# Patient Record
Sex: FEMALE | Race: WHITE | NOT HISPANIC OR LATINO | URBAN - METROPOLITAN AREA
[De-identification: names, ages, dates, MRNs, and addresses within clinical notes are randomized per-mention and may not be internally consistent; named-entity substitution may affect disease eponyms.]

---

## 2022-11-14 ENCOUNTER — INPATIENT (INPATIENT)
Facility: HOSPITAL | Age: 62
LOS: 3 days | Discharge: HOME | DRG: 640 | End: 2022-11-18
Attending: STUDENT IN AN ORGANIZED HEALTH CARE EDUCATION/TRAINING PROGRAM | Admitting: INTERNAL MEDICINE
Payer: COMMERCIAL

## 2022-11-14 VITALS
RESPIRATION RATE: 18 BRPM | DIASTOLIC BLOOD PRESSURE: 102 MMHG | HEIGHT: 64 IN | SYSTOLIC BLOOD PRESSURE: 197 MMHG | OXYGEN SATURATION: 98 % | TEMPERATURE: 98 F | WEIGHT: 220.02 LBS | HEART RATE: 102 BPM

## 2022-11-14 LAB
ALBUMIN SERPL ELPH-MCNC: 3.5 G/DL — SIGNIFICANT CHANGE UP (ref 3.3–5)
ALBUMIN SERPL ELPH-MCNC: 3.8 G/DL — SIGNIFICANT CHANGE UP (ref 3.3–5)
ALP SERPL-CCNC: 63 U/L — SIGNIFICANT CHANGE UP (ref 40–120)
ALP SERPL-CCNC: SIGNIFICANT CHANGE UP (ref 40–120)
ALT FLD-CCNC: 9 U/L — LOW (ref 10–45)
ALT FLD-CCNC: SIGNIFICANT CHANGE UP (ref 10–45)
ANION GAP SERPL CALC-SCNC: 20 MMOL/L — HIGH (ref 5–17)
ANION GAP SERPL CALC-SCNC: 22 MMOL/L — HIGH (ref 5–17)
APPEARANCE UR: CLEAR — SIGNIFICANT CHANGE UP
APTT BLD: 31 SEC — SIGNIFICANT CHANGE UP (ref 27.5–35.5)
AST SERPL-CCNC: 14 U/L — SIGNIFICANT CHANGE UP (ref 10–40)
AST SERPL-CCNC: SIGNIFICANT CHANGE UP (ref 10–40)
BACTERIA # UR AUTO: SIGNIFICANT CHANGE UP /HPF
BASE EXCESS BLDV CALC-SCNC: 2 MMOL/L — SIGNIFICANT CHANGE UP (ref -2–3)
BASOPHILS # BLD AUTO: 0.05 K/UL — SIGNIFICANT CHANGE UP (ref 0–0.2)
BASOPHILS NFR BLD AUTO: 0.3 % — SIGNIFICANT CHANGE UP (ref 0–2)
BILIRUB SERPL-MCNC: 0.9 MG/DL — SIGNIFICANT CHANGE UP (ref 0.2–1.2)
BILIRUB SERPL-MCNC: 1 MG/DL — SIGNIFICANT CHANGE UP (ref 0.2–1.2)
BILIRUB UR-MCNC: NEGATIVE — SIGNIFICANT CHANGE UP
BUN SERPL-MCNC: 6 MG/DL — LOW (ref 7–23)
BUN SERPL-MCNC: 6 MG/DL — LOW (ref 7–23)
CALCIUM SERPL-MCNC: 9.1 MG/DL — SIGNIFICANT CHANGE UP (ref 8.4–10.5)
CALCIUM SERPL-MCNC: 9.4 MG/DL — SIGNIFICANT CHANGE UP (ref 8.4–10.5)
CHLORIDE SERPL-SCNC: 73 MMOL/L — LOW (ref 96–108)
CHLORIDE SERPL-SCNC: 75 MMOL/L — LOW (ref 96–108)
CO2 BLDV-SCNC: 26.7 MMOL/L — HIGH (ref 22–26)
CO2 SERPL-SCNC: 19 MMOL/L — LOW (ref 22–31)
CO2 SERPL-SCNC: 21 MMOL/L — LOW (ref 22–31)
COLOR SPEC: YELLOW — SIGNIFICANT CHANGE UP
CREAT ?TM UR-MCNC: 27 MG/DL — SIGNIFICANT CHANGE UP
CREAT SERPL-MCNC: 0.61 MG/DL — SIGNIFICANT CHANGE UP (ref 0.5–1.3)
CREAT SERPL-MCNC: 0.62 MG/DL — SIGNIFICANT CHANGE UP (ref 0.5–1.3)
DIFF PNL FLD: ABNORMAL
EGFR: 100 ML/MIN/1.73M2 — SIGNIFICANT CHANGE UP
EGFR: 100 ML/MIN/1.73M2 — SIGNIFICANT CHANGE UP
EOSINOPHIL # BLD AUTO: 0 K/UL — SIGNIFICANT CHANGE UP (ref 0–0.5)
EOSINOPHIL NFR BLD AUTO: 0 % — SIGNIFICANT CHANGE UP (ref 0–6)
EPI CELLS # UR: SIGNIFICANT CHANGE UP /HPF (ref 0–5)
GAS PNL BLDV: SIGNIFICANT CHANGE UP
GLUCOSE SERPL-MCNC: 196 MG/DL — HIGH (ref 70–99)
GLUCOSE SERPL-MCNC: 198 MG/DL — HIGH (ref 70–99)
GLUCOSE UR QL: NEGATIVE — SIGNIFICANT CHANGE UP
HCO3 BLDV-SCNC: 26 MMOL/L — SIGNIFICANT CHANGE UP (ref 22–29)
HCT VFR BLD CALC: 45.9 % — HIGH (ref 34.5–45)
HGB BLD-MCNC: 16 G/DL — HIGH (ref 11.5–15.5)
IMM GRANULOCYTES NFR BLD AUTO: 0.3 % — SIGNIFICANT CHANGE UP (ref 0–0.9)
INR BLD: 0.98 — SIGNIFICANT CHANGE UP (ref 0.88–1.16)
KETONES UR-MCNC: >=80 MG/DL
LEUKOCYTE ESTERASE UR-ACNC: NEGATIVE — SIGNIFICANT CHANGE UP
LYMPHOCYTES # BLD AUTO: 1.8 K/UL — SIGNIFICANT CHANGE UP (ref 1–3.3)
LYMPHOCYTES # BLD AUTO: 12.2 % — LOW (ref 13–44)
MAGNESIUM SERPL-MCNC: 1.4 MG/DL — LOW (ref 1.6–2.6)
MCHC RBC-ENTMCNC: 28.1 PG — SIGNIFICANT CHANGE UP (ref 27–34)
MCHC RBC-ENTMCNC: 34.9 GM/DL — SIGNIFICANT CHANGE UP (ref 32–36)
MCV RBC AUTO: 80.7 FL — SIGNIFICANT CHANGE UP (ref 80–100)
MONOCYTES # BLD AUTO: 0.72 K/UL — SIGNIFICANT CHANGE UP (ref 0–0.9)
MONOCYTES NFR BLD AUTO: 4.9 % — SIGNIFICANT CHANGE UP (ref 2–14)
NEUTROPHILS # BLD AUTO: 12.11 K/UL — HIGH (ref 1.8–7.4)
NEUTROPHILS NFR BLD AUTO: 82.3 % — HIGH (ref 43–77)
NITRITE UR-MCNC: NEGATIVE — SIGNIFICANT CHANGE UP
NRBC # BLD: 0 /100 WBCS — SIGNIFICANT CHANGE UP (ref 0–0)
OSMOLALITY UR: 135 MOSM/KG — LOW (ref 300–900)
PCO2 BLDV: 36 MMHG — LOW (ref 39–42)
PH BLDV: 7.46 — HIGH (ref 7.32–7.43)
PH UR: 6 — SIGNIFICANT CHANGE UP (ref 5–8)
PLATELET # BLD AUTO: 200 K/UL — SIGNIFICANT CHANGE UP (ref 150–400)
PO2 BLDV: 47 MMHG — HIGH (ref 25–45)
POTASSIUM SERPL-MCNC: 2.7 MMOL/L — CRITICAL LOW (ref 3.5–5.3)
POTASSIUM SERPL-MCNC: SIGNIFICANT CHANGE UP (ref 3.5–5.3)
POTASSIUM SERPL-SCNC: 2.7 MMOL/L — CRITICAL LOW (ref 3.5–5.3)
POTASSIUM SERPL-SCNC: SIGNIFICANT CHANGE UP (ref 3.5–5.3)
PROT SERPL-MCNC: 7.6 G/DL — SIGNIFICANT CHANGE UP (ref 6–8.3)
PROT SERPL-MCNC: 7.8 G/DL — SIGNIFICANT CHANGE UP (ref 6–8.3)
PROT UR-MCNC: 30 MG/DL
PROTHROM AB SERPL-ACNC: 11.7 SEC — SIGNIFICANT CHANGE UP (ref 10.5–13.4)
RBC # BLD: 5.69 M/UL — HIGH (ref 3.8–5.2)
RBC # FLD: 13 % — SIGNIFICANT CHANGE UP (ref 10.3–14.5)
RBC CASTS # UR COMP ASSIST: < 5 /HPF — SIGNIFICANT CHANGE UP
SAO2 % BLDV: 80.8 % — SIGNIFICANT CHANGE UP (ref 67–88)
SARS-COV-2 RNA SPEC QL NAA+PROBE: POSITIVE
SODIUM SERPL-SCNC: 114 MMOL/L — CRITICAL LOW (ref 135–145)
SODIUM SERPL-SCNC: 116 MMOL/L — CRITICAL LOW (ref 135–145)
SODIUM UR-SCNC: 20 MMOL/L — SIGNIFICANT CHANGE UP
SP GR SPEC: 1.01 — SIGNIFICANT CHANGE UP (ref 1–1.03)
TROPONIN T SERPL-MCNC: 0.01 NG/ML — SIGNIFICANT CHANGE UP (ref 0–0.01)
UROBILINOGEN FLD QL: 0.2 E.U./DL — SIGNIFICANT CHANGE UP
WBC # BLD: 14.73 K/UL — HIGH (ref 3.8–10.5)
WBC # FLD AUTO: 14.73 K/UL — HIGH (ref 3.8–10.5)
WBC UR QL: < 5 /HPF — SIGNIFICANT CHANGE UP

## 2022-11-14 PROCEDURE — 71046 X-RAY EXAM CHEST 2 VIEWS: CPT | Mod: 26

## 2022-11-14 PROCEDURE — 99223 1ST HOSP IP/OBS HIGH 75: CPT

## 2022-11-14 PROCEDURE — 99285 EMERGENCY DEPT VISIT HI MDM: CPT | Mod: 25

## 2022-11-14 PROCEDURE — 93010 ELECTROCARDIOGRAM REPORT: CPT

## 2022-11-14 RX ORDER — POTASSIUM CHLORIDE 20 MEQ
40 PACKET (EA) ORAL ONCE
Refills: 0 | Status: COMPLETED | OUTPATIENT
Start: 2022-11-14 | End: 2022-11-14

## 2022-11-14 RX ORDER — POTASSIUM CHLORIDE 20 MEQ
20 PACKET (EA) ORAL
Refills: 0 | Status: COMPLETED | OUTPATIENT
Start: 2022-11-14 | End: 2022-11-15

## 2022-11-14 RX ORDER — MAGNESIUM SULFATE 500 MG/ML
2 VIAL (ML) INJECTION ONCE
Refills: 0 | Status: COMPLETED | OUTPATIENT
Start: 2022-11-14 | End: 2022-11-14

## 2022-11-14 RX ORDER — DESMOPRESSIN ACETATE 0.1 MG/1
0.5 TABLET ORAL ONCE
Refills: 0 | Status: DISCONTINUED | OUTPATIENT
Start: 2022-11-14 | End: 2022-11-14

## 2022-11-14 RX ORDER — POTASSIUM CHLORIDE 20 MEQ
20 PACKET (EA) ORAL ONCE
Refills: 0 | Status: COMPLETED | OUTPATIENT
Start: 2022-11-14 | End: 2022-11-14

## 2022-11-14 RX ORDER — SODIUM CHLORIDE 9 MG/ML
1000 INJECTION INTRAMUSCULAR; INTRAVENOUS; SUBCUTANEOUS ONCE
Refills: 0 | Status: COMPLETED | OUTPATIENT
Start: 2022-11-14 | End: 2022-11-14

## 2022-11-14 RX ORDER — MAGNESIUM SULFATE 500 MG/ML
1 VIAL (ML) INJECTION ONCE
Refills: 0 | Status: COMPLETED | OUTPATIENT
Start: 2022-11-14 | End: 2022-11-14

## 2022-11-14 RX ADMIN — Medication 25 GRAM(S): at 23:37

## 2022-11-14 RX ADMIN — Medication 40 MILLIEQUIVALENT(S): at 23:38

## 2022-11-14 RX ADMIN — Medication 40 MILLIEQUIVALENT(S): at 23:37

## 2022-11-14 RX ADMIN — Medication 100 GRAM(S): at 22:19

## 2022-11-14 RX ADMIN — Medication 50 MILLIEQUIVALENT(S): at 22:19

## 2022-11-14 RX ADMIN — SODIUM CHLORIDE 1000 MILLILITER(S): 9 INJECTION INTRAMUSCULAR; INTRAVENOUS; SUBCUTANEOUS at 19:16
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
## 2022-11-14 NOTE — ED PROVIDER NOTE - PHYSICAL EXAMINATION
CONST: nontoxic NAD speaking in full sentences listening to radio w/ headphones  HEAD: atraumatic  EYES: conjunctivae clear, PERRL, EOMI  ENT: mmm  NECK: supple/FROM  CARD: rrr no murmurs  CHEST: ctab no r/r/w  ABD: soft, nd, nttp, no rebound/guarding  EXT: FROM, symmetric distal pulses intact  SKIN: warm, dry, no rash, no pedal edema/ttp/rash, cap refill <2sec  NEURO: a+ox3, CN II-XII intact, 5/5 strength x4, gross sensation intact x4, baseline gait

## 2022-11-14 NOTE — CONSULT NOTE ADULT - ATTENDING COMMENTS
62 year old female with PMH of HTN, DM, breast CA, hypothyroidism - presented with 2-3 days of generalized weakness; similar presentation about 2 weeks ago - went to OSH (not admitted) and was sent home with BP and DM medications, but she says she has not been taking them; in ER patient found with severe electrolytes abnormalities, including hyponatremia; patient admits to poor oral intake for few weeks, except for water (drinks 10 16oz water bottles per day).    Physical exam:   VS: reviewed  General no distress  Neuro: AAO X 3, no focal neurologic deficits  Lungs: no use of accessory muscles, no wheezing, no crackles  CV: rrr, normal S1/S2  GI: abdomen obese  Extr: no clubbing/cyanosis    Labs/imaging: reviewed  Na 114 -> 116 (received only 100ml iv fluids NS)  K 2.7, Mag 1.4  Glucose 196  Cl 75, bicarb 21, AG 20  BUN 6, creat 0.62  LFTs normal   trop 0.01  TSH 1.53  INR 0.98  WBC 14.73, , Hgb 16.0  VB.46/26/36  Lactate 2.0  COVID +  CXR: no infiltrates    Assessment and Plan:  Hyponatremia - likely primary polydipsia (urine osm 135)  Hypokalemia, hypomagnesemia   AG metabolic acidosis - ? starvation acidosis  COVID +, but no CXR abnormalities  Hx of HTN, DM - unclear about recent prescription medications  Hx of breast CA  Hx of hypothyroidism - unclear if patient taking any synthroid at home   -admit to ICU  -fluid restriction (800ml fluid/24h)  -desmopressin 0.5 X 1  -Na level q2-3h  -replace K, Mag and Phos; follow up repeat chemistry  -repeat serum and urine osm in 12h  -send alcohol level  -send beta-hydroxybutirate  -send procalcitonin   -continue home dose of synthroid (if taking any)  -obtain home meds list if possible (?use of any psych meds)  DVT prophylaxis: lovenox

## 2022-11-14 NOTE — ED PROVIDER NOTE - PROGRESS NOTE DETAILS
pt does NOT recall pharmacy to which Rx were sent. reportedly has them at home and is willing to start taking them "since the care is better here." ju consulted. will see pt. 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 s/p 100cc ivf. remaining discontinued.    micu consulted. will see pt. micu reccs for admission to med/tele under dr portillo. micu called back requesting change to micu under  micu called back requesting change to micu under dr pisano. bed board aware. 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 ivf ordered prior to findings. only total 100cc ivf received. remaining discontinued.    micu consulted. will see pt.
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
## 2022-11-14 NOTE — ED PROVIDER NOTE - CLINICAL SUMMARY MEDICAL DECISION MAKING FREE TEXT BOX
avss. nontoxic. NAD. no systemic sx. a+ox3. no acute focal neuro deficits. gcs 15. 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 npo. ivf discontinued. see progress note. cxr w/o acute abnl. no indication for ct head at this time. urine lytes sent. micu consulted. will admit to micu per reccs.

## 2022-11-14 NOTE — ED ADULT NURSE NOTE - OBJECTIVE STATEMENT
Pt arrived to ED AAOX4 , spont unlab breathing on RA, NAD. PT is c/o bilat leg weakness that began yesterday. Pt states she is normally ambulatory with a walker, but yesterday felt it was very difficult to get up to her walker. No recent falls. Pt has not taken any medications in many years because she believes the pharmacist switched her medications with "sedatives".

## 2022-11-14 NOTE — ED ADULT TRIAGE NOTE - CHIEF COMPLAINT QUOTE
Pt has hx of DM, HTN, "it's been a while since I took my meds." Pt reports generalized weakness. Denies chest pain, sob, headache, dizziness, n/v.

## 2022-11-15 LAB
A1C WITH ESTIMATED AVERAGE GLUCOSE RESULT: 11.9 % — HIGH (ref 4–5.6)
ALBUMIN SERPL ELPH-MCNC: 3.3 G/DL — SIGNIFICANT CHANGE UP (ref 3.3–5)
ALBUMIN SERPL ELPH-MCNC: 3.5 G/DL — SIGNIFICANT CHANGE UP (ref 3.3–5)
ALBUMIN SERPL ELPH-MCNC: 3.7 G/DL — SIGNIFICANT CHANGE UP (ref 3.3–5)
ALP SERPL-CCNC: 57 U/L — SIGNIFICANT CHANGE UP (ref 40–120)
ALP SERPL-CCNC: 59 U/L — SIGNIFICANT CHANGE UP (ref 40–120)
ALP SERPL-CCNC: 63 U/L — SIGNIFICANT CHANGE UP (ref 40–120)
ALT FLD-CCNC: 9 U/L — LOW (ref 10–45)
ALT FLD-CCNC: 9 U/L — LOW (ref 10–45)
ALT FLD-CCNC: SIGNIFICANT CHANGE UP (ref 10–45)
AMPHET UR-MCNC: NEGATIVE — SIGNIFICANT CHANGE UP
ANION GAP SERPL CALC-SCNC: 12 MMOL/L — SIGNIFICANT CHANGE UP (ref 5–17)
ANION GAP SERPL CALC-SCNC: 14 MMOL/L — SIGNIFICANT CHANGE UP (ref 5–17)
ANION GAP SERPL CALC-SCNC: 15 MMOL/L — SIGNIFICANT CHANGE UP (ref 5–17)
ANION GAP SERPL CALC-SCNC: 15 MMOL/L — SIGNIFICANT CHANGE UP (ref 5–17)
ANION GAP SERPL CALC-SCNC: 18 MMOL/L — HIGH (ref 5–17)
ANION GAP SERPL CALC-SCNC: 19 MMOL/L — HIGH (ref 5–17)
ANION GAP SERPL CALC-SCNC: 24 MMOL/L — HIGH (ref 5–17)
AST SERPL-CCNC: 12 U/L — SIGNIFICANT CHANGE UP (ref 10–40)
AST SERPL-CCNC: 13 U/L — SIGNIFICANT CHANGE UP (ref 10–40)
AST SERPL-CCNC: SIGNIFICANT CHANGE UP (ref 10–40)
BARBITURATES UR SCN-MCNC: NEGATIVE — SIGNIFICANT CHANGE UP
BASOPHILS # BLD AUTO: 0.05 K/UL — SIGNIFICANT CHANGE UP (ref 0–0.2)
BASOPHILS NFR BLD AUTO: 0.4 % — SIGNIFICANT CHANGE UP (ref 0–2)
BENZODIAZ UR-MCNC: NEGATIVE — SIGNIFICANT CHANGE UP
BILIRUB SERPL-MCNC: 0.9 MG/DL — SIGNIFICANT CHANGE UP (ref 0.2–1.2)
BILIRUB SERPL-MCNC: 1 MG/DL — SIGNIFICANT CHANGE UP (ref 0.2–1.2)
BILIRUB SERPL-MCNC: 1.1 MG/DL — SIGNIFICANT CHANGE UP (ref 0.2–1.2)
BUN SERPL-MCNC: 11 MG/DL — SIGNIFICANT CHANGE UP (ref 7–23)
BUN SERPL-MCNC: 11 MG/DL — SIGNIFICANT CHANGE UP (ref 7–23)
BUN SERPL-MCNC: 6 MG/DL — LOW (ref 7–23)
BUN SERPL-MCNC: 7 MG/DL — SIGNIFICANT CHANGE UP (ref 7–23)
BUN SERPL-MCNC: 7 MG/DL — SIGNIFICANT CHANGE UP (ref 7–23)
CALCIUM SERPL-MCNC: 7.8 MG/DL — LOW (ref 8.4–10.5)
CALCIUM SERPL-MCNC: 8.2 MG/DL — LOW (ref 8.4–10.5)
CALCIUM SERPL-MCNC: 8.4 MG/DL — SIGNIFICANT CHANGE UP (ref 8.4–10.5)
CALCIUM SERPL-MCNC: 8.5 MG/DL — SIGNIFICANT CHANGE UP (ref 8.4–10.5)
CALCIUM SERPL-MCNC: 9 MG/DL — SIGNIFICANT CHANGE UP (ref 8.4–10.5)
CHLORIDE SERPL-SCNC: 74 MMOL/L — LOW (ref 96–108)
CHLORIDE SERPL-SCNC: 77 MMOL/L — LOW (ref 96–108)
CHLORIDE SERPL-SCNC: 79 MMOL/L — LOW (ref 96–108)
CHLORIDE SERPL-SCNC: 81 MMOL/L — LOW (ref 96–108)
CHLORIDE SERPL-SCNC: 82 MMOL/L — LOW (ref 96–108)
CHLORIDE SERPL-SCNC: 83 MMOL/L — LOW (ref 96–108)
CHLORIDE SERPL-SCNC: 83 MMOL/L — LOW (ref 96–108)
CO2 SERPL-SCNC: 16 MMOL/L — LOW (ref 22–31)
CO2 SERPL-SCNC: 20 MMOL/L — LOW (ref 22–31)
CO2 SERPL-SCNC: 21 MMOL/L — LOW (ref 22–31)
CO2 SERPL-SCNC: 21 MMOL/L — LOW (ref 22–31)
CO2 SERPL-SCNC: 22 MMOL/L — SIGNIFICANT CHANGE UP (ref 22–31)
CO2 SERPL-SCNC: 25 MMOL/L — SIGNIFICANT CHANGE UP (ref 22–31)
CO2 SERPL-SCNC: 25 MMOL/L — SIGNIFICANT CHANGE UP (ref 22–31)
COCAINE METAB.OTHER UR-MCNC: NEGATIVE — SIGNIFICANT CHANGE UP
CREAT SERPL-MCNC: 0.48 MG/DL — LOW (ref 0.5–1.3)
CREAT SERPL-MCNC: 0.56 MG/DL — SIGNIFICANT CHANGE UP (ref 0.5–1.3)
CREAT SERPL-MCNC: 0.64 MG/DL — SIGNIFICANT CHANGE UP (ref 0.5–1.3)
CREAT SERPL-MCNC: 0.67 MG/DL — SIGNIFICANT CHANGE UP (ref 0.5–1.3)
CREAT SERPL-MCNC: 0.69 MG/DL — SIGNIFICANT CHANGE UP (ref 0.5–1.3)
CREAT SERPL-MCNC: 0.71 MG/DL — SIGNIFICANT CHANGE UP (ref 0.5–1.3)
CREAT SERPL-MCNC: 0.74 MG/DL — SIGNIFICANT CHANGE UP (ref 0.5–1.3)
EGFR: 102 ML/MIN/1.73M2 — SIGNIFICANT CHANGE UP
EGFR: 106 ML/MIN/1.73M2 — SIGNIFICANT CHANGE UP
EGFR: 91 ML/MIN/1.73M2 — SIGNIFICANT CHANGE UP
EGFR: 95 ML/MIN/1.73M2 — SIGNIFICANT CHANGE UP
EGFR: 97 ML/MIN/1.73M2 — SIGNIFICANT CHANGE UP
EGFR: 98 ML/MIN/1.73M2 — SIGNIFICANT CHANGE UP
EGFR: 99 ML/MIN/1.73M2 — SIGNIFICANT CHANGE UP
EOSINOPHIL # BLD AUTO: 0.01 K/UL — SIGNIFICANT CHANGE UP (ref 0–0.5)
EOSINOPHIL NFR BLD AUTO: 0.1 % — SIGNIFICANT CHANGE UP (ref 0–6)
ESTIMATED AVERAGE GLUCOSE: 295 MG/DL — HIGH (ref 68–114)
ETHANOL SERPL-MCNC: <10 MG/DL — SIGNIFICANT CHANGE UP (ref 0–10)
GAS PNL BLDA: SIGNIFICANT CHANGE UP
GLUCOSE BLDC GLUCOMTR-MCNC: 158 MG/DL — HIGH (ref 70–99)
GLUCOSE BLDC GLUCOMTR-MCNC: 183 MG/DL — HIGH (ref 70–99)
GLUCOSE BLDC GLUCOMTR-MCNC: 211 MG/DL — HIGH (ref 70–99)
GLUCOSE BLDC GLUCOMTR-MCNC: 352 MG/DL — HIGH (ref 70–99)
GLUCOSE SERPL-MCNC: 158 MG/DL — HIGH (ref 70–99)
GLUCOSE SERPL-MCNC: 178 MG/DL — HIGH (ref 70–99)
GLUCOSE SERPL-MCNC: 185 MG/DL — HIGH (ref 70–99)
GLUCOSE SERPL-MCNC: 196 MG/DL — HIGH (ref 70–99)
GLUCOSE SERPL-MCNC: 197 MG/DL — HIGH (ref 70–99)
GLUCOSE SERPL-MCNC: 310 MG/DL — HIGH (ref 70–99)
GLUCOSE SERPL-MCNC: 427 MG/DL — HIGH (ref 70–99)
HCT VFR BLD CALC: 37.2 % — SIGNIFICANT CHANGE UP (ref 34.5–45)
HGB BLD-MCNC: 13.2 G/DL — SIGNIFICANT CHANGE UP (ref 11.5–15.5)
IMM GRANULOCYTES NFR BLD AUTO: 0.5 % — SIGNIFICANT CHANGE UP (ref 0–0.9)
LYMPHOCYTES # BLD AUTO: 1.38 K/UL — SIGNIFICANT CHANGE UP (ref 1–3.3)
LYMPHOCYTES # BLD AUTO: 9.7 % — LOW (ref 13–44)
MAGNESIUM SERPL-MCNC: 1.7 MG/DL — SIGNIFICANT CHANGE UP (ref 1.6–2.6)
MAGNESIUM SERPL-MCNC: 2.1 MG/DL — SIGNIFICANT CHANGE UP (ref 1.6–2.6)
MAGNESIUM SERPL-MCNC: 2.5 MG/DL — SIGNIFICANT CHANGE UP (ref 1.6–2.6)
MAGNESIUM SERPL-MCNC: 2.6 MG/DL — SIGNIFICANT CHANGE UP (ref 1.6–2.6)
MCHC RBC-ENTMCNC: 28 PG — SIGNIFICANT CHANGE UP (ref 27–34)
MCHC RBC-ENTMCNC: 35.5 GM/DL — SIGNIFICANT CHANGE UP (ref 32–36)
MCV RBC AUTO: 78.8 FL — LOW (ref 80–100)
METHADONE UR-MCNC: NEGATIVE — SIGNIFICANT CHANGE UP
MONOCYTES # BLD AUTO: 0.79 K/UL — SIGNIFICANT CHANGE UP (ref 0–0.9)
MONOCYTES NFR BLD AUTO: 5.5 % — SIGNIFICANT CHANGE UP (ref 2–14)
NEUTROPHILS # BLD AUTO: 11.97 K/UL — HIGH (ref 1.8–7.4)
NEUTROPHILS NFR BLD AUTO: 83.8 % — HIGH (ref 43–77)
NRBC # BLD: 0 /100 WBCS — SIGNIFICANT CHANGE UP (ref 0–0)
OPIATES UR-MCNC: NEGATIVE — SIGNIFICANT CHANGE UP
OSMOLALITY SERPL: 251 MOSM/KG — LOW (ref 280–301)
OSMOLALITY SERPL: 254 MOSM/KG — LOW (ref 280–301)
OSMOLALITY UR: 123 MOSM/KG — LOW (ref 300–900)
OSMOLALITY UR: 386 MOSM/KG — SIGNIFICANT CHANGE UP (ref 300–900)
PCP SPEC-MCNC: SIGNIFICANT CHANGE UP
PCP UR-MCNC: NEGATIVE — SIGNIFICANT CHANGE UP
PHOSPHATE SERPL-MCNC: 1.5 MG/DL — LOW (ref 2.5–4.5)
PHOSPHATE SERPL-MCNC: 1.8 MG/DL — LOW (ref 2.5–4.5)
PHOSPHATE SERPL-MCNC: 2.7 MG/DL — SIGNIFICANT CHANGE UP (ref 2.5–4.5)
PLATELET # BLD AUTO: 356 K/UL — SIGNIFICANT CHANGE UP (ref 150–400)
POTASSIUM SERPL-MCNC: 3.5 MMOL/L — SIGNIFICANT CHANGE UP (ref 3.5–5.3)
POTASSIUM SERPL-MCNC: 3.6 MMOL/L — SIGNIFICANT CHANGE UP (ref 3.5–5.3)
POTASSIUM SERPL-MCNC: 3.8 MMOL/L — SIGNIFICANT CHANGE UP (ref 3.5–5.3)
POTASSIUM SERPL-MCNC: 3.8 MMOL/L — SIGNIFICANT CHANGE UP (ref 3.5–5.3)
POTASSIUM SERPL-MCNC: 3.9 MMOL/L — SIGNIFICANT CHANGE UP (ref 3.5–5.3)
POTASSIUM SERPL-MCNC: SIGNIFICANT CHANGE UP (ref 3.5–5.3)
POTASSIUM SERPL-MCNC: SIGNIFICANT CHANGE UP (ref 3.5–5.3)
POTASSIUM SERPL-SCNC: 3.5 MMOL/L — SIGNIFICANT CHANGE UP (ref 3.5–5.3)
POTASSIUM SERPL-SCNC: 3.6 MMOL/L — SIGNIFICANT CHANGE UP (ref 3.5–5.3)
POTASSIUM SERPL-SCNC: 3.8 MMOL/L — SIGNIFICANT CHANGE UP (ref 3.5–5.3)
POTASSIUM SERPL-SCNC: 3.8 MMOL/L — SIGNIFICANT CHANGE UP (ref 3.5–5.3)
POTASSIUM SERPL-SCNC: 3.9 MMOL/L — SIGNIFICANT CHANGE UP (ref 3.5–5.3)
POTASSIUM SERPL-SCNC: SIGNIFICANT CHANGE UP (ref 3.5–5.3)
POTASSIUM SERPL-SCNC: SIGNIFICANT CHANGE UP (ref 3.5–5.3)
PROT SERPL-MCNC: 7.1 G/DL — SIGNIFICANT CHANGE UP (ref 6–8.3)
PROT SERPL-MCNC: 7.1 G/DL — SIGNIFICANT CHANGE UP (ref 6–8.3)
PROT SERPL-MCNC: 7.6 G/DL — SIGNIFICANT CHANGE UP (ref 6–8.3)
RBC # BLD: 4.72 M/UL — SIGNIFICANT CHANGE UP (ref 3.8–5.2)
RBC # FLD: 12.8 % — SIGNIFICANT CHANGE UP (ref 10.3–14.5)
SODIUM SERPL-SCNC: 110 MMOL/L — CRITICAL LOW (ref 135–145)
SODIUM SERPL-SCNC: 116 MMOL/L — CRITICAL LOW (ref 135–145)
SODIUM SERPL-SCNC: 117 MMOL/L — CRITICAL LOW (ref 135–145)
SODIUM SERPL-SCNC: 119 MMOL/L — CRITICAL LOW (ref 135–145)
SODIUM SERPL-SCNC: 120 MMOL/L — CRITICAL LOW (ref 135–145)
SODIUM SERPL-SCNC: 122 MMOL/L — LOW (ref 135–145)
SODIUM SERPL-SCNC: 122 MMOL/L — LOW (ref 135–145)
SODIUM UR-SCNC: 54 MMOL/L — SIGNIFICANT CHANGE UP
THC UR QL: NEGATIVE — SIGNIFICANT CHANGE UP
UUN UR-MCNC: 122 MG/DL — SIGNIFICANT CHANGE UP
WBC # BLD: 14.27 K/UL — HIGH (ref 3.8–10.5)
WBC # FLD AUTO: 14.27 K/UL — HIGH (ref 3.8–10.5)

## 2022-11-15 PROCEDURE — 36000 PLACE NEEDLE IN VEIN: CPT

## 2022-11-15 PROCEDURE — 99233 SBSQ HOSP IP/OBS HIGH 50: CPT | Mod: GC

## 2022-11-15 PROCEDURE — 76937 US GUIDE VASCULAR ACCESS: CPT | Mod: 26

## 2022-11-15 RX ORDER — SODIUM,POTASSIUM PHOSPHATES 278-250MG
1 POWDER IN PACKET (EA) ORAL ONCE
Refills: 0 | Status: COMPLETED | OUTPATIENT
Start: 2022-11-15 | End: 2022-11-15

## 2022-11-15 RX ORDER — INSULIN LISPRO 100/ML
VIAL (ML) SUBCUTANEOUS
Refills: 0 | Status: DISCONTINUED | OUTPATIENT
Start: 2022-11-15 | End: 2022-11-18

## 2022-11-15 RX ORDER — GLUCAGON INJECTION, SOLUTION 0.5 MG/.1ML
1 INJECTION, SOLUTION SUBCUTANEOUS ONCE
Refills: 0 | Status: DISCONTINUED | OUTPATIENT
Start: 2022-11-15 | End: 2022-11-18

## 2022-11-15 RX ORDER — INFLUENZA VIRUS VACCINE 15; 15; 15; 15 UG/.5ML; UG/.5ML; UG/.5ML; UG/.5ML
0.5 SUSPENSION INTRAMUSCULAR ONCE
Refills: 0 | Status: DISCONTINUED | OUTPATIENT
Start: 2022-11-15 | End: 2022-11-18

## 2022-11-15 RX ORDER — SODIUM CHLORIDE 9 MG/ML
750 INJECTION, SOLUTION INTRAVENOUS ONCE
Refills: 0 | Status: COMPLETED | OUTPATIENT
Start: 2022-11-15 | End: 2022-11-15

## 2022-11-15 RX ORDER — DESMOPRESSIN ACETATE 0.1 MG/1
0.5 TABLET ORAL ONCE
Refills: 0 | Status: DISCONTINUED | OUTPATIENT
Start: 2022-11-15 | End: 2022-11-15

## 2022-11-15 RX ORDER — DESMOPRESSIN ACETATE 0.1 MG/1
0.5 TABLET ORAL ONCE
Refills: 0 | Status: COMPLETED | OUTPATIENT
Start: 2022-11-15 | End: 2022-11-15

## 2022-11-15 RX ORDER — THIAMINE MONONITRATE (VIT B1) 100 MG
500 TABLET ORAL EVERY 24 HOURS
Refills: 0 | Status: DISCONTINUED | OUTPATIENT
Start: 2022-11-15 | End: 2022-11-15

## 2022-11-15 RX ORDER — SODIUM CHLORIDE 9 MG/ML
750 INJECTION, SOLUTION INTRAVENOUS
Refills: 0 | Status: DISCONTINUED | OUTPATIENT
Start: 2022-11-15 | End: 2022-11-15

## 2022-11-15 RX ORDER — LISINOPRIL 2.5 MG/1
10 TABLET ORAL DAILY
Refills: 0 | Status: DISCONTINUED | OUTPATIENT
Start: 2022-11-15 | End: 2022-11-15

## 2022-11-15 RX ORDER — LISINOPRIL 2.5 MG/1
10 TABLET ORAL DAILY
Refills: 0 | Status: DISCONTINUED | OUTPATIENT
Start: 2022-11-15 | End: 2022-11-16

## 2022-11-15 RX ORDER — POTASSIUM CHLORIDE 20 MEQ
20 PACKET (EA) ORAL
Refills: 0 | Status: COMPLETED | OUTPATIENT
Start: 2022-11-15 | End: 2022-11-15

## 2022-11-15 RX ORDER — SODIUM CHLORIDE 5 G/100ML
100 INJECTION, SOLUTION INTRAVENOUS
Refills: 0 | Status: DISCONTINUED | OUTPATIENT
Start: 2022-11-15 | End: 2022-11-15

## 2022-11-15 RX ORDER — DEXTROSE 50 % IN WATER 50 %
15 SYRINGE (ML) INTRAVENOUS ONCE
Refills: 0 | Status: DISCONTINUED | OUTPATIENT
Start: 2022-11-15 | End: 2022-11-18

## 2022-11-15 RX ORDER — THIAMINE MONONITRATE (VIT B1) 100 MG
500 TABLET ORAL EVERY 24 HOURS
Refills: 0 | Status: DISCONTINUED | OUTPATIENT
Start: 2022-11-16 | End: 2022-11-17

## 2022-11-15 RX ORDER — SODIUM CHLORIDE 9 MG/ML
250 INJECTION, SOLUTION INTRAVENOUS
Refills: 0 | Status: DISCONTINUED | OUTPATIENT
Start: 2022-11-15 | End: 2022-11-15

## 2022-11-15 RX ORDER — POTASSIUM CHLORIDE 20 MEQ
20 PACKET (EA) ORAL ONCE
Refills: 0 | Status: COMPLETED | OUTPATIENT
Start: 2022-11-15 | End: 2022-11-15

## 2022-11-15 RX ORDER — ENOXAPARIN SODIUM 100 MG/ML
40 INJECTION SUBCUTANEOUS EVERY 12 HOURS
Refills: 0 | Status: DISCONTINUED | OUTPATIENT
Start: 2022-11-15 | End: 2022-11-18

## 2022-11-15 RX ORDER — CHLORHEXIDINE GLUCONATE 213 G/1000ML
1 SOLUTION TOPICAL
Refills: 0 | Status: DISCONTINUED | OUTPATIENT
Start: 2022-11-15 | End: 2022-11-18

## 2022-11-15 RX ORDER — POTASSIUM CHLORIDE 20 MEQ
20 PACKET (EA) ORAL
Refills: 0 | Status: DISCONTINUED | OUTPATIENT
Start: 2022-11-15 | End: 2022-11-15

## 2022-11-15 RX ORDER — THIAMINE MONONITRATE (VIT B1) 100 MG
100 TABLET ORAL ONCE
Refills: 0 | Status: COMPLETED | OUTPATIENT
Start: 2022-11-15 | End: 2022-11-15

## 2022-11-15 RX ORDER — SODIUM CHLORIDE 9 MG/ML
1000 INJECTION, SOLUTION INTRAVENOUS
Refills: 0 | Status: DISCONTINUED | OUTPATIENT
Start: 2022-11-15 | End: 2022-11-18

## 2022-11-15 RX ORDER — DEXTROSE 50 % IN WATER 50 %
25 SYRINGE (ML) INTRAVENOUS ONCE
Refills: 0 | Status: DISCONTINUED | OUTPATIENT
Start: 2022-11-15 | End: 2022-11-18

## 2022-11-15 RX ORDER — HYDRALAZINE HCL 50 MG
10 TABLET ORAL ONCE
Refills: 0 | Status: COMPLETED | OUTPATIENT
Start: 2022-11-15 | End: 2022-11-15

## 2022-11-15 RX ORDER — DESMOPRESSIN ACETATE 0.1 MG/1
2 TABLET ORAL ONCE
Refills: 0 | Status: COMPLETED | OUTPATIENT
Start: 2022-11-15 | End: 2022-11-15

## 2022-11-15 RX ORDER — ENOXAPARIN SODIUM 100 MG/ML
40 INJECTION SUBCUTANEOUS EVERY 24 HOURS
Refills: 0 | Status: DISCONTINUED | OUTPATIENT
Start: 2022-11-15 | End: 2022-11-15

## 2022-11-15 RX ORDER — MAGNESIUM SULFATE 500 MG/ML
2 VIAL (ML) INJECTION ONCE
Refills: 0 | Status: COMPLETED | OUTPATIENT
Start: 2022-11-15 | End: 2022-11-15

## 2022-11-15 RX ORDER — FOLIC ACID 0.8 MG
1 TABLET ORAL DAILY
Refills: 0 | Status: DISCONTINUED | OUTPATIENT
Start: 2022-11-15 | End: 2022-11-17

## 2022-11-15 RX ORDER — SODIUM CHLORIDE 9 MG/ML
250 INJECTION, SOLUTION INTRAVENOUS
Refills: 0 | Status: COMPLETED | OUTPATIENT
Start: 2022-11-15 | End: 2022-11-15

## 2022-11-15 RX ORDER — POTASSIUM CHLORIDE 20 MEQ
40 PACKET (EA) ORAL ONCE
Refills: 0 | Status: COMPLETED | OUTPATIENT
Start: 2022-11-15 | End: 2022-11-15

## 2022-11-15 RX ORDER — DEXTROSE 50 % IN WATER 50 %
12.5 SYRINGE (ML) INTRAVENOUS ONCE
Refills: 0 | Status: DISCONTINUED | OUTPATIENT
Start: 2022-11-15 | End: 2022-11-18

## 2022-11-15 RX ORDER — SODIUM CHLORIDE 9 MG/ML
500 INJECTION, SOLUTION INTRAVENOUS
Refills: 0 | Status: DISCONTINUED | OUTPATIENT
Start: 2022-11-15 | End: 2022-11-15

## 2022-11-15 RX ADMIN — Medication 1 PACKET(S): at 20:30

## 2022-11-15 RX ADMIN — Medication 40 MILLIEQUIVALENT(S): at 18:45

## 2022-11-15 RX ADMIN — Medication 4: at 21:36

## 2022-11-15 RX ADMIN — Medication 1 PACKET(S): at 18:13

## 2022-11-15 RX ADMIN — Medication 50 MILLIEQUIVALENT(S): at 00:49

## 2022-11-15 RX ADMIN — Medication 2: at 17:12

## 2022-11-15 RX ADMIN — Medication 100 MILLIGRAM(S): at 07:53

## 2022-11-15 RX ADMIN — Medication 1 PACKET(S): at 11:18

## 2022-11-15 RX ADMIN — Medication 20 MILLIEQUIVALENT(S): at 04:15

## 2022-11-15 RX ADMIN — Medication 10: at 11:43

## 2022-11-15 RX ADMIN — LISINOPRIL 10 MILLIGRAM(S): 2.5 TABLET ORAL at 10:10

## 2022-11-15 RX ADMIN — SODIUM CHLORIDE 50 MILLILITER(S): 5 INJECTION, SOLUTION INTRAVENOUS at 02:03

## 2022-11-15 RX ADMIN — SODIUM CHLORIDE 750 MILLILITER(S): 9 INJECTION, SOLUTION INTRAVENOUS at 09:20

## 2022-11-15 RX ADMIN — Medication 10 MILLIGRAM(S): at 01:22

## 2022-11-15 RX ADMIN — Medication 2: at 07:05

## 2022-11-15 RX ADMIN — Medication 50 MILLIEQUIVALENT(S): at 04:15

## 2022-11-15 RX ADMIN — Medication 20 MILLIEQUIVALENT(S): at 06:20

## 2022-11-15 RX ADMIN — Medication 1 MILLIGRAM(S): at 11:18

## 2022-11-15 RX ADMIN — Medication 50 MILLIEQUIVALENT(S): at 02:58

## 2022-11-15 RX ADMIN — SODIUM CHLORIDE 999 MILLILITER(S): 9 INJECTION, SOLUTION INTRAVENOUS at 08:25

## 2022-11-15 RX ADMIN — Medication 20 MILLIEQUIVALENT(S): at 08:25

## 2022-11-15 RX ADMIN — DESMOPRESSIN ACETATE 0.5 MILLIGRAM(S): 0.1 TABLET ORAL at 01:56

## 2022-11-15 RX ADMIN — ENOXAPARIN SODIUM 40 MILLIGRAM(S): 100 INJECTION SUBCUTANEOUS at 18:12

## 2022-11-15 RX ADMIN — Medication 25 GRAM(S): at 06:19

## 2022-11-15 RX ADMIN — DESMOPRESSIN ACETATE 2 MICROGRAM(S): 0.1 TABLET ORAL at 09:20

## 2022-11-15 NOTE — H&P ADULT - NSHPSOCIALHISTORY_GEN_ALL_CORE
ETOH:   Smoking: 10- 15 cigarettes for 30-40 years.  Recreational Drug use: None. ETOH: Denies.   Smoking: 10- 15 cigarettes for 30-40 years.  Recreational Drug use: None.

## 2022-11-15 NOTE — PATIENT PROFILE ADULT - FALL HARM RISK - HARM RISK INTERVENTIONS

## 2022-11-15 NOTE — H&P ADULT - ASSESSMENT
Neuro:   Awake, alert, and oriented to person, place, and time.     Pulmonary:   Saturating appropriately on room air.     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 Recieved 1 L of NS with 150 mmol Na in it.   - Hypertonic saline administered for NA of 110, assymptomatic, repeat NA of 117 but 153mmol saline administered in the interim, pending AM sodium to determine if hypotonic fluids need to be administered.     # Elevated anion gap metabolic acidosis with respiratory alkalosis, with D/D of 1.5 indicating pure AG metabolic acidosis; thought to be starvation ketosis in the setting of decreased PO intake.   - Monitor phosphate for refeeding syndrome.   - D5 administration today to correct overtreatment of hypernatremia.     Heme/ onc:       ID:     # Covid 19 infection: Leukocytosis with left shift noted. Mild intermittent cough.   - Continue to treat symptomatically.     F: 1L NS  E: K>4, mg >2.   N: Fluid restriction, pending which feeds to restart, consistent carb diet.   DVT ppx:   Code Status: Full code.   Disposition: MICU   Neuro:   Awake, alert, and oriented to person, place, and time.     Pulmonary:   Saturating appropriately on room air.     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 Recieved 1 L of NS with 150 mmol Na in it.   - Hypertonic saline administered for NA of 110, assymptomatic, repeat NA of 117 but 153mmol saline administered in the interim, pending AM sodium to determine if hypotonic fluids need to be administered.     # Elevated anion gap metabolic acidosis with respiratory alkalosis, with D/D of 1.5 indicating pure AG metabolic acidosis; thought to be starvation ketosis in the setting of decreased PO intake.   - Monitor phosphate for refeeding syndrome.   - D5 administration today to correct overtreatment of hypernatremia.     Heme/ onc:   # Concern for Small Cell Lung Cancer in the setting of oncologic history and hyponatremia:   - Discuss need to do further workup.     Endocrine:   Starvation Ketoacidosis: Patient with ketones in urine and decreased PO intake for two weeks. Elevated anion gap level.   - Plan to start thiamine and folate repletion.  - Possible D5 administration.   - Monitor for refeeding syndrome.     ID:     # Covid 19 infection: Leukocytosis with left shift noted. Mild intermittent cough.   - Continue to treat symptomatically.     F: 1L NS  E: K>4, mg >2.   N: Fluid restriction, pending which feeds to restart, consistent carb diet.   DVT ppx:   Code Status: Full code.   Disposition: MICU   Neuro:   Awake, alert, and oriented to person, place, and time.     Pulmonary:   Saturating appropriately on room air.     Cardiovascular:   # PVCs: likely in the setting of metabolic derangement attempting to replete with appropriate       Renal:     Hypo-osmolar Euvolemic hyponatremia: likely in the setting of psychogenic polydypsia and reduced PO intake as per history, possibly in relation to unspecified psychiatric medication but patient denies medication takeout. Recieved 1 L of NS with 150 mmol Na in it.   - Hypertonic saline administered for NA of 110, asymptomatic repeat NA of 117 but 153mmol saline administered in the interim, pending AM sodium to determine if hypotonic fluids need to be administered.   - Desmopressin 1 mg PO.     # Elevated anion gap metabolic acidosis with respiratory alkalosis, with D/D of 1.5 indicating pure AG metabolic acidosis; thought to be starvation ketosis in the setting of decreased PO intake.   - Monitor phosphate for refeeding syndrome.   - D5 administration today to correct overtreatment of hypernatremia.     Heme/ onc:   # Concern for Small Cell Lung Cancer in the setting of oncologic history and hyponatremia:   - Discuss need to do further workup.     Endocrine:   Starvation Ketoacidosis: Patient with ketones in urine and decreased PO intake for two weeks. Elevated anion gap level.   - Plan to start thiamine and folate repletion.  - Possible D5 administration.   - Monitor for refeeding syndrome.     ID:     # Covid 19 infection: Leukocytosis with left shift noted. Mild intermittent cough.   - Continue to treat symptomatically.     F: 1L NS  E: K>4, mg >2.   N: Fluid restriction, pending which feeds to restart, consistent carb diet.   DVT ppx:   Code Status: Full code.   Disposition: MICU

## 2022-11-15 NOTE — H&P ADULT - NSICDXPASTMEDICALHX_GEN_ALL_CORE_FT
PAST MEDICAL HISTORY:  Essential hypertension HTN (hypertension)    Hypothyroidism Adult hypothyroidism    Malignant neoplasm of female breast Breast CA, left    Type 2 diabetes mellitus DM type 2 (diabetes mellitus, type 2)
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
## 2022-11-15 NOTE — PROGRESS NOTE ADULT - ASSESSMENT
Pt is a 63 yo F w/ PMH HTN, DM, hypothyroidism, and breast cancer who presents for 2 weeks of lower extremity weakness, found to have Na 114, admitted to MICU for treatment of severe hyponatremia.    NEURO:  #Paranoid delusions:     PULMONARY:   #Concern for small cell lung cancer:  Pt reports 15-20 pack year smoking history and presents with hyponatremia with concern for SIADH. CXR clear, however pt meets criteria for lung cancer screening  - Consider low-dose CT as outpatient     CARDIOVASCULAR:  #PVCs   PVCs noted on telemetry. Pt asymtomatic, denies CP or SOB. Likely i/s/o metabolic derangement.  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 Na 114 on admission. s/p DDAVP 0.5 mg PO x1 dose, DDAVP 2 mcg IV x1 dose, 1L NS, 3% saline 50 cc/hr x2 hrs, 1L D5NS.   - c/w fluid restriction 800 cc/day   - Target Na 117-118 today   - Hypertonic saline if Na not correcting appropriately   - Monitor BMP q6h  - f/u urine lytes     #High anion gap metabolic acidosis  w/ respiratory compensation. Likely 2/2 starvation ketosis i/s/o decreased PO intake x2 weeks.   - Monitor phosphate for refeeding syndrome  - c/w thiamine & folate repletion   - Monitor BMP q6h   - Encourage PO intake     ENDO:   #T2DM  - mISS     ID:   #COVID-19:   On admission, leukocytosis with left shift noted. Pt has mild intermittent cough but otherwise asymptomatic  - Monitor respiratory status  - Continue to treat symptomatically       F: Fluid restrict to 800cc/day   E: K>4, mg >2.   N: Consistent carbohydrate diet   DVT ppx: Lovenox 40 mg BID   GI ppx: None  Code Status: Full code.   Disposition: MICU

## 2022-11-15 NOTE — H&P ADULT - NSICDXPASTSURGICALHX_GEN_ALL_CORE_FT
PAST SURGICAL HISTORY:  Anal fistula Anal fistula    Other postprocedural status History of lumpectomy of left breast

## 2022-11-15 NOTE — PROCEDURE NOTE - PROCEDURE DATE TIME, MLM
Subjective:     Patient is a 79 y.o.  male presents with anemia. He had colonoscopy done on 8 5 which showed a lesion in the cecum consistent with a neoplasm. We were asked to evaluate for possible surgical resection. He reports no abdominal pain. Currently he denies nausea vomiting diarrhea constipation hematochezia hematemesis or melena. He has been on full liquids. [place] fevers or chills. 8/8/2022 - Doing well this AM. No bleeding reported. Tolerating liquid diet. No prep received. 8/9/22: No complaints, no bleeding. Liquid diet and bowel prep today. 8/10/22 BOWEL RESECTION HEMICOLECTOMY LAPAROSCOPIC ROBOTIC, Right Bulmaro    8/11/22 POD1: Comfortable overnight. Pain controlled. AF/VSS. Abd: surgical dsg CDI, abd mildly distended, BS+ with tenderness as expected. -Flatus, -BM   overnight.   MKV46.1      Patient Active Problem List    Diagnosis Date Noted    Cecum mass 08/07/2022    Colon cancer ([facility] [address] 08/07/2022    Acute blood loss anemia 08/04/2022    GIB (gastrointestinal bleeding) 08/04/2022    Urine retention 04/03/2022    Acute cholecystitis 03/28/2022    Pancreatitis, gallstone 03/28/2022    Anemia 03/28/2022    Bacteremia 03/28/2022    Major depressive disorder, recurrent, moderate ([address] 14/67/3666    Diastolic CHF, chronic (HCC) 03/02/2022    Major depressive disorder, recurrent, mild ([address] 03/02/2022    Major depressive disorder, recurrent, unspecified ([address] 03/02/2022    Pacemaker 04/14/2021    Atrial fibrillation ([address] 11/29/2017    GERD (gastroesophageal reflux disease) 10/27/2017    Hypotension 10/26/2017    Sepsis ([address] 10/26/2017    Syncope 10/24/2017    Paroxysmal atrial fibrillation ([address] 06/30/2017    SSS (sick sinus syndrome) ([address] 06/30/2017    Bilateral carotid artery disease ([address] 06/30/2017    Myasthenia gravis ([address]     Mitral valve regurgitation 06/07/2016    Hypokalemia 06/07/2016    Dyslipidemia 06/07/2016    HTN (hypertension) 05/08/2015    Dyspnea 05/08/2015 15-Nov-2022 15:40 S/P coronary artery stent placement 05/08/2015    Coronary atherosclerosis of native coronary vessel 05/08/2015     Past Medical History:   Diagnosis Date    Abnormal EKG 4/22/15    Arrhythmia     Aspiration pneumonia ([address] 10/26/2017    CAD (coronary artery disease) 5/8/2015    Carotid artery stenosis without cerebral infarction 6/7/2016    US 6/15: <50% bilat ICAs    Coronary atherosclerosis of native coronary vessel 5/8/2015    GERMAIN on brilinta 5/7/15: prox LAD PCI, normal EF     Diastolic CHF, chronic ([address] 3/2/2022    Dyslipidemia 6/7/2016    Dyspnea 5/8/2015    Echo 6/15: EF 60%, mod MR, mod LVH, mild AI     ED (erectile dysfunction)     GERD (gastroesophageal reflux disease)     GERD (gastroesophageal reflux disease)     no medication    HTN (hypertension) 5/8/2015    Hypertension     Hypokalemia     Hypokalemia 6/7/2016    Mitral valve regurgitation 6/7/2016    Morbid obesity ([address]     Myasthenia gravis ([address]     Myasthenia gravis ([address] 6/17/15    Nocturia     Osteoporosis     PUD (peptic ulcer disease) 25 yrs ago    S/P coronary artery stent placement 5/8/2015    3.0x38 mm Xience CAROL to pLAD 5/7/15     Sleep apnea     Syncope and collapse     Unspecified sleep apnea     no cpap      Past Surgical History:   Procedure Laterality Date    APPENDECTOMY      CARDIAC CATHETERIZATION  05/21/2019    watchman device    CARDIAC CATHETERIZATION  05/27/2015    stent    COLONOSCOPY  2007    COLONOSCOPY N/A 8/6/2022    COLONOSCOPY POLYPECTOMY SNARE/COLD BIOPSY performed by Ozell Dance, MD at [facility] ENDOSCOPY    ERCP  3/30/2022         HEMORRHOID SURGERY      PACEMAKER  2018    Nadja Kraus/Giana for sleep apnea and reconstruction for extending jaw    UPPER GASTROINTESTINAL ENDOSCOPY N/A 8/5/2022    EGD ESOPHAGOGASTRODUODENOSCOPY Rm 525 performed by Candice Darby MD at [address] Right 07/10/2019     Repair of right radial artery pseudoaneurysm      Medications Prior to Admission: potassium chloride (KLOR-CON M) 20 MEQ extended release tablet, Take 1 tablet by mouth daily  aspirin 81 MG EC tablet, Take 81 mg by mouth daily  atorvastatin (LIPITOR) 80 MG tablet, Take 80 mg by mouth  cyanocobalamin 1000 MCG tablet, Take 1,000 mcg by mouth daily (Patient not taking: No sig reported)  escitalopram (LEXAPRO) 10 MG tablet, Take 10 mg by mouth daily (Patient not taking: No sig reported)  furosemide (LASIX) 40 MG tablet, Take 40 mg by mouth daily  nitroGLYCERIN (NITROSTAT) 0.4 MG SL tablet, Place 1 sl under the tongue q 5 min prn cp, max 3 sl in a 15-min time period. Call 911 if no relief after the 3rd sl.  pantoprazole (PROTONIX) 40 MG tablet, Take 40 mg by mouth every morning (before breakfast) (Patient not taking: No sig reported)  rOPINIRole (REQUIP) 0.5 MG tablet, 1 nightly, increase to 1 twice a day if needed  tamsulosin (FLOMAX) 0.4 MG capsule, Take 0.4 mg by mouth daily  No Known Allergies   Social History     Tobacco Use    Smoking status: Never    Smokeless tobacco: Never   Substance Use Topics    Alcohol use: No      Family History   Problem Relation Age of Onset    No Known Problems Brother     Cancer Brother         brain tumor    No Known Problems Sister     Cancer Mother         kidney    Cancer Father         stomach      Review of Systems  Pertinent items are noted in HPI. Objective:     Patient Vitals for the past 8 hrs:   BP Temp Temp src Pulse Resp SpO2   08/11/22 0743 130/75 -- -- 75 20 96 %   08/11/22 0320 (!) 147/75 97.9 °F (36.6 °C) Oral 70 16 100 %     I/O last 3 completed shifts: In: 0862 [P.O.:120; I.V.:4077; IV Piggyback:100]  Out: 1525 [Urine:1425; Blood:100]  No intake/output data recorded.         General: well appearing, no acute distress, alert and oriented  Eyes: PERRLA, sclerae white  ENT: External inspection of ears and nose normal, oropharynx normal  Respiratory: normal chest wall expansion, lungs CTA bilaterally  Cardiovascular: RRR, no m, femoral pulses 2+ bilaterally; extremities without edema    Abdomen: mild distension, surgical dsg CDI. TTP as expected. BS+    Heme/Lymph: without cervical or inguinal adenopathy  Musculoskeletal: gait normal, digits without clubbing or cyanosis  Integumentary: warm, dry, and pink, with no rash, purpura, or petechia  Neurological: Cranial Nerves II-XII grossly intact, normal sensation and muscle strength bilaterally      Data ReviewCBC:   Lab Results   Component Value Date/Time    WBC 10.7 08/10/2022 11:19 AM    RBC 4.14 08/10/2022 11:19 AM     BMP:   Lab Results   Component Value Date/Time    GLUCOSE 96 08/10/2022 11:19 AM    CO2 29 08/10/2022 11:19 AM    BUN 4 08/10/2022 11:19 AM    CREATININE 0.80 08/10/2022 11:19 AM    CALCIUM 9.4 08/10/2022 11:19 AM     Radiology review:   CT CHEST, ABDOMEN AND PELVIS WITH INTRAVENOUS CONTRAST DATED 8/6/2022. History: Cecal mass concerning for colon cancer. Comparison: CT the cardiac over read 4/5/2019, and CT abdomen and pelvis with   contrast 3/27/2022        Technique:   Multiple contiguous helical CT images reconstructed at 5 mm were   obtained from the base of the neck to the ischial tuberosities following oral   and 100 cc Isovue-370 without acute complication. All CT scans performed at   this facility use one or all of the following: Automated exposure control,   adjustment of the mA and/or kVp according to patient's size, iterative   reconstruction. Findings:   CT Chest:   The base of the neck is unremarkable in appearance. No axillary, mediastinal,   or hilar lymphadenopathy is seen. The thoracic aorta is normal in caliber. The   heart appears moderately enlarged. Evaluation with lung windows demonstrates no suspicious pulmonary lesion. Small   nodules are seen in the bilateral lungs measuring up to 5 mm in size. However,   these are unchanged in size and number when compared to a prior CT Overread   dated 4/5/2019.   The lack of pelvic adenopathy is seen. The urinary bladder is unremarkable. No aggressive appearing osseous lesion is   seen. Impression   1.  3.5 cm x 2.4 cm cecal mass. No clear evidence for metastatic disease is   evident by CT imaging. Pulmonary nodules, and hepatic lesions are seen which   are favored to be benign as described above. Assessment:     Principal Problem:    Acute blood loss anemia  Active Problems:    GIB (gastrointestinal bleeding)    Cecum mass    Colon cancer (HCC)    Paroxysmal atrial fibrillation (HCC)    HTN (hypertension)    Myasthenia gravis (HCC)    SSS (sick sinus syndrome) (HCC)    GERD (gastroesophageal reflux disease)    Diastolic CHF, chronic (HCC)    Dyslipidemia  Resolved Problems:    * No resolved hospital problems.  *    8/10/22 S/P BOWEL RESECTION HEMICOLECTOMY LAPAROSCOPIC ROBOTIC, Right Bulmaro  Plan:     > DC cowan today  >OOB for all meals  >CLD until return of bowel function  >Follow electrolytes and replace prn  >follow up pathology  >pain control      NAOMY Curtis - CNP    8/11/2022 7:47 AM

## 2022-11-15 NOTE — H&P ADULT - NSHPPHYSICALEXAM_GEN_ALL_CORE
VITAL SIGNS:  T(C): 36.8 (11-15-22 @ 04:30), Max: 36.8 (11-15-22 @ 04:30)  T(F): 98.3 (11-15-22 @ 04:30), Max: 98.3 (11-15-22 @ 04:30)  HR: 83 (11-15-22 @ 05:00) (72 - 102)  BP: 124/58 (11-15-22 @ 05:00) (95/51 - 220/119)  BP(mean): 84 (11-15-22 @ 05:00) (68 - 150)  RR: 19 (11-15-22 @ 05:00) (16 - 24)  SpO2: 96% (11-15-22 @ 05:00) (93% - 99%)  Wt(kg): --    PHYSICAL EXAM:  Constitutional: Obese female lying in bed in no  HEENT: Atraumatic/normocephalic. PERRL, anicteric sclera,  non-scrape-able white plaque on tongue, uvula midline, no oropharyngeal exudates; mucous membranes dry.   Neck: supple; no JVD.  Respiratory: Clear to auscultation bilaterally;  Cardiac: Regular rate and rhythm, S1/S2;   Gastrointestinal: abdomen soft, non-tender and non-distended; Normoactive bowel sounds.   Extremities: darkening of the skin surrounding right and left groin underlying pannus. cool extremities, no clubbing or cyanosis; no peripheral edema.   Musculoskeletal: Normal ROM in upper and lower extremities; no joint swelling, tenderness or erythema, 2+ radial, Dorsalis pedis and posterior tibial pulses bilaterally.  Dermatologic: skin warm, dry and intact; no rashes, wounds, or scars  Neurologic: AAOx3; no focal deficits.  Psychiatric: paranoid affect, responds to questions appropriately. VITAL SIGNS:  T(C): 36.8 (11-15-22 @ 04:30), Max: 36.8 (11-15-22 @ 04:30)  T(F): 98.3 (11-15-22 @ 04:30), Max: 98.3 (11-15-22 @ 04:30)  HR: 83 (11-15-22 @ 05:00) (72 - 102)  BP: 124/58 (11-15-22 @ 05:00) (95/51 - 220/119)  BP(mean): 84 (11-15-22 @ 05:00) (68 - 150)  RR: 19 (11-15-22 @ 05:00) (16 - 24)  SpO2: 96% (11-15-22 @ 05:00) (93% - 99%)  Wt(kg): --    PHYSICAL EXAM:  Constitutional: Obese female lying in bed in no  HEENT: Atraumatic/normocephalic. PERRL, anicteric sclera,  non-scrape-able white plaque on tongue, uvula midline, no oropharyngeal exudates; mucous membranes dry.   Neck: supple; no JVD.  Respiratory: Clear to auscultation bilaterally;  Cardiac: Regular rate and rhythm, S1/S2;   Gastrointestinal: abdomen soft, non-tender and non-distended; Normoactive bowel sounds.   Extremities: darkening of the skin surrounding right and left groin underlying pannus. cool extremities, no clubbing or cyanosis; no peripheral edema. 1+ radial, Dorsalis pedis and posterior tibial pulses bilaterally.  Dermatologic: skin warm, dry and intact; no rashes, wounds, or scars  Neurologic: AAOx3; no focal deficits.  Psychiatric: paranoid affect, responds to questions appropriately.

## 2022-11-15 NOTE — PROCEDURE NOTE - NSICDXPROCEDURE_GEN_ALL_CORE_FT
PROCEDURES:  Insertion of intravenous catheter with ultrasound guidance 15-Nov-2022 15:55:51  Eulalia Pretty

## 2022-11-15 NOTE — H&P ADULT - NSHPREVIEWOFSYSTEMS_GEN_ALL_CORE
REVIEW OF SYSTEMS:    CONSTITUTIONAL: (+) weakness. No fever, chills.  EYES/ENT: No visual changes;  No ear pain, runny nose, or sore throat.   NECK: No pain or stiffness.  RESPIRATORY: (+) cough, No wheezing, no hemoptysis; No shortness of breath.  CARDIOVASCULAR: No chest pain, dyspnea on exertion, or palpitations.  GASTROINTESTINAL: (+) diarrhea. No abdominal or epigastric pain. No nausea, vomiting, or hematemesis; No melena or hematochezia.  GENITOURINARY: (+) frequency. No dysuria or hematuria.  NEUROLOGICAL: No numbness or weakness.  SKIN: No itching, rashes.

## 2022-11-15 NOTE — PROGRESS NOTE ADULT - SUBJECTIVE AND OBJECTIVE BOX
Patient is a 62y old  Female who presents with a chief complaint of Hyponatremia (15 Nov 2022 13:59)      INTERVAL HPI/OVERNIGHT EVENTS:   No overnight events   Pt seen and examined at beside this am  Afebrile, hemodynamically stable   Unable to obtain ROS due to sedation    ICU Vital Signs Last 24 Hrs  T(C): 36.5 (15 Nov 2022 14:17), Max: 36.8 (15 Nov 2022 04:30)  T(F): 97.7 (15 Nov 2022 14:17), Max: 98.3 (15 Nov 2022 04:30)  HR: 68 (15 Nov 2022 18:00) (66 - 91)  BP: 150/66 (15 Nov 2022 18:00) (95/51 - 220/119)  BP(mean): 95 (15 Nov 2022 18:00) (68 - 150)  ABP: --  ABP(mean): --  RR: 23 (15 Nov 2022 18:00) (16 - 26)  SpO2: 96% (15 Nov 2022 18:00) (93% - 98%)    O2 Parameters below as of 15 Nov 2022 18:00  Patient On (Oxygen Delivery Method): room air          I&O's Summary    2022 07:01  -  15 Nov 2022 07:00  --------------------------------------------------------  IN: 500 mL / OUT: 200 mL / NET: 300 mL    15 Nov 2022 07:01  -  15 Nov 2022 19:14  --------------------------------------------------------  IN: 1200 mL / OUT: 650 mL / NET: 550 mL          LABS:                        13.2   14.27 )-----------( 356      ( 15 Nov 2022 05:03 )             37.2     11-15    116<LL>  |  79<L>  |  7   ----------------------------<  427<H>  3.8   |  22  |  0.64    Ca    7.8<L>      15 Nov 2022 10:53  Phos  1.8     11-15  Mg     2.6     -15    TPro  7.1  /  Alb  3.7  /  TBili  1.0  /  DBili  x   /  AST  12  /  ALT  9<L>  /  AlkPhos  57  11-15    PT/INR - ( 2022 19:09 )   PT: 11.7 sec;   INR: 0.98          PTT - ( 2022 19:09 )  PTT:31.0 sec  Urinalysis Basic - ( 2022 21:34 )    Color: Yellow / Appearance: Clear / S.010 / pH: x  Gluc: x / Ketone: >=80 mg/dL  / Bili: Negative / Urobili: 0.2 E.U./dL   Blood: x / Protein: 30 mg/dL / Nitrite: NEGATIVE   Leuk Esterase: NEGATIVE / RBC: < 5 /HPF / WBC < 5 /HPF   Sq Epi: x / Non Sq Epi: 0-5 /HPF / Bacteria: None /HPF      CAPILLARY BLOOD GLUCOSE      POCT Blood Glucose.: 158 mg/dL (15 Nov 2022 17:02)  POCT Blood Glucose.: 352 mg/dL (15 Nov 2022 11:32)  POCT Blood Glucose.: 183 mg/dL (15 Nov 2022 06:53)    ABG - ( 15 Nov 2022 02:52 )  pH, Arterial: 7.46  pH, Blood: x     /  pCO2: 29    /  pO2: 85    / HCO3: 21    / Base Excess: -2.1  /  SaO2: 98.2                RADIOLOGY & ADDITIONAL TESTS:    Consultant(s) Notes Reviewed:  [x ] YES  [ ] NO    MEDICATIONS  (STANDING):  chlorhexidine 4% Liquid 1 Application(s) Topical <User Schedule>  dextrose 5%. 1000 milliLiter(s) (100 mL/Hr) IV Continuous <Continuous>  dextrose 5%. 1000 milliLiter(s) (50 mL/Hr) IV Continuous <Continuous>  dextrose 50% Injectable 25 Gram(s) IV Push once  dextrose 50% Injectable 12.5 Gram(s) IV Push once  dextrose 50% Injectable 25 Gram(s) IV Push once  enoxaparin Injectable 40 milliGRAM(s) SubCutaneous every 12 hours  folic acid 1 milliGRAM(s) Oral daily  glucagon  Injectable 1 milliGRAM(s) IntraMuscular once  influenza   Vaccine 0.5 milliLiter(s) IntraMuscular once  insulin lispro (ADMELOG) corrective regimen sliding scale   SubCutaneous Before meals and at bedtime  lisinopril 10 milliGRAM(s) Oral daily    MEDICATIONS  (PRN):  dextrose Oral Gel 15 Gram(s) Oral once PRN Blood Glucose LESS THAN 70 milliGRAM(s)/deciliter      PHYSICAL EXAM:  HEAD:  Atraumatic, Normocephalic  EYES: EOMI, PERRLA, conjunctiva and sclera clear  NECK: Supple, No JVD, Normal thyroid, no enlarged nodes  NERVOUS SYSTEM:  Alert & Awake.   CHEST/LUNG: B/L good air entry; No rales, rhonchi, or wheezing  HEART: S1S2 normal, Regular rate and rhythm; No murmurs  ABDOMEN: Soft, Nontender, Nondistended; Bowel sounds present  EXTREMITIES:  2+ Peripheral Pulses, No clubbing, cyanosis, or edema  LYMPH: No lymphadenopathy noted  SKIN: No rashes or lesions    Care Discussed with Consultants/Other Providers [ x] YES  [ ] NO Patient is a 62y old  Female who presents with a chief complaint of Hyponatremia (15 Nov 2022 13:59)      INTERVAL HPI/OVERNIGHT EVENTS:   Patient seen and examined at bedside. This morning, pt lying in bed, in NAD, with no complaints aside from general weakness. Endorses paranoid delusions, such as not taking her prescribed medications because her pharmacy is adding sedatives to them, and that her dentist implanted a tracking device into her tooth. Denies fever, chills, headache, dizziness, lightheadedness, CP, SOB, palpitations, abdominal pain, n/v, diarrhea, constipation, dysuria, hematuria, urinary urgency/frequency, flank pain, back pain, numbness, or tingling.      ICU Vital Signs Last 24 Hrs  T(C): 36.5 (15 Nov 2022 14:17), Max: 36.8 (15 Nov 2022 04:30)  T(F): 97.7 (15 Nov 2022 14:17), Max: 98.3 (15 Nov 2022 04:30)  HR: 68 (15 Nov 2022 18:00) (66 - 91)  BP: 150/66 (15 Nov 2022 18:00) (95/51 - 220/119)  BP(mean): 95 (15 Nov 2022 18:00) (68 - 150)  ABP: --  ABP(mean): --  RR: 23 (15 Nov 2022 18:00) (16 - 26)  SpO2: 96% (15 Nov 2022 18:00) (93% - 98%)    O2 Parameters below as of 15 Nov 2022 18:00  Patient On (Oxygen Delivery Method): room air          I&O's Summary    2022 07:  -  15 Nov 2022 07:00  --------------------------------------------------------  IN: 500 mL / OUT: 200 mL / NET: 300 mL    15 Nov 2022 07:01  -  15 Nov 2022 19:14  --------------------------------------------------------  IN: 1200 mL / OUT: 650 mL / NET: 550 mL          LABS:                        13.2   14.27 )-----------( 356      ( 15 Nov 2022 05:03 )             37.2     -15    116<LL>  |  79<L>  |  7   ----------------------------<  427<H>  3.8   |  22  |  0.64    Ca    7.8<L>      15 Nov 2022 10:53  Phos  1.8     11-15  Mg     2.6     11-15    TPro  7.1  /  Alb  3.7  /  TBili  1.0  /  DBili  x   /  AST  12  /  ALT  9<L>  /  AlkPhos  57  11-15    PT/INR - ( 2022 19:09 )   PT: 11.7 sec;   INR: 0.98          PTT - ( 2022 19:09 )  PTT:31.0 sec  Urinalysis Basic - ( 2022 21:34 )    Color: Yellow / Appearance: Clear / S.010 / pH: x  Gluc: x / Ketone: >=80 mg/dL  / Bili: Negative / Urobili: 0.2 E.U./dL   Blood: x / Protein: 30 mg/dL / Nitrite: NEGATIVE   Leuk Esterase: NEGATIVE / RBC: < 5 /HPF / WBC < 5 /HPF   Sq Epi: x / Non Sq Epi: 0-5 /HPF / Bacteria: None /HPF      CAPILLARY BLOOD GLUCOSE      POCT Blood Glucose.: 158 mg/dL (15 Nov 2022 17:02)  POCT Blood Glucose.: 352 mg/dL (15 Nov 2022 11:32)  POCT Blood Glucose.: 183 mg/dL (15 Nov 2022 06:53)    ABG - ( 15 Nov 2022 02:52 )  pH, Arterial: 7.46  pH, Blood: x     /  pCO2: 29    /  pO2: 85    / HCO3: 21    / Base Excess: -2.1  /  SaO2: 98.2                RADIOLOGY & ADDITIONAL TESTS:    Consultant(s) Notes Reviewed:  [x ] YES  [ ] NO    MEDICATIONS  (STANDING):  chlorhexidine 4% Liquid 1 Application(s) Topical <User Schedule>  dextrose 5%. 1000 milliLiter(s) (100 mL/Hr) IV Continuous <Continuous>  dextrose 5%. 1000 milliLiter(s) (50 mL/Hr) IV Continuous <Continuous>  dextrose 50% Injectable 25 Gram(s) IV Push once  dextrose 50% Injectable 12.5 Gram(s) IV Push once  dextrose 50% Injectable 25 Gram(s) IV Push once  enoxaparin Injectable 40 milliGRAM(s) SubCutaneous every 12 hours  folic acid 1 milliGRAM(s) Oral daily  glucagon  Injectable 1 milliGRAM(s) IntraMuscular once  influenza   Vaccine 0.5 milliLiter(s) IntraMuscular once  insulin lispro (ADMELOG) corrective regimen sliding scale   SubCutaneous Before meals and at bedtime  lisinopril 10 milliGRAM(s) Oral daily    MEDICATIONS  (PRN):  dextrose Oral Gel 15 Gram(s) Oral once PRN Blood Glucose LESS THAN 70 milliGRAM(s)/deciliter      PHYSICAL EXAM:  GENERAL: Obese female, lying in bed, in NAD   HEENT: PERRLA, EOMI, sclera anicteric, OP clear, dry mucus membranes   RESP: Clear to auscultation bilaterally  CV: RRR, +S1S2, no murmurs, rubs, or gallops  ABD: Soft NTND, +BS, no rebound or guarding, no masses palpated   EXT: Diminished peripheral pulses bilaterally, extremities cool to touch, no edema, clubbing or cyanosis   NEURO: A&Ox3, no focal neurologic deficits  PSYCH: Endorses paranoid and delusional thoughts, in no acute distress     Care Discussed with Consultants/Other Providers [ x] YES  [ ] NO Patient is a 62y old  Female who presents with a chief complaint of Hyponatremia (15 Nov 2022 13:59)      INTERVAL HPI/OVERNIGHT EVENTS:   Patient seen and examined at bedside. This morning, pt lying in bed, in NAD, with no complaints aside from lower extremity weakness. Endorses paranoid delusions, such as not taking her prescribed medications because her pharmacy is adding sedatives to them, and that her dentist implanted a tracking device into her tooth. Denies fever, chills, headache, dizziness, lightheadedness, CP, SOB, palpitations, abdominal pain, n/v, diarrhea, constipation, dysuria, hematuria, urinary urgency/frequency, flank pain, back pain, numbness, or tingling.      ICU Vital Signs Last 24 Hrs  T(C): 36.5 (15 Nov 2022 14:17), Max: 36.8 (15 Nov 2022 04:30)  T(F): 97.7 (15 Nov 2022 14:17), Max: 98.3 (15 Nov 2022 04:30)  HR: 68 (15 Nov 2022 18:00) (66 - 91)  BP: 150/66 (15 Nov 2022 18:00) (95/51 - 220/119)  BP(mean): 95 (15 Nov 2022 18:00) (68 - 150)  ABP: --  ABP(mean): --  RR: 23 (15 Nov 2022 18:00) (16 - 26)  SpO2: 96% (15 Nov 2022 18:00) (93% - 98%)    O2 Parameters below as of 15 Nov 2022 18:00  Patient On (Oxygen Delivery Method): room air          I&O's Summary    2022 07:  -  15 Nov 2022 07:00  --------------------------------------------------------  IN: 500 mL / OUT: 200 mL / NET: 300 mL    15 Nov 2022 07:01  -  15 Nov 2022 19:14  --------------------------------------------------------  IN: 1200 mL / OUT: 650 mL / NET: 550 mL          LABS:                        13.2   14.27 )-----------( 356      ( 15 Nov 2022 05:03 )             37.2     11-15    116<LL>  |  79<L>  |  7   ----------------------------<  427<H>  3.8   |  22  |  0.64    Ca    7.8<L>      15 Nov 2022 10:53  Phos  1.8     11-15  Mg     2.6     11-15    TPro  7.1  /  Alb  3.7  /  TBili  1.0  /  DBili  x   /  AST  12  /  ALT  9<L>  /  AlkPhos  57  11-15    PT/INR - ( 2022 19:09 )   PT: 11.7 sec;   INR: 0.98          PTT - ( 2022 19:09 )  PTT:31.0 sec  Urinalysis Basic - ( 2022 21:34 )    Color: Yellow / Appearance: Clear / S.010 / pH: x  Gluc: x / Ketone: >=80 mg/dL  / Bili: Negative / Urobili: 0.2 E.U./dL   Blood: x / Protein: 30 mg/dL / Nitrite: NEGATIVE   Leuk Esterase: NEGATIVE / RBC: < 5 /HPF / WBC < 5 /HPF   Sq Epi: x / Non Sq Epi: 0-5 /HPF / Bacteria: None /HPF      CAPILLARY BLOOD GLUCOSE      POCT Blood Glucose.: 158 mg/dL (15 Nov 2022 17:02)  POCT Blood Glucose.: 352 mg/dL (15 Nov 2022 11:32)  POCT Blood Glucose.: 183 mg/dL (15 Nov 2022 06:53)    ABG - ( 15 Nov 2022 02:52 )  pH, Arterial: 7.46  pH, Blood: x     /  pCO2: 29    /  pO2: 85    / HCO3: 21    / Base Excess: -2.1  /  SaO2: 98.2                RADIOLOGY & ADDITIONAL TESTS:    Consultant(s) Notes Reviewed:  [x ] YES  [ ] NO    MEDICATIONS  (STANDING):  chlorhexidine 4% Liquid 1 Application(s) Topical <User Schedule>  dextrose 5%. 1000 milliLiter(s) (100 mL/Hr) IV Continuous <Continuous>  dextrose 5%. 1000 milliLiter(s) (50 mL/Hr) IV Continuous <Continuous>  dextrose 50% Injectable 25 Gram(s) IV Push once  dextrose 50% Injectable 12.5 Gram(s) IV Push once  dextrose 50% Injectable 25 Gram(s) IV Push once  enoxaparin Injectable 40 milliGRAM(s) SubCutaneous every 12 hours  folic acid 1 milliGRAM(s) Oral daily  glucagon  Injectable 1 milliGRAM(s) IntraMuscular once  influenza   Vaccine 0.5 milliLiter(s) IntraMuscular once  insulin lispro (ADMELOG) corrective regimen sliding scale   SubCutaneous Before meals and at bedtime  lisinopril 10 milliGRAM(s) Oral daily    MEDICATIONS  (PRN):  dextrose Oral Gel 15 Gram(s) Oral once PRN Blood Glucose LESS THAN 70 milliGRAM(s)/deciliter      PHYSICAL EXAM:  GENERAL: Obese female, lying in bed, in NAD   HEENT: PERRLA, EOMI, sclera anicteric, OP clear, dry mucus membranes   RESP: Clear to auscultation bilaterally  CV: RRR, +S1S2, no murmurs, rubs, or gallops  ABD: Soft NTND, +BS, no rebound or guarding, no masses palpated   EXT: Diminished peripheral pulses bilaterally, extremities cool to touch, no edema, clubbing or cyanosis   NEURO: A&Ox3, no focal neurologic deficits  PSYCH: Endorses paranoid and delusional thoughts, in no acute distress     Care Discussed with Consultants/Other Providers [ x] YES  [ ] NO

## 2022-11-16 LAB
ALBUMIN SERPL ELPH-MCNC: 3.3 G/DL — SIGNIFICANT CHANGE UP (ref 3.3–5)
ALP SERPL-CCNC: 55 U/L — SIGNIFICANT CHANGE UP (ref 40–120)
ALT FLD-CCNC: 9 U/L — LOW (ref 10–45)
ANION GAP SERPL CALC-SCNC: 11 MMOL/L — SIGNIFICANT CHANGE UP (ref 5–17)
ANION GAP SERPL CALC-SCNC: 8 MMOL/L — SIGNIFICANT CHANGE UP (ref 5–17)
ANION GAP SERPL CALC-SCNC: 8 MMOL/L — SIGNIFICANT CHANGE UP (ref 5–17)
ANION GAP SERPL CALC-SCNC: 9 MMOL/L — SIGNIFICANT CHANGE UP (ref 5–17)
AST SERPL-CCNC: 11 U/L — SIGNIFICANT CHANGE UP (ref 10–40)
BASOPHILS # BLD AUTO: 0.1 K/UL — SIGNIFICANT CHANGE UP (ref 0–0.2)
BASOPHILS NFR BLD AUTO: 0.9 % — SIGNIFICANT CHANGE UP (ref 0–2)
BILIRUB SERPL-MCNC: 0.9 MG/DL — SIGNIFICANT CHANGE UP (ref 0.2–1.2)
BUN SERPL-MCNC: 11 MG/DL — SIGNIFICANT CHANGE UP (ref 7–23)
BUN SERPL-MCNC: 13 MG/DL — SIGNIFICANT CHANGE UP (ref 7–23)
BUN SERPL-MCNC: 16 MG/DL — SIGNIFICANT CHANGE UP (ref 7–23)
BUN SERPL-MCNC: 8 MG/DL — SIGNIFICANT CHANGE UP (ref 7–23)
BUN SERPL-MCNC: 9 MG/DL — SIGNIFICANT CHANGE UP (ref 7–23)
BUN SERPL-MCNC: 9 MG/DL — SIGNIFICANT CHANGE UP (ref 7–23)
CALCIUM SERPL-MCNC: 8.1 MG/DL — LOW (ref 8.4–10.5)
CALCIUM SERPL-MCNC: 8.2 MG/DL — LOW (ref 8.4–10.5)
CALCIUM SERPL-MCNC: 8.4 MG/DL — SIGNIFICANT CHANGE UP (ref 8.4–10.5)
CALCIUM SERPL-MCNC: 8.6 MG/DL — SIGNIFICANT CHANGE UP (ref 8.4–10.5)
CHLORIDE SERPL-SCNC: 85 MMOL/L — LOW (ref 96–108)
CHLORIDE SERPL-SCNC: 89 MMOL/L — LOW (ref 96–108)
CHLORIDE SERPL-SCNC: 91 MMOL/L — LOW (ref 96–108)
CHLORIDE SERPL-SCNC: 91 MMOL/L — LOW (ref 96–108)
CO2 SERPL-SCNC: 25 MMOL/L — SIGNIFICANT CHANGE UP (ref 22–31)
CO2 SERPL-SCNC: 26 MMOL/L — SIGNIFICANT CHANGE UP (ref 22–31)
CO2 SERPL-SCNC: 26 MMOL/L — SIGNIFICANT CHANGE UP (ref 22–31)
CO2 SERPL-SCNC: 27 MMOL/L — SIGNIFICANT CHANGE UP (ref 22–31)
CREAT SERPL-MCNC: 0.6 MG/DL — SIGNIFICANT CHANGE UP (ref 0.5–1.3)
CREAT SERPL-MCNC: 0.68 MG/DL — SIGNIFICANT CHANGE UP (ref 0.5–1.3)
CREAT SERPL-MCNC: 0.8 MG/DL — SIGNIFICANT CHANGE UP (ref 0.5–1.3)
CREAT SERPL-MCNC: 0.9 MG/DL — SIGNIFICANT CHANGE UP (ref 0.5–1.3)
CREAT SERPL-MCNC: 0.95 MG/DL — SIGNIFICANT CHANGE UP (ref 0.5–1.3)
CREAT SERPL-MCNC: 1.02 MG/DL — SIGNIFICANT CHANGE UP (ref 0.5–1.3)
EGFR: 101 ML/MIN/1.73M2 — SIGNIFICANT CHANGE UP
EGFR: 62 ML/MIN/1.73M2 — SIGNIFICANT CHANGE UP
EGFR: 67 ML/MIN/1.73M2 — SIGNIFICANT CHANGE UP
EGFR: 72 ML/MIN/1.73M2 — SIGNIFICANT CHANGE UP
EGFR: 83 ML/MIN/1.73M2 — SIGNIFICANT CHANGE UP
EGFR: 98 ML/MIN/1.73M2 — SIGNIFICANT CHANGE UP
EOSINOPHIL # BLD AUTO: 0.12 K/UL — SIGNIFICANT CHANGE UP (ref 0–0.5)
EOSINOPHIL NFR BLD AUTO: 1.1 % — SIGNIFICANT CHANGE UP (ref 0–6)
GLUCOSE BLDC GLUCOMTR-MCNC: 157 MG/DL — HIGH (ref 70–99)
GLUCOSE BLDC GLUCOMTR-MCNC: 185 MG/DL — HIGH (ref 70–99)
GLUCOSE BLDC GLUCOMTR-MCNC: 190 MG/DL — HIGH (ref 70–99)
GLUCOSE BLDC GLUCOMTR-MCNC: 269 MG/DL — HIGH (ref 70–99)
GLUCOSE SERPL-MCNC: 162 MG/DL — HIGH (ref 70–99)
GLUCOSE SERPL-MCNC: 162 MG/DL — HIGH (ref 70–99)
GLUCOSE SERPL-MCNC: 190 MG/DL — HIGH (ref 70–99)
GLUCOSE SERPL-MCNC: 227 MG/DL — HIGH (ref 70–99)
GLUCOSE SERPL-MCNC: 258 MG/DL — HIGH (ref 70–99)
GLUCOSE SERPL-MCNC: 260 MG/DL — HIGH (ref 70–99)
HCT VFR BLD CALC: 38.4 % — SIGNIFICANT CHANGE UP (ref 34.5–45)
HCV AB S/CO SERPL IA: 0.03 S/CO — SIGNIFICANT CHANGE UP
HCV AB SERPL-IMP: SIGNIFICANT CHANGE UP
HGB BLD-MCNC: 13.2 G/DL — SIGNIFICANT CHANGE UP (ref 11.5–15.5)
IMM GRANULOCYTES NFR BLD AUTO: 0.4 % — SIGNIFICANT CHANGE UP (ref 0–0.9)
LYMPHOCYTES # BLD AUTO: 2.56 K/UL — SIGNIFICANT CHANGE UP (ref 1–3.3)
LYMPHOCYTES # BLD AUTO: 23.8 % — SIGNIFICANT CHANGE UP (ref 13–44)
MAGNESIUM SERPL-MCNC: 1.8 MG/DL — SIGNIFICANT CHANGE UP (ref 1.6–2.6)
MAGNESIUM SERPL-MCNC: 2 MG/DL — SIGNIFICANT CHANGE UP (ref 1.6–2.6)
MCHC RBC-ENTMCNC: 27.8 PG — SIGNIFICANT CHANGE UP (ref 27–34)
MCHC RBC-ENTMCNC: 34.4 GM/DL — SIGNIFICANT CHANGE UP (ref 32–36)
MCV RBC AUTO: 80.8 FL — SIGNIFICANT CHANGE UP (ref 80–100)
MONOCYTES # BLD AUTO: 0.83 K/UL — SIGNIFICANT CHANGE UP (ref 0–0.9)
MONOCYTES NFR BLD AUTO: 7.7 % — SIGNIFICANT CHANGE UP (ref 2–14)
NEUTROPHILS # BLD AUTO: 7.1 K/UL — SIGNIFICANT CHANGE UP (ref 1.8–7.4)
NEUTROPHILS NFR BLD AUTO: 66.1 % — SIGNIFICANT CHANGE UP (ref 43–77)
NRBC # BLD: 0 /100 WBCS — SIGNIFICANT CHANGE UP (ref 0–0)
OSMOLALITY SERPL: 253 MOSM/KG — LOW (ref 280–301)
OSMOLALITY UR: 392 MOSM/KG — SIGNIFICANT CHANGE UP (ref 300–900)
OSMOLALITY UR: 450 MOSM/KG — SIGNIFICANT CHANGE UP (ref 300–900)
OSMOLALITY UR: 489 MOSM/KG — SIGNIFICANT CHANGE UP (ref 300–900)
PHOSPHATE SERPL-MCNC: 2.1 MG/DL — LOW (ref 2.5–4.5)
PHOSPHATE SERPL-MCNC: 2.2 MG/DL — LOW (ref 2.5–4.5)
PLATELET # BLD AUTO: 311 K/UL — SIGNIFICANT CHANGE UP (ref 150–400)
POTASSIUM SERPL-MCNC: 3.6 MMOL/L — SIGNIFICANT CHANGE UP (ref 3.5–5.3)
POTASSIUM SERPL-MCNC: 3.8 MMOL/L — SIGNIFICANT CHANGE UP (ref 3.5–5.3)
POTASSIUM SERPL-MCNC: 3.8 MMOL/L — SIGNIFICANT CHANGE UP (ref 3.5–5.3)
POTASSIUM SERPL-MCNC: 3.9 MMOL/L — SIGNIFICANT CHANGE UP (ref 3.5–5.3)
POTASSIUM SERPL-MCNC: 4 MMOL/L — SIGNIFICANT CHANGE UP (ref 3.5–5.3)
POTASSIUM SERPL-MCNC: 4 MMOL/L — SIGNIFICANT CHANGE UP (ref 3.5–5.3)
POTASSIUM SERPL-SCNC: 3.6 MMOL/L — SIGNIFICANT CHANGE UP (ref 3.5–5.3)
POTASSIUM SERPL-SCNC: 3.8 MMOL/L — SIGNIFICANT CHANGE UP (ref 3.5–5.3)
POTASSIUM SERPL-SCNC: 3.8 MMOL/L — SIGNIFICANT CHANGE UP (ref 3.5–5.3)
POTASSIUM SERPL-SCNC: 3.9 MMOL/L — SIGNIFICANT CHANGE UP (ref 3.5–5.3)
POTASSIUM SERPL-SCNC: 4 MMOL/L — SIGNIFICANT CHANGE UP (ref 3.5–5.3)
POTASSIUM SERPL-SCNC: 4 MMOL/L — SIGNIFICANT CHANGE UP (ref 3.5–5.3)
PROT SERPL-MCNC: 6.5 G/DL — SIGNIFICANT CHANGE UP (ref 6–8.3)
RBC # BLD: 4.75 M/UL — SIGNIFICANT CHANGE UP (ref 3.8–5.2)
RBC # FLD: 13.4 % — SIGNIFICANT CHANGE UP (ref 10.3–14.5)
SODIUM SERPL-SCNC: 120 MMOL/L — CRITICAL LOW (ref 135–145)
SODIUM SERPL-SCNC: 121 MMOL/L — LOW (ref 135–145)
SODIUM SERPL-SCNC: 122 MMOL/L — LOW (ref 135–145)
SODIUM SERPL-SCNC: 124 MMOL/L — LOW (ref 135–145)
SODIUM SERPL-SCNC: 125 MMOL/L — LOW (ref 135–145)
SODIUM SERPL-SCNC: 126 MMOL/L — LOW (ref 135–145)
SODIUM UR-SCNC: 21 MMOL/L — SIGNIFICANT CHANGE UP
SODIUM UR-SCNC: 27 MMOL/L — SIGNIFICANT CHANGE UP
SODIUM UR-SCNC: 34 MMOL/L — SIGNIFICANT CHANGE UP
WBC # BLD: 10.75 K/UL — HIGH (ref 3.8–10.5)
WBC # FLD AUTO: 10.75 K/UL — HIGH (ref 3.8–10.5)

## 2022-11-16 PROCEDURE — 99233 SBSQ HOSP IP/OBS HIGH 50: CPT | Mod: GC

## 2022-11-16 PROCEDURE — 99223 1ST HOSP IP/OBS HIGH 75: CPT

## 2022-11-16 RX ORDER — SODIUM CHLORIDE 5 G/100ML
500 INJECTION, SOLUTION INTRAVENOUS
Refills: 0 | Status: DISCONTINUED | OUTPATIENT
Start: 2022-11-16 | End: 2022-11-16

## 2022-11-16 RX ORDER — LISINOPRIL 2.5 MG/1
20 TABLET ORAL DAILY
Refills: 0 | Status: DISCONTINUED | OUTPATIENT
Start: 2022-11-17 | End: 2022-11-17

## 2022-11-16 RX ORDER — SODIUM,POTASSIUM PHOSPHATES 278-250MG
1 POWDER IN PACKET (EA) ORAL ONCE
Refills: 0 | Status: COMPLETED | OUTPATIENT
Start: 2022-11-16 | End: 2022-11-16

## 2022-11-16 RX ORDER — LISINOPRIL 2.5 MG/1
10 TABLET ORAL DAILY
Refills: 0 | Status: DISCONTINUED | OUTPATIENT
Start: 2022-11-16 | End: 2022-11-16

## 2022-11-16 RX ORDER — SODIUM CHLORIDE 5 G/100ML
100 INJECTION, SOLUTION INTRAVENOUS
Refills: 0 | Status: DISCONTINUED | OUTPATIENT
Start: 2022-11-16 | End: 2022-11-16

## 2022-11-16 RX ORDER — LISINOPRIL 2.5 MG/1
10 TABLET ORAL ONCE
Refills: 0 | Status: COMPLETED | OUTPATIENT
Start: 2022-11-16 | End: 2022-11-16

## 2022-11-16 RX ORDER — NIFEDIPINE 30 MG
30 TABLET, EXTENDED RELEASE 24 HR ORAL DAILY
Refills: 0 | Status: DISCONTINUED | OUTPATIENT
Start: 2022-11-16 | End: 2022-11-16

## 2022-11-16 RX ADMIN — Medication 2: at 06:09

## 2022-11-16 RX ADMIN — Medication 1 MILLIGRAM(S): at 11:00

## 2022-11-16 RX ADMIN — Medication 2: at 22:09

## 2022-11-16 RX ADMIN — LISINOPRIL 10 MILLIGRAM(S): 2.5 TABLET ORAL at 06:10

## 2022-11-16 RX ADMIN — Medication 6: at 11:51

## 2022-11-16 RX ADMIN — SODIUM CHLORIDE 100 MILLILITER(S): 5 INJECTION, SOLUTION INTRAVENOUS at 13:47

## 2022-11-16 RX ADMIN — LISINOPRIL 10 MILLIGRAM(S): 2.5 TABLET ORAL at 13:46

## 2022-11-16 RX ADMIN — ENOXAPARIN SODIUM 40 MILLIGRAM(S): 100 INJECTION SUBCUTANEOUS at 17:17

## 2022-11-16 RX ADMIN — ENOXAPARIN SODIUM 40 MILLIGRAM(S): 100 INJECTION SUBCUTANEOUS at 06:10

## 2022-11-16 RX ADMIN — Medication 1 PACKET(S): at 06:10

## 2022-11-16 RX ADMIN — Medication 2: at 15:49

## 2022-11-16 RX ADMIN — Medication 105 MILLIGRAM(S): at 06:31

## 2022-11-16 NOTE — PHYSICAL THERAPY INITIAL EVALUATION ADULT - PERTINENT HX OF CURRENT PROBLEM, REHAB EVAL
Pt. is female presenting with 2 weeks of LE weakness, fond to be severely huponatremic with NA+ =114, admitted to MICU for further management of metabolic acidosis, hyponstremia. Concurrently tested + for COVID.

## 2022-11-16 NOTE — PROVIDER CONTACT NOTE (OTHER) - SITUATION
Frequent urine osmolality etc ordered, ideally to correlate with serum BMPs.  Pt does not have indwelling catheter and is not retaining urine per bladder scan.

## 2022-11-16 NOTE — PHYSICAL THERAPY INITIAL EVALUATION ADULT - PREDICTED DURATION OF THERAPY (DAYS/WKS), PT EVAL
Pt. would benefit from cont. PT f/u to improve endurance and functional mobility; prevent further deconditioning

## 2022-11-16 NOTE — PROGRESS NOTE ADULT - ATTENDING COMMENTS
hyponatremia, hyperglycemia with DM2, starvation ketosis, paranoid ideation  physical as above  renal consult  dose hypertonic saline aiming for around 128 today  continue to follow sugars with ISS  psychiatry consultation  decision making of high complexity

## 2022-11-16 NOTE — BH CONSULTATION LIAISON ASSESSMENT NOTE - RISK ASSESSMENT
Pertinent positive findings on presentation include paranoid ideation, endorsed delusional content, and disorganized thought process suggestive of a diagnosis of psychosis unspecified vs schizophrenia.  Further information about onset, duration, and course of symptoms would be necessary to rule in/out schizophrenia as a diagnosis.  Current negative findings include feelings of hopelessness, worthlessness, suicidal ideation, and homicidal ideation.  Perpetuating factors include poor coping strategies, impulsivity, and non-adherence to treatment due to impaired insight.  Protective factors include self-esteem, self-advocacy, absence of SI/HI, access to stable housing, goal-oriented behavior, and forward-thinking about the future.  No standing psychiatric medications indicated at this time, as the patient refuses all psychiatric interventions.  She does not meet criteria for involuntary inpatient psychiatric admission at this time.  She may benefit from follow-up with outpatient psychiatry, if willing in the future.

## 2022-11-16 NOTE — BH CONSULTATION LIAISON ASSESSMENT NOTE - NSBHCHARTREVIEWVS_PSY_A_CORE FT
Vital Signs Last 24 Hrs  T(C): 36.6 (16 Nov 2022 18:32), Max: 36.6 (16 Nov 2022 14:08)  T(F): 97.9 (16 Nov 2022 18:32), Max: 97.9 (16 Nov 2022 14:08)  HR: 90 (16 Nov 2022 19:00) (57 - 95)  BP: 212/87 (16 Nov 2022 19:00) (130/60 - 222/95)  BP(mean): 136 (16 Nov 2022 19:00) (87 - 137)  RR: 24 (16 Nov 2022 19:00) (13 - 33)  SpO2: 99% (16 Nov 2022 19:00) (92% - 99%)    Parameters below as of 16 Nov 2022 19:00  Patient On (Oxygen Delivery Method): room air

## 2022-11-16 NOTE — CONSULT NOTE ADULT - SUBJECTIVE AND OBJECTIVE BOX
HPI:  62F with pmhx of HTN,. DM , hypothyroidism and breast cancer who presented to the hospital in the setting of lower extremity weakness. Upon arrival note to be hyponatremic to 114, was given some hypertonic saline however had a rapid rise in her creatinine and was given DDAVP. She has corrected well thus far, she was at 122 by 7PM on 11/15, and her goal serum sodium by 7pm on  would be not to exceed 130. Nephrology consulted in the setting of downtrending sodium. Patient endorses she has had hyponatremia in the past, but does not know the overt cause. Endorses drinking about 8-10 bottles of [place] water per day and has not been eating that well.     PAST MEDICAL & SURGICAL HISTORY:  Malignant neoplasm of female breast  Breast CA, left    Hypothyroidism  Adult hypothyroidism      Type 2 diabetes mellitus  DM type 2 (diabetes mellitus, type 2)    Essential hypertension  HTN (hypertension)    Anal fistula  Anal fistula    Other postprocedural status  History of lumpectomy of left breast    Allergies:  No Known Allergies  strawberry (Pruritus)      Home Medications:   chlorhexidine 4% Liquid 1 Application(s) Topical <User Schedule>  dextrose 5%. 1000 milliLiter(s) IV Continuous <Continuous>  dextrose 5%. 1000 milliLiter(s) IV Continuous <Continuous>  dextrose 50% Injectable 25 Gram(s) IV Push once  dextrose 50% Injectable 12.5 Gram(s) IV Push once  dextrose 50% Injectable 25 Gram(s) IV Push once  dextrose Oral Gel 15 Gram(s) Oral once PRN  enoxaparin Injectable 40 milliGRAM(s) SubCutaneous every 12 hours  folic acid 1 milliGRAM(s) Oral daily  glucagon  Injectable 1 milliGRAM(s) IntraMuscular once  influenza   Vaccine 0.5 milliLiter(s) IntraMuscular once  insulin lispro (ADMELOG) corrective regimen sliding scale   SubCutaneous Before meals and at bedtime  sodium chloride 3%. 500 milliLiter(s) IV Continuous <Continuous>  sodium chloride 3%. 500 milliLiter(s) IV Continuous <Continuous>  thiamine IVPB 500 milliGRAM(s) IV Intermittent every 24 hours      Hospital Medications:   MEDICATIONS  (STANDING):  chlorhexidine 4% Liquid 1 Application(s) Topical <User Schedule>  dextrose 5%. 1000 milliLiter(s) (50 mL/Hr) IV Continuous <Continuous>  dextrose 5%. 1000 milliLiter(s) (100 mL/Hr) IV Continuous <Continuous>  dextrose 50% Injectable 25 Gram(s) IV Push once  dextrose 50% Injectable 12.5 Gram(s) IV Push once  dextrose 50% Injectable 25 Gram(s) IV Push once  enoxaparin Injectable 40 milliGRAM(s) SubCutaneous every 12 hours  folic acid 1 milliGRAM(s) Oral daily  glucagon  Injectable 1 milliGRAM(s) IntraMuscular once  influenza   Vaccine 0.5 milliLiter(s) IntraMuscular once  insulin lispro (ADMELOG) corrective regimen sliding scale   SubCutaneous Before meals and at bedtime  sodium chloride 3%. 500 milliLiter(s) (100 mL/Hr) IV Continuous <Continuous>  sodium chloride 3%. 500 milliLiter(s) (50 mL/Hr) IV Continuous <Continuous>  thiamine IVPB 500 milliGRAM(s) IV Intermittent every 24 hours    SOCIAL HISTORY:  Denies ETOh, Smoking,     Family History:  FAMILY HISTORY:      VITALS:  T(F): 96.9 (22 @ 09:19), Max: 97.7 (11-15-22 @ 14:17)  HR: 73 (22 @ 13:00)  BP: 158/67 (22 @ 13:00)  RR: 18 (22 @ 13:00)  SpO2: 97% (22 @ 13:00)  Wt(kg): --    11-15 @ 07:  -   @ 07:00  --------------------------------------------------------  IN: 1750 mL / OUT: 900 mL / NET: 850 mL     @ 07:  -   @ 13:48  --------------------------------------------------------  IN: 100 mL / OUT: 155 mL / NET: -55 mL      CAPILLARY BLOOD GLUCOSE    POCT Blood Glucose.: 269 mg/dL (2022 11:39)  POCT Blood Glucose.: 157 mg/dL (2022 05:52)  POCT Blood Glucose.: 211 mg/dL (15 Nov 2022 21:31)  POCT Blood Glucose.: 158 mg/dL (15 Nov 2022 17:02)    Review of Systems:  ROS negative except as per HPI    PHYSICAL EXAM:  GENERAL: Alert, awake, oriented x3   HEENT: OCTAVIO, EOMI, neck supple, no JVP  CHEST/LUNG: Bilateral clear breath sounds  HEART: Regular rate and rhythm, no murmur, no gallops, no rub   ABDOMEN: Soft, nontender, non distended  EXTREMITIES: no pedal edema  Neurology: AAOx3, no focal neurological deficit  SKIN: No rash or skin lesion     LABS:      120<LL>  |  85<L>  |  9   ----------------------------<  258<H>  3.8   |  26  |  0.60    Ca    8.1<L>      2022 10:36  Phos  2.1       Mg     1.8         TPro  6.5  /  Alb  3.3  /  TBili  0.9  /  DBili      /  AST  11  /  ALT  9<L>  /  AlkPhos  55      Creatinine Trend: 0.60 <--, 0.68 <--, 0.67 <--, 0.74 <--, 0.64 <--, 0.69 <--, 0.71 <--, 0.56 <--, 0.48 <--, 0.62 <--, 0.61 <--                        13.2   10.75 )-----------( 311      ( 2022 04:29 )             38.4     Urine Studies:  Urinalysis Basic - ( 2022 21:34 )    Color: Yellow / Appearance: Clear / S.010 / pH:   Gluc:  / Ketone: >=80 mg/dL  / Bili: Negative / Urobili: 0.2 E.U./dL   Blood:  / Protein: 30 mg/dL / Nitrite: NEGATIVE   Leuk Esterase: NEGATIVE / RBC: < 5 /HPF / WBC < 5 /HPF   Sq Epi:  / Non Sq Epi: 0-5 /HPF / Bacteria: None /HPF      Sodium, Random Urine: 34 mmol/L ( @ 09:29)  Osmolality, Random Urine: 392 mosm/kg ( @ 09:29)  Sodium, Random Urine: 21 mmol/L (11-15 @ 21:52)  Osmolality, Random Urine: 489 mosm/kg (11-15 @ 21:52)  Osmolality, Random Urine: 386 mosm/kg (11-15 @ 10:53)  Sodium, Random Urine: 54 mmol/L (11-15 @ 10:53)  Osmolality, Random Urine: 123 mosm/kg (11-15 @ 01:49)  Sodium, Random Urine: 20 mmol/L ( @ 21:34)  Osmolality, Random Urine: 135 mosm/kg ( @ 21:34)  Creatinine, Random Urine: 27 mg/dL ( @ 21:34)

## 2022-11-16 NOTE — PHYSICAL THERAPY INITIAL EVALUATION ADULT - ADDITIONAL COMMENTS
Pt. is primarily a home ambulator, uses rollator for ambulation, + elevator access, she orders food to be delivered.

## 2022-11-16 NOTE — DIETITIAN INITIAL EVALUATION ADULT - PERTINENT MEDS FT
MEDICATIONS  (STANDING):  chlorhexidine 4% Liquid 1 Application(s) Topical <User Schedule>  dextrose 5%. 1000 milliLiter(s) (50 mL/Hr) IV Continuous <Continuous>  dextrose 5%. 1000 milliLiter(s) (100 mL/Hr) IV Continuous <Continuous>  dextrose 50% Injectable 25 Gram(s) IV Push once  dextrose 50% Injectable 12.5 Gram(s) IV Push once  dextrose 50% Injectable 25 Gram(s) IV Push once  enoxaparin Injectable 40 milliGRAM(s) SubCutaneous every 12 hours  folic acid 1 milliGRAM(s) Oral daily  glucagon  Injectable 1 milliGRAM(s) IntraMuscular once  influenza   Vaccine 0.5 milliLiter(s) IntraMuscular once  insulin lispro (ADMELOG) corrective regimen sliding scale   SubCutaneous Before meals and at bedtime  sodium chloride 3%. 500 milliLiter(s) (100 mL/Hr) IV Continuous <Continuous>  sodium chloride 3%. 500 milliLiter(s) (50 mL/Hr) IV Continuous <Continuous>  thiamine IVPB 500 milliGRAM(s) IV Intermittent every 24 hours    MEDICATIONS  (PRN):  dextrose Oral Gel 15 Gram(s) Oral once PRN Blood Glucose LESS THAN 70 milliGRAM(s)/deciliter

## 2022-11-16 NOTE — DIETITIAN INITIAL EVALUATION ADULT - ADD RECOMMEND
1. Continue consistent carbohydrate diet (no snacks) + 800ml fluid restriction per team  2. Monitor electrolytes closely and replete prn; adjust fluid restriction as Na corrected  3. Encourage PO intake, consider ONS as indicated  4. Continue micronutrient supps  5. Follow up nutrition ed

## 2022-11-16 NOTE — DIETITIAN INITIAL EVALUATION ADULT - OTHER CALCULATIONS
IBW 54.7kg (182% IBW); estimated calorie needs based on IBW, protein needs based on ABW; needs adjusted for medical history of CA, repletion, covid+; fluid per primary team

## 2022-11-16 NOTE — PROGRESS NOTE ADULT - ASSESSMENT
Pt is a 61 yo F w/ PMH HTN, DM, hypothyroidism, and breast cancer who presents for 2 weeks of lower extremity weakness, found to have Na 114, admitted to MICU for treatment of severe hyponatremia.    NEURO:  #Paranoid delusions:     PULMONARY:   #Concern for small cell lung cancer:  Pt reports 15-20 pack year smoking history and presents with hyponatremia with concern for SIADH. CXR clear, however pt meets criteria for lung cancer screening  - Consider low-dose CT as outpatient     CARDIOVASCULAR:  #PVCs   PVCs noted on telemetry. Pt asymtomatic, denies CP or SOB. Likely i/s/o metabolic derangement.  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 Na 114 on admission. s/p DDAVP 0.5 mg PO x1 dose, DDAVP 2 mcg IV x1 dose, 1L NS, 3% saline 50 cc/hr x2 hrs, 1L D5NS.   - c/w fluid restriction 800 cc/day   - Target Na 117-118 today   - Hypertonic saline if Na not correcting appropriately   - Monitor BMP q6h  - f/u urine lytes     #High anion gap metabolic acidosis  w/ respiratory compensation. Likely 2/2 starvation ketosis i/s/o decreased PO intake x2 weeks.   - Monitor phosphate for refeeding syndrome  - c/w thiamine & folate repletion   - Monitor BMP q6h   - Encourage PO intake     ENDO:   #T2DM  - mISS     ID:   #COVID-19:   On admission, leukocytosis with left shift noted. Pt has mild intermittent cough but otherwise asymptomatic  - Monitor respiratory status  - Continue to treat symptomatically       F: Fluid restrict to 800cc/day   E: K>4, mg >2.   N: Consistent carbohydrate diet   DVT ppx: Lovenox 40 mg BID   GI ppx: None  Code Status: Full code.   Disposition: MICU   Pt is a 61 yo F w/ PMH HTN, DM, hypothyroidism, and breast cancer who presents for 2 weeks of lower extremity weakness, found to have Na 114, admitted to MICU for treatment of severe hyponatremia.    NEURO:  #Paranoid delusions  Patient has delusions about medications and previous medical care. Patient was prescribed medications at OSH 2 weeks ago but did not take them since she was concerned that they were toxic.  - Obtain collateral from family  - Psych consult to optimize care    PULMONARY:   #Concern for small cell lung cancer:  Pt reports 15-20 pack year smoking history and presents with hyponatremia with concern for SIADH. CXR clear, however pt meets criteria for lung cancer screening  - Consider low-dose CT as outpatient     CARDIOVASCULAR:  #PVCs   PVCs noted on telemetry. Pt asymtomatic, denies CP or SOB. Likely i/s/o metabolic derangement.  - Continue cardiac monitoring with electrolyte repletion    #HTN:  Home med: Lisinopril 10 mg PO daily, nifedipine and metoprolol. Patient reported that she was not taking antihypertensives at home due to paranoia. sBP 180s this AM  - resumed home meds  - monitor BP     GI:   #Nutrition: Consistent carbohydrate diet     RENAL:  #Hypoosmolar euvolemic hyponatremia:   Etiologies include psychogenic polydipsia vs thiazide diuretic abuse vs SIADH. Na 114 on admission. s/p DDAVP 0.5 mg PO x1 dose, DDAVP 2 mcg IV x1 dose, 1L NS, 3% saline 50 cc/hr x2 hrs, 1L D5NS.   - c/w fluid restriction 800 cc/day  - HTS bolus 100cc, then 50cc/hr   - Target Na 127 today  - consult nephrology  - Monitor BMP q4h    #High anion gap metabolic acidosis  w/ respiratory compensation. Likely 2/2 starvation ketosis i/s/o decreased PO intake x2 weeks.   - Monitor phosphate for refeeding syndrome  - c/w thiamine repletion   - Monitor BMP q4h   - Encourage PO intake     ENDO:   #T2DM  Patient with history of T2DM. Found to have A1C 11.9%. AM glucose 157. Required 10u on 11/16. BG elevated to 269 and 260 pre-meal.  - mISS       ID:   #COVID-19:   On admission, leukocytosis with left shift noted. Pt has mild intermittent cough but otherwise asymptomatic  - Monitor respiratory status  - Continue to treat symptomatically       F: Fluid restrict to 800cc/day   E: K>4, mg >2.   N: Consistent carbohydrate diet   DVT ppx: Lovenox 40 mg BID   GI ppx: None  Code Status: Full code.   Disposition: MICU

## 2022-11-16 NOTE — BH CONSULTATION LIAISON ASSESSMENT NOTE - NSBHCONSULTRECOMMENDOTHER_PSY_A_CORE FT
No standing psychiatric medications indicated at this time, as the patient refuses all psychiatric interventions.  She does not meet criteria for involuntary inpatient psychiatric admission at this time.  She may benefit from follow-up with outpatient psychiatry, if willing in the future.  Please re-consult psychiatry in the future as needed.  Thank you very much for this consult.

## 2022-11-16 NOTE — BH CONSULTATION LIAISON ASSESSMENT NOTE - HPI (INCLUDE ILLNESS QUALITY, SEVERITY, DURATION, TIMING, CONTEXT, MODIFYING FACTORS, ASSOCIATED SIGNS AND SYMPTOMS)
Pt is a 62-year-old woman, domiciled alone in a private apartment, with unknown past psychiatric history, and with a past medical history of hypertension, diabetes, hypothyroidism, and breast cancer, who initially presented with 2 weeks of lower extremity weakness.  She was found to have Na 114, and was admitted to MICU for treatment of severe hyponatremia.  Psychiatry was consulted after the patient endorsed potential delusional content to primary team about not taking some medication because her pharmacy was adding sedatives to them.    Psychiatry saw the patient in her room, and she was in bed, making good eye-contact, calm and superficially cooperative, describing her mood as "pretty upset because I told them I don't want to talk to psychiatry."  The patient denies any history of psychiatric admissions, or consistent follow-up with outpatient psychiatry.  She states that she is not interested in speaking with psychiatry, taking any psychiatric medications, or having [facility] psychiatry continue to follow her.  She however agreed to complete the interview, and described the events that led to her admission and ICU stay: "My sodium was very messed up, they are helping me here with medications and fluids, and I am starting to feel better now."  Pt states she is not sure if an increase in drinking water contributed to her presentation, but she often drinks 8-10 bottles of water per day.  She states that she does not trust psychiatry because her previous psychiatrist, Dr. Brady once visited her at the dentist, and told her "Things will happen that will seem like a coincidence but they are not ... and there is nothing you can do about it."  Since then, the patient reports that "everything has fallen apart, the dentist may have put a chip in my tooth that caused problems, and this all has caused the pharmacy to put sedating things in my medications."  She asks - "Please do research and see what Dr. Brady did."  She denies suicidal thoughts/actions/behaviors, homicidal ideation, or auditory/visual hallucinations.  She also denies symptoms consistent with depression, anibal, or thought insertion/broadcasting.

## 2022-11-16 NOTE — CONSULT NOTE ADULT - ATTENDING COMMENTS
cause of hyponatremia not obvious but mgt as above-- presenting sx essentiailly  resolved cause of hyponatremia not obvious and may have been dietary. persistent hyponatremia now post DDAVP --    mgt as above with 3% for appropriate rise in Na   presenting sx essentially  resolved

## 2022-11-16 NOTE — PROVIDER CONTACT NOTE (OTHER) - ASSESSMENT
RN has encouraged pt to try to void on bedpan when urine samples are due- pt was able to force out 30cc of urine in AM, but has been unsuccessful the other two times she has tried without having the natural urge to void.

## 2022-11-16 NOTE — PHYSICAL THERAPY INITIAL EVALUATION ADULT - ASSISTIVE DEVICE, REHAB EVAL
Telephone Encounter by Cally Fitzgerald MD at 07/12/18 04:14 PM     Author:  Cally Fitzgerald MD Service:  (none) Author Type:  Physician     Filed:  07/12/18 04:14 PM Encounter Date:  7/12/2018 Status:  Signed     :  Cally Fitzgerald MD (Physician)            Please notify pt that stress test was normal.[JC1.1T]      Revision History        User Key Date/Time User Provider Type Action    > JC1.1 07/12/18 04:14 PM Cally Fitzgerald MD Physician Sign    T - Template             bed rails

## 2022-11-16 NOTE — BH CONSULTATION LIAISON ASSESSMENT NOTE - SUMMARY
Pt is a 62-year-old woman, domiciled alone in a private apartment, with unknown past psychiatric history, and with a past medical history of hypertension, diabetes, hypothyroidism, and breast cancer, who initially presented with 2 weeks of lower extremity weakness.  She was found to have Na 114, and was admitted to MICU for treatment of severe hyponatremia.  Psychiatry was consulted after the patient endorsed potential delusional content to primary team about not taking some medication because her pharmacy was adding sedatives to them.

## 2022-11-16 NOTE — PROVIDER CONTACT NOTE (CRITICAL VALUE NOTIFICATION) - ASSESSMENT
Asymptomatic, no acute mental status changes etc.  Not an unexpected lab value given admission reason

## 2022-11-16 NOTE — PHYSICAL THERAPY INITIAL EVALUATION ADULT - MODALITIES TREATMENT COMMENTS
Ambulation was limited to bedside side stepping i/s/o pt. p/w with VYU=[id number]. PT will cont to follow  for further gait assessment.

## 2022-11-16 NOTE — BH CONSULTATION LIAISON ASSESSMENT NOTE - NSBHATTESTCOMMENTATTENDFT_PSY_A_CORE
Pt exhibited psychotic symptoms and chronic schizophrenia seems likely. Pt has PI, delusion that [facility] psychiatrist ruined her life by appearing at a dental exam and mysteriously altering the course of her life by malicious interventions over the years that seemed like coincidences, including the installation of something she thinks may be a microchip in her mouth. Pt malodorous and self care seems suboptimal. Pt does not seem dangerous though and is upset psychiatry consult called. Pt refuses all psych interventions and does not want psychiatry to continue to follow her.

## 2022-11-16 NOTE — DIETITIAN INITIAL EVALUATION ADULT - PERTINENT LABORATORY DATA
11-16    126<L>  |  91<L>  |  13  ----------------------------<  162<H>  3.8   |  27  |  0.90    Ca    8.4      16 Nov 2022 15:59  Phos  2.1     11-16  Mg     1.8     11-16    TPro  6.5  /  Alb  3.3  /  TBili  0.9  /  DBili  x   /  AST  11  /  ALT  9<L>  /  AlkPhos  55  11-16  POCT Blood Glucose.: 185 mg/dL (11-16-22 @ 15:33)  A1C with Estimated Average Glucose Result: 11.9 % (11-15-22 @ 05:03)

## 2022-11-16 NOTE — PROVIDER CONTACT NOTE (OTHER) - RECOMMENDATIONS
Depending on necessity or how vital urine samples are, a fermin would be the most consistent way to collect samples.

## 2022-11-16 NOTE — BH CONSULTATION LIAISON ASSESSMENT NOTE - NSBHCONSULTMEDSEVERE_PSY_A_CORE FT
If the patient exhibits severe agitation/aggression which is not redirectable, may consider Haldol 5 mg oral or IM for acute agitation if QTc is <500

## 2022-11-16 NOTE — PROGRESS NOTE ADULT - SUBJECTIVE AND OBJECTIVE BOX
**Incomplete Note**   CC: Patient is a 62y old  Female who presents with a chief complaint of Hyponatremia (2022 13:47)      INTERVAL EVENTS: NAYELI    SUBJECTIVE / INTERVAL HPI: Patient seen and examined at bedside. Still appears to be confused and paranoid about medications that she received overnight. Endorses dry mouth. Denies nausea, abdominal pain, fever, chills, changes in bowel and urinary patterns.    ROS: negative unless otherwise stated above.    VITAL SIGNS:  Vital Signs Last 24 Hrs  T(C): 36.6 (2022 14:08), Max: 36.6 (2022 14:08)  T(F): 97.9 (2022 14:08), Max: 97.9 (2022 14:08)  HR: 82 (2022 17:00) (57 - 82)  BP: 178/68 (2022 17:00) (130/60 - 194/82)  BP(mean): 99 (2022 17:00) (87 - 119)  RR: 18 (2022 17:00) (16 - 27)  SpO2: 95% (2022 17:00) (92% - 97%)    Parameters below as of 2022 17:00  Patient On (Oxygen Delivery Method): room air          11-15-22 @ 07:  -  22 @ 07:00  --------------------------------------------------------  IN: 1750 mL / OUT: 900 mL / NET: 850 mL    22 @ 07:01  -  22 @ 18:32  --------------------------------------------------------  IN: 400 mL / OUT: 155 mL / NET: 245 mL        PHYSICAL EXAM:  General: NAD  HEENT: MMM  Neck: supple  Cardiovascular: +S1/S2; RRR  Respiratory: CTA B/L; no W/R/R  Gastrointestinal: soft, NT/ND  Extremities: WWP; no edema, clubbing or cyanosis  Vascular: 2+ radial, DP/PT pulses B/L  Neurological: AAOx3; no focal deficits  Psych: AOx3, paranoid delusions present about medications, no auditory or visual hallucinations, able to redirect    MEDICATIONS:  MEDICATIONS  (STANDING):  chlorhexidine 4% Liquid 1 Application(s) Topical <User Schedule>  dextrose 5%. 1000 milliLiter(s) (50 mL/Hr) IV Continuous <Continuous>  dextrose 5%. 1000 milliLiter(s) (100 mL/Hr) IV Continuous <Continuous>  dextrose 50% Injectable 25 Gram(s) IV Push once  dextrose 50% Injectable 12.5 Gram(s) IV Push once  dextrose 50% Injectable 25 Gram(s) IV Push once  enoxaparin Injectable 40 milliGRAM(s) SubCutaneous every 12 hours  folic acid 1 milliGRAM(s) Oral daily  glucagon  Injectable 1 milliGRAM(s) IntraMuscular once  influenza   Vaccine 0.5 milliLiter(s) IntraMuscular once  insulin lispro (ADMELOG) corrective regimen sliding scale   SubCutaneous Before meals and at bedtime  sodium chloride 3%. 500 milliLiter(s) (100 mL/Hr) IV Continuous <Continuous>  sodium chloride 3%. 500 milliLiter(s) (50 mL/Hr) IV Continuous <Continuous>  thiamine IVPB 500 milliGRAM(s) IV Intermittent every 24 hours    MEDICATIONS  (PRN):  dextrose Oral Gel 15 Gram(s) Oral once PRN Blood Glucose LESS THAN 70 milliGRAM(s)/deciliter      ALLERGIES:  Allergies    No Known Allergies    Intolerances    strawberry (Pruritus)      LABS:                        13.2   10.75 )-----------( 311      ( 2022 04:29 )             38.4     -16    126<L>  |  91<L>  |  13  ----------------------------<  162<H>  3.8   |  27  |  0.90    Ca    8.4      2022 15:59  Phos  2.1     -16  Mg     1.8     -    TPro  6.5  /  Alb  3.3  /  TBili  0.9  /  DBili  x   /  AST  11  /  ALT  9<L>  /  AlkPhos  55  11-16    PT/INR - ( 2022 19:09 )   PT: 11.7 sec;   INR: 0.98          PTT - ( 2022 19:09 )  PTT:31.0 sec  Urinalysis Basic - ( 2022 21:34 )    Color: Yellow / Appearance: Clear / S.010 / pH: x  Gluc: x / Ketone: >=80 mg/dL  / Bili: Negative / Urobili: 0.2 E.U./dL   Blood: x / Protein: 30 mg/dL / Nitrite: NEGATIVE   Leuk Esterase: NEGATIVE / RBC: < 5 /HPF / WBC < 5 /HPF   Sq Epi: x / Non Sq Epi: 0-5 /HPF / Bacteria: None /HPF      CAPILLARY BLOOD GLUCOSE      POCT Blood Glucose.: 185 mg/dL (2022 15:33)      RADIOLOGY & ADDITIONAL TESTS: Reviewed.

## 2022-11-16 NOTE — CONSULT NOTE ADULT - ASSESSMENT
62F with pmhx of HTN,. DM , hypothyroidism and breast cancer who presented to the hospital in the setting of lower extremity weakness. Noted to be hyponatremic in setting of possible SIADH vs primary polydipsia.     Assessment/Plan:   #Chronic euvolemic ?asymptomatic hyponatremia  Pt endorsing initial weakness, possibly from the hyponatremia. Endorsing better as sodium has improved. No overt cause identified for SIADH, does not endorse being on any meds, that could cause or pain, nausea or vomiting. There could be a component of primary polydipsia given her urine osm was low when initially presenting. Possibly tea and toast.    -Check BMP at 6PM and 10PM with urine sodium and osm  -Limited of sodium correction should not exceed 128-130 by 7PM on 11/16  -Serum osm verified, no hypothyroidism.   -recommend fluid restriction < 1L/24h   -Please give 100cc hypertonic bolus of 3% saline, followed by 50cc/hr of 3% for 6 hours.     Thank you for the opportunity to participate in the care of your patient. The nephrology service remains available to assist with any questions or concerns. Please feel free to reach us by paging the on-call nephrology fellow for urgent issues or as below.     Frandy Zapata D.O.  PGY 5 - Nephrology Fellow  [phone]

## 2022-11-17 DIAGNOSIS — F17.200 NICOTINE DEPENDENCE, UNSPECIFIED, UNCOMPLICATED: ICD-10-CM

## 2022-11-17 DIAGNOSIS — I10 ESSENTIAL (PRIMARY) HYPERTENSION: ICD-10-CM

## 2022-11-17 DIAGNOSIS — E11.9 TYPE 2 DIABETES MELLITUS WITHOUT COMPLICATIONS: ICD-10-CM

## 2022-11-17 DIAGNOSIS — E87.29 OTHER ACIDOSIS: ICD-10-CM

## 2022-11-17 DIAGNOSIS — Z29.9 ENCOUNTER FOR PROPHYLACTIC MEASURES, UNSPECIFIED: ICD-10-CM

## 2022-11-17 DIAGNOSIS — F22 DELUSIONAL DISORDERS: ICD-10-CM

## 2022-11-17 DIAGNOSIS — Z86.39 PERSONAL HISTORY OF OTHER ENDOCRINE, NUTRITIONAL AND METABOLIC DISEASE: ICD-10-CM

## 2022-11-17 DIAGNOSIS — I49.3 VENTRICULAR PREMATURE DEPOLARIZATION: ICD-10-CM

## 2022-11-17 DIAGNOSIS — U07.1 COVID-19: ICD-10-CM

## 2022-11-17 DIAGNOSIS — E87.1 HYPO-OSMOLALITY AND HYPONATREMIA: ICD-10-CM

## 2022-11-17 LAB
ALBUMIN SERPL ELPH-MCNC: 3.2 G/DL — LOW (ref 3.3–5)
ALP SERPL-CCNC: 56 U/L — SIGNIFICANT CHANGE UP (ref 40–120)
ALT FLD-CCNC: 9 U/L — LOW (ref 10–45)
ANION GAP SERPL CALC-SCNC: 10 MMOL/L — SIGNIFICANT CHANGE UP (ref 5–17)
ANION GAP SERPL CALC-SCNC: 8 MMOL/L — SIGNIFICANT CHANGE UP (ref 5–17)
AST SERPL-CCNC: 11 U/L — SIGNIFICANT CHANGE UP (ref 10–40)
BASOPHILS # BLD AUTO: 0.11 K/UL — SIGNIFICANT CHANGE UP (ref 0–0.2)
BASOPHILS NFR BLD AUTO: 1.2 % — SIGNIFICANT CHANGE UP (ref 0–2)
BILIRUB SERPL-MCNC: 0.5 MG/DL — SIGNIFICANT CHANGE UP (ref 0.2–1.2)
BUN SERPL-MCNC: 13 MG/DL — SIGNIFICANT CHANGE UP (ref 7–23)
BUN SERPL-MCNC: 9 MG/DL — SIGNIFICANT CHANGE UP (ref 7–23)
CALCIUM SERPL-MCNC: 8.4 MG/DL — SIGNIFICANT CHANGE UP (ref 8.4–10.5)
CALCIUM SERPL-MCNC: 8.7 MG/DL — SIGNIFICANT CHANGE UP (ref 8.4–10.5)
CHLORIDE SERPL-SCNC: 92 MMOL/L — LOW (ref 96–108)
CHLORIDE SERPL-SCNC: 93 MMOL/L — LOW (ref 96–108)
CO2 SERPL-SCNC: 27 MMOL/L — SIGNIFICANT CHANGE UP (ref 22–31)
CO2 SERPL-SCNC: 27 MMOL/L — SIGNIFICANT CHANGE UP (ref 22–31)
CREAT SERPL-MCNC: 0.66 MG/DL — SIGNIFICANT CHANGE UP (ref 0.5–1.3)
CREAT SERPL-MCNC: 0.82 MG/DL — SIGNIFICANT CHANGE UP (ref 0.5–1.3)
EGFR: 80 ML/MIN/1.73M2 — SIGNIFICANT CHANGE UP
EGFR: 99 ML/MIN/1.73M2 — SIGNIFICANT CHANGE UP
EOSINOPHIL # BLD AUTO: 0.17 K/UL — SIGNIFICANT CHANGE UP (ref 0–0.5)
EOSINOPHIL NFR BLD AUTO: 1.8 % — SIGNIFICANT CHANGE UP (ref 0–6)
GLUCOSE BLDC GLUCOMTR-MCNC: 188 MG/DL — HIGH (ref 70–99)
GLUCOSE BLDC GLUCOMTR-MCNC: 217 MG/DL — HIGH (ref 70–99)
GLUCOSE BLDC GLUCOMTR-MCNC: 218 MG/DL — HIGH (ref 70–99)
GLUCOSE BLDC GLUCOMTR-MCNC: 220 MG/DL — HIGH (ref 70–99)
GLUCOSE BLDC GLUCOMTR-MCNC: 223 MG/DL — HIGH (ref 70–99)
GLUCOSE SERPL-MCNC: 178 MG/DL — HIGH (ref 70–99)
GLUCOSE SERPL-MCNC: 234 MG/DL — HIGH (ref 70–99)
HCT VFR BLD CALC: 39 % — SIGNIFICANT CHANGE UP (ref 34.5–45)
HGB BLD-MCNC: 13.6 G/DL — SIGNIFICANT CHANGE UP (ref 11.5–15.5)
IMM GRANULOCYTES NFR BLD AUTO: 0.2 % — SIGNIFICANT CHANGE UP (ref 0–0.9)
LYMPHOCYTES # BLD AUTO: 3.23 K/UL — SIGNIFICANT CHANGE UP (ref 1–3.3)
LYMPHOCYTES # BLD AUTO: 33.8 % — SIGNIFICANT CHANGE UP (ref 13–44)
MAGNESIUM SERPL-MCNC: 1.9 MG/DL — SIGNIFICANT CHANGE UP (ref 1.6–2.6)
MCHC RBC-ENTMCNC: 28.1 PG — SIGNIFICANT CHANGE UP (ref 27–34)
MCHC RBC-ENTMCNC: 34.9 GM/DL — SIGNIFICANT CHANGE UP (ref 32–36)
MCV RBC AUTO: 80.6 FL — SIGNIFICANT CHANGE UP (ref 80–100)
MONOCYTES # BLD AUTO: 0.71 K/UL — SIGNIFICANT CHANGE UP (ref 0–0.9)
MONOCYTES NFR BLD AUTO: 7.4 % — SIGNIFICANT CHANGE UP (ref 2–14)
NEUTROPHILS # BLD AUTO: 5.32 K/UL — SIGNIFICANT CHANGE UP (ref 1.8–7.4)
NEUTROPHILS NFR BLD AUTO: 55.6 % — SIGNIFICANT CHANGE UP (ref 43–77)
NRBC # BLD: 0 /100 WBCS — SIGNIFICANT CHANGE UP (ref 0–0)
OSMOLALITY UR: 296 MOSM/KG — LOW (ref 300–900)
OSMOLALITY UR: 371 MOSM/KG — SIGNIFICANT CHANGE UP (ref 300–900)
PHOSPHATE SERPL-MCNC: 2.3 MG/DL — LOW (ref 2.5–4.5)
PLATELET # BLD AUTO: 323 K/UL — SIGNIFICANT CHANGE UP (ref 150–400)
POTASSIUM SERPL-MCNC: 3.9 MMOL/L — SIGNIFICANT CHANGE UP (ref 3.5–5.3)
POTASSIUM SERPL-MCNC: 3.9 MMOL/L — SIGNIFICANT CHANGE UP (ref 3.5–5.3)
POTASSIUM SERPL-SCNC: 3.9 MMOL/L — SIGNIFICANT CHANGE UP (ref 3.5–5.3)
POTASSIUM SERPL-SCNC: 3.9 MMOL/L — SIGNIFICANT CHANGE UP (ref 3.5–5.3)
PROT SERPL-MCNC: 6.6 G/DL — SIGNIFICANT CHANGE UP (ref 6–8.3)
RBC # BLD: 4.84 M/UL — SIGNIFICANT CHANGE UP (ref 3.8–5.2)
RBC # FLD: 13.4 % — SIGNIFICANT CHANGE UP (ref 10.3–14.5)
SODIUM SERPL-SCNC: 128 MMOL/L — LOW (ref 135–145)
SODIUM SERPL-SCNC: 129 MMOL/L — LOW (ref 135–145)
SODIUM UR-SCNC: 32 MMOL/L — SIGNIFICANT CHANGE UP
SODIUM UR-SCNC: 40 MMOL/L — SIGNIFICANT CHANGE UP
WBC # BLD: 9.56 K/UL — SIGNIFICANT CHANGE UP (ref 3.8–10.5)
WBC # FLD AUTO: 9.56 K/UL — SIGNIFICANT CHANGE UP (ref 3.8–10.5)

## 2022-11-17 PROCEDURE — 99232 SBSQ HOSP IP/OBS MODERATE 35: CPT

## 2022-11-17 PROCEDURE — 99233 SBSQ HOSP IP/OBS HIGH 50: CPT | Mod: GC

## 2022-11-17 PROCEDURE — 99222 1ST HOSP IP/OBS MODERATE 55: CPT | Mod: GC

## 2022-11-17 RX ORDER — METOPROLOL TARTRATE 50 MG
50 TABLET ORAL DAILY
Refills: 0 | Status: DISCONTINUED | OUTPATIENT
Start: 2022-11-17 | End: 2022-11-18

## 2022-11-17 RX ORDER — LISINOPRIL 2.5 MG/1
20 TABLET ORAL ONCE
Refills: 0 | Status: COMPLETED | OUTPATIENT
Start: 2022-11-17 | End: 2022-11-17

## 2022-11-17 RX ORDER — LIDOCAINE 4 G/100G
1 CREAM TOPICAL ONCE
Refills: 0 | Status: COMPLETED | OUTPATIENT
Start: 2022-11-17 | End: 2022-11-17

## 2022-11-17 RX ORDER — SODIUM,POTASSIUM PHOSPHATES 278-250MG
1 POWDER IN PACKET (EA) ORAL ONCE
Refills: 0 | Status: COMPLETED | OUTPATIENT
Start: 2022-11-17 | End: 2022-11-17

## 2022-11-17 RX ORDER — INSULIN LISPRO 100/ML
4 VIAL (ML) SUBCUTANEOUS
Refills: 0 | Status: DISCONTINUED | OUTPATIENT
Start: 2022-11-17 | End: 2022-11-18

## 2022-11-17 RX ORDER — INSULIN GLARGINE 100 [IU]/ML
6 INJECTION, SOLUTION SUBCUTANEOUS AT BEDTIME
Refills: 0 | Status: DISCONTINUED | OUTPATIENT
Start: 2022-11-17 | End: 2022-11-17

## 2022-11-17 RX ORDER — INSULIN LISPRO 100/ML
2 VIAL (ML) SUBCUTANEOUS
Refills: 0 | Status: DISCONTINUED | OUTPATIENT
Start: 2022-11-17 | End: 2022-11-17

## 2022-11-17 RX ORDER — POTASSIUM CHLORIDE 20 MEQ
10 PACKET (EA) ORAL ONCE
Refills: 0 | Status: COMPLETED | OUTPATIENT
Start: 2022-11-17 | End: 2022-11-17

## 2022-11-17 RX ORDER — MAGNESIUM SULFATE 500 MG/ML
1 VIAL (ML) INJECTION ONCE
Refills: 0 | Status: COMPLETED | OUTPATIENT
Start: 2022-11-17 | End: 2022-11-17

## 2022-11-17 RX ORDER — INSULIN GLARGINE 100 [IU]/ML
15 INJECTION, SOLUTION SUBCUTANEOUS AT BEDTIME
Refills: 0 | Status: DISCONTINUED | OUTPATIENT
Start: 2022-11-17 | End: 2022-11-18

## 2022-11-17 RX ORDER — HYDRALAZINE HCL 50 MG
50 TABLET ORAL THREE TIMES A DAY
Refills: 0 | Status: DISCONTINUED | OUTPATIENT
Start: 2022-11-17 | End: 2022-11-18

## 2022-11-17 RX ADMIN — Medication 4: at 17:23

## 2022-11-17 RX ADMIN — Medication 100 GRAM(S): at 06:47

## 2022-11-17 RX ADMIN — Medication 10 MILLIEQUIVALENT(S): at 09:57

## 2022-11-17 RX ADMIN — Medication 2: at 07:37

## 2022-11-17 RX ADMIN — Medication 50 MILLIGRAM(S): at 12:58

## 2022-11-17 RX ADMIN — Medication 50 MILLIGRAM(S): at 22:48

## 2022-11-17 RX ADMIN — Medication 105 MILLIGRAM(S): at 06:52

## 2022-11-17 RX ADMIN — LISINOPRIL 20 MILLIGRAM(S): 2.5 TABLET ORAL at 06:52

## 2022-11-17 RX ADMIN — ENOXAPARIN SODIUM 40 MILLIGRAM(S): 100 INJECTION SUBCUTANEOUS at 17:24

## 2022-11-17 RX ADMIN — Medication 4: at 12:21

## 2022-11-17 RX ADMIN — Medication 4: at 22:47

## 2022-11-17 RX ADMIN — ENOXAPARIN SODIUM 40 MILLIGRAM(S): 100 INJECTION SUBCUTANEOUS at 06:51

## 2022-11-17 RX ADMIN — Medication 1 PACKET(S): at 06:52

## 2022-11-17 RX ADMIN — LISINOPRIL 20 MILLIGRAM(S): 2.5 TABLET ORAL at 13:27

## 2022-11-17 RX ADMIN — INSULIN GLARGINE 15 UNIT(S): 100 INJECTION, SOLUTION SUBCUTANEOUS at 22:48

## 2022-11-17 NOTE — PROGRESS NOTE ADULT - PROBLEM SELECTOR PLAN 4
Patient with history of T2DM. Found to have A1C 11.9%. AM glucose 157. Required 10u on 11/16. BG elevated to 269 and 260 pre-meal.  - mISS

## 2022-11-17 NOTE — PROGRESS NOTE ADULT - ASSESSMENT
62F with pmhx of HTN,. DM , hypothyroidism and breast cancer who presented to the hospital in the setting of lower extremity weakness. Noted to be hyponatremic in setting of possible SIADH vs primary polydipsia.     Assessment/Plan:   #Chronic euvolemic ?asymptomatic hyponatremia  Pt endorsing initial weakness, possibly from the hyponatremia. Endorsing better as sodium has improved. No overt cause identified for SIADH, does not endorse being on any meds, that could cause or pain, nausea or vomiting. There could be a component of primary polydipsia given her urine osm was low when initially presenting. Possibly tea and toast.    -Check BMP at 6PM and 10PM with urine sodium and osm  -Limited of sodium correction should not exceed 128-130 by 7PM on 11/16  -Serum osm verified, no hypothyroidism.   -recommend fluid restriction < 1L/24h   -Please give 100cc hypertonic bolus of 3% saline, followed by 50cc/hr of 3% for 6 hours.  62F with pmhx of HTN,. DM , hypothyroidism and breast cancer who presented to the hospital in the setting of lower extremity weakness. Noted to be hyponatremic in setting of possible SIADH vs primary polydipsia.     Assessment/Plan:   #Chronic euvolemic ?asymptomatic hyponatremia  Pt endorsing initial weakness, possibly from the hyponatremia. Endorsing better as sodium has improved. No overt cause identified for SIADH, does not endorse being on any meds, that could cause or pain, nausea or vomiting. There could be a component of primary polydipsia given her urine osm was low when initially presenting. Possibly tea and toast.    -Obtain urine lytes  -Recommend fluid restriction < 1L/24h    62F with pmhx of HTN,. DM , hypothyroidism and breast cancer who presented to the hospital in the setting of lower extremity weakness. Noted to be hyponatremic in setting of possible SIADH vs primary polydipsia.     Assessment/Plan:   #Chronic euvolemic ?asymptomatic hyponatremia  Pt endorsing initial weakness, possibly from the hyponatremia. Endorsing better as sodium has improved. No overt cause identified for SIADH, does not endorse being on any meds, that could cause or pain, nausea or vomiting. There could be a component of primary polydipsia given her urine osm was low when initially presenting. Possibly tea and toast.    -Obtain urine lytes  -Recommend fluid restriction < 1L/24h   -F/u 2PM labs

## 2022-11-17 NOTE — PROGRESS NOTE ADULT - SUBJECTIVE AND OBJECTIVE BOX
REVIEW OF SYSTEMS    MEDICATIONS  (STANDING):  chlorhexidine 4% Liquid 1 Application(s) Topical <User Schedule>  cloNIDine 0.1 milliGRAM(s) Oral every 12 hours  dextrose 5%. 1000 milliLiter(s) (100 mL/Hr) IV Continuous <Continuous>  dextrose 5%. 1000 milliLiter(s) (50 mL/Hr) IV Continuous <Continuous>  dextrose 50% Injectable 25 Gram(s) IV Push once  dextrose 50% Injectable 12.5 Gram(s) IV Push once  dextrose 50% Injectable 25 Gram(s) IV Push once  enoxaparin Injectable 40 milliGRAM(s) SubCutaneous every 12 hours  glucagon  Injectable 1 milliGRAM(s) IntraMuscular once  influenza   Vaccine 0.5 milliLiter(s) IntraMuscular once  insulin lispro (ADMELOG) corrective regimen sliding scale   SubCutaneous Before meals and at bedtime  lisinopril 20 milliGRAM(s) Oral daily    MEDICATIONS  (PRN):  dextrose Oral Gel 15 Gram(s) Oral once PRN Blood Glucose LESS THAN 70 milliGRAM(s)/deciliter      Vital Signs Last 24 Hrs  T(C): 36.1 (17 Nov 2022 09:56), Max: 36.6 (16 Nov 2022 14:08)  T(F): 97 (17 Nov 2022 09:56), Max: 97.9 (16 Nov 2022 14:08)  HR: 69 (17 Nov 2022 09:00) (55 - 95)  BP: 141/59 (17 Nov 2022 09:00) (130/60 - 222/95)  BP(mean): 93 (17 Nov 2022 09:00) (86 - 139)  RR: 17 (17 Nov 2022 09:00) (13 - 33)  SpO2: 95% (17 Nov 2022 09:00) (94% - 99%)    Parameters below as of 17 Nov 2022 09:00  Patient On (Oxygen Delivery Method): room air    LABS  11-17    128<L>  |  93<L>  |  13  ----------------------------<  178<H>  3.9   |  27  |  0.82    Ca    8.4      17 Nov 2022 03:38  Phos  2.3     11-17  Mg     1.9     11-17    TPro  6.6  /  Alb  3.2<L>  /  TBili  0.5  /  DBili  x   /  AST  11  /  ALT  9<L>  /  AlkPhos  56  11-17    I&O's Detail    16 Nov 2022 07:01  -  17 Nov 2022 07:00  --------------------------------------------------------  IN:    Oral Fluid: 600 mL    sodium chloride 3%: 100 mL    sodium chloride 3%: 350 mL  Total IN: 1050 mL    OUT:    Voided (mL): 1250 mL  Total OUT: 1250 mL    Total NET: -200 mL      Historical Values  Osmolality, Random Urine: 371 mosm/kg (11.17.22 @ 03:38)   Osmolality, Random Urine: 450 mosm/kg (11.16.22 @ 20:48)   Osmolality, Random Urine: 392 mosm/kg (11.16.22 @ 09:29)   Osmolality, Random Urine: 489 mosm/kg (11.15.22 @ 21:52)   Osmolality, Random Urine: 386 mosm/kg (11.15.22 @ 10:53)   Osmolality, Random Urine: 123 mosm/kg (11.15.22 @ 01:49)   Osmolality, Random Urine: 135 mosm/kg (11.14.22 @ 21:34)     Historical Values  Osmolality, Serum: 253 mosm/kg (11.16.22 @ 04:29)   Osmolality, Serum: 251 mosm/kg (11.15.22 @ 01:49)   Osmolality, Serum: 254 mosm/kg (11.15.22 @ 00:10)  Patient was seen and examined at bedside this morning. Patient states she is doing well and was advised to limit drinking water. Patient does not endorse additional complaints at this time.    REVIEW OF SYSTEMS  Negative otherwise noted in HPI    MEDICATIONS  (STANDING):  chlorhexidine 4% Liquid 1 Application(s) Topical <User Schedule>  cloNIDine 0.1 milliGRAM(s) Oral every 12 hours  dextrose 5%. 1000 milliLiter(s) (100 mL/Hr) IV Continuous <Continuous>  dextrose 5%. 1000 milliLiter(s) (50 mL/Hr) IV Continuous <Continuous>  dextrose 50% Injectable 25 Gram(s) IV Push once  dextrose 50% Injectable 12.5 Gram(s) IV Push once  dextrose 50% Injectable 25 Gram(s) IV Push once  enoxaparin Injectable 40 milliGRAM(s) SubCutaneous every 12 hours  glucagon  Injectable 1 milliGRAM(s) IntraMuscular once  influenza   Vaccine 0.5 milliLiter(s) IntraMuscular once  insulin lispro (ADMELOG) corrective regimen sliding scale   SubCutaneous Before meals and at bedtime  lisinopril 20 milliGRAM(s) Oral daily    MEDICATIONS  (PRN):  dextrose Oral Gel 15 Gram(s) Oral once PRN Blood Glucose LESS THAN 70 milliGRAM(s)/deciliter      Vital Signs Last 24 Hrs  T(C): 36.1 (17 Nov 2022 09:56), Max: 36.6 (16 Nov 2022 14:08)  T(F): 97 (17 Nov 2022 09:56), Max: 97.9 (16 Nov 2022 14:08)  HR: 69 (17 Nov 2022 09:00) (55 - 95)  BP: 141/59 (17 Nov 2022 09:00) (130/60 - 222/95)  BP(mean): 93 (17 Nov 2022 09:00) (86 - 139)  RR: 17 (17 Nov 2022 09:00) (13 - 33)  SpO2: 95% (17 Nov 2022 09:00) (94% - 99%)    Parameters below as of 17 Nov 2022 09:00  Patient On (Oxygen Delivery Method): room air    LABS  11-17    128<L>  |  93<L>  |  13  ----------------------------<  178<H>  3.9   |  27  |  0.82    Ca    8.4      17 Nov 2022 03:38  Phos  2.3     11-17  Mg     1.9     11-17    TPro  6.6  /  Alb  3.2<L>  /  TBili  0.5  /  DBili  x   /  AST  11  /  ALT  9<L>  /  AlkPhos  56  11-17    I&O's Detail    16 Nov 2022 07:01  -  17 Nov 2022 07:00  --------------------------------------------------------  IN:    Oral Fluid: 600 mL    sodium chloride 3%: 100 mL    sodium chloride 3%: 350 mL  Total IN: 1050 mL    OUT:    Voided (mL): 1250 mL  Total OUT: 1250 mL    Total NET: -200 mL      Historical Values  Osmolality, Random Urine: 371 mosm/kg (11.17.22 @ 03:38)   Osmolality, Random Urine: 450 mosm/kg (11.16.22 @ 20:48)   Osmolality, Random Urine: 392 mosm/kg (11.16.22 @ 09:29)   Osmolality, Random Urine: 489 mosm/kg (11.15.22 @ 21:52)   Osmolality, Random Urine: 386 mosm/kg (11.15.22 @ 10:53)   Osmolality, Random Urine: 123 mosm/kg (11.15.22 @ 01:49)   Osmolality, Random Urine: 135 mosm/kg (11.14.22 @ 21:34)     Historical Values  Osmolality, Serum: 253 mosm/kg (11.16.22 @ 04:29)   Osmolality, Serum: 251 mosm/kg (11.15.22 @ 01:49)   Osmolality, Serum: 254 mosm/kg (11.15.22 @ 00:10)     PHYSICAL EXAM:  GENERAL: Alert, awake, oriented x3  HEENT: OCTAVIO, EOMI, neck supple, no JVP  CHEST/LUNG: Bilateral clear breath sounds  HEART: Regular rate and rhythm, no murmur, no gallops, no rub   ABDOMEN: Soft, nontender, non distended  EXTREMITIES: no pedal edema  Neurology: AAOx3, no focal neurological deficit  SKIN: No rash or skin lesion

## 2022-11-17 NOTE — PROGRESS NOTE ADULT - ASSESSMENT
63 yo F w/ PMH HTN, DM, hypothyroidism, and breast cancer who presents for 2 weeks of lower extremity weakness, found to have Na 114, admitted to MICU for treatment of severe hyponatremia.    NEURO:  #Paranoid delusions  Patient has delusions about medications and previous medical care. Patient was prescribed medications at OSH 2 weeks ago but did not take them since she was concerned that they were toxic.  - Obtain collateral from family  - Psych consult to optimize care    PULMONARY:   #Concern for small cell lung cancer:  Pt reports 15-20 pack year smoking history and presents with hyponatremia with concern for SIADH. CXR clear, however pt meets criteria for lung cancer screening  - Consider low-dose CT as outpatient     CARDIOVASCULAR:  #PVCs   PVCs noted on telemetry. Pt asymptomatic denies CP or SOB. Likely i/s/o metabolic derangement.  - Continue cardiac monitoring with electrolyte repletion    #HTN:  Home med: Lisinopril 10 mg PO daily, nifedipine and metoprolol. Patient reported that she was not taking antihypertensives at home due to paranoia. sBP 180s this AM. 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 Na 114 on admission. s/p DDAVP 0.5 mg PO x1 dose, DDAVP 2 mcg IV x1 dose, 1L NS, 3% saline 50 cc/hr x2 hrs, 1L D5NS.   - c/w fluid restriction 800 cc/day  - HTS bolus 100cc, then 50cc/hr   - Target Na 127 today  - consult nephrology  - Monitor BMP q4h    #High anion gap metabolic acidosis  w/ respiratory compensation. Likely 2/2 starvation ketosis i/s/o decreased PO intake x2 weeks.   - Monitor phosphate for refeeding syndrome  - c/w thiamine repletion   - Monitor BMP q4h   - Encourage PO intake     ENDO:   #T2DM  Patient with history of T2DM. Found to have A1C 11.9%. AM glucose 157. Required 10u on 11/16. BG elevated to 269 and 260 pre-meal.  - mISS       ID:   #COVID-19:   On admission, leukocytosis with left shift noted. Pt has mild intermittent cough but otherwise asymptomatic  - Monitor respiratory status  - Continue to treat symptomatically       F: Fluid restrict to 800cc/day   E: K>4, mg >2.   N: Consistent carbohydrate diet   DVT ppx: Lovenox 40 mg BID   GI ppx: None  Code Status: Full code.   [facility]

## 2022-11-17 NOTE — PROGRESS NOTE ADULT - SUBJECTIVE AND OBJECTIVE BOX
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 Patient was recently admitted to OSH 2 weeks ago with similar presentation and was started on antihypertensives and antidiabetic medications which patient did not take due to paranoia; she also endorsed drinking 8-10 bottles of water a day and has paranoid delusions regarding poison being put in her medications. Patient refused to allow providers to contact OSH. In the MICU, patient arrived with BP to 208/153, was started on hydralazine and lisinopril which was increased to 40mg, patient reports getting upset and anxious and BP will increase to systolic 190s.She was also started on hypertonic saline and desmopressin, corrected to 122 with a rapid Cr rise for which renal was consulted; she was then fluid restricted and given 100cc hypertonic bolus of 3% saline, followed by 50cc/hr of 3% for 6 hours, gradually correcting to Na of128.    Patient says breast cancer was non-estrogen receptor positive, and she completed treatment for it in 2001. She endorses smoking ~10 cigarettes a day for ~30 years.     **INCOMPLETE NOTE    OVERNIGHT EVENTS:    SUBJECTIVE:  Patient seen and examined at bedside.    Vital Signs Last 12 Hrs  T(F): 97.2 (11-17-22 @ 13:23), Max: 97.6 (11-17-22 @ 06:05)  HR: 65 (11-17-22 @ 14:00) (55 - 73)  BP: 196/81 (11-17-22 @ 13:00) (130/60 - 204/86)  BP(mean): 115 (11-17-22 @ 13:00) (84 - 139)  RR: 22 (11-17-22 @ 14:00) (14 - 28)  SpO2: 95% (11-17-22 @ 14:00) (94% - 99%)  I&O's Summary    16 Nov 2022 07:01  -  17 Nov 2022 07:00  --------------------------------------------------------  IN: 1250 mL / OUT: 1250 mL / NET: 0 mL    17 Nov 2022 07:01  -  17 Nov 2022 14:21  --------------------------------------------------------  IN: 50 mL / OUT: 700 mL / NET: -650 mL        PHYSICAL EXAM:  Constitutional: NAD, comfortable in bed.  HEENT: NC/AT, PERRLA, EOMI, no conjunctival pallor or scleral icterus, MMM  Neck: Supple, no JVD  Respiratory: CTA B/L. No w/r/r.   Cardiovascular: RRR, normal S1 and S2, no m/r/g.   Gastrointestinal: +BS, soft NTND, no guarding or rebound tenderness, no palpable masses   Extremities: wwp; no cyanosis, clubbing or edema.   Vascular: Pulses equal and strong throughout.   Neurological: AAOx3, no CN deficits, strength and sensation intact throughout.   Skin: No gross skin abnormalities or rashes        LABS:                        13.6   9.56  )-----------( 323      ( 17 Nov 2022 03:38 )             39.0     11-17    128<L>  |  93<L>  |  13  ----------------------------<  178<H>  3.9   |  27  |  0.82    Ca    8.4      17 Nov 2022 03:38  Phos  2.3     11-17  Mg     1.9     11-17    TPro  6.6  /  Alb  3.2<L>  /  TBili  0.5  /  DBili  x   /  AST  11  /  ALT  9<L>  /  AlkPhos  56  11-17            RADIOLOGY & ADDITIONAL TESTS:    MEDICATIONS  (STANDING):  chlorhexidine 4% Liquid 1 Application(s) Topical <User Schedule>  dextrose 5%. 1000 milliLiter(s) (100 mL/Hr) IV Continuous <Continuous>  dextrose 5%. 1000 milliLiter(s) (50 mL/Hr) IV Continuous <Continuous>  dextrose 50% Injectable 25 Gram(s) IV Push once  dextrose 50% Injectable 12.5 Gram(s) IV Push once  dextrose 50% Injectable 25 Gram(s) IV Push once  enoxaparin Injectable 40 milliGRAM(s) SubCutaneous every 12 hours  glucagon  Injectable 1 milliGRAM(s) IntraMuscular once  influenza   Vaccine 0.5 milliLiter(s) IntraMuscular once  insulin lispro (ADMELOG) corrective regimen sliding scale   SubCutaneous Before meals and at bedtime  metoprolol succinate ER 50 milliGRAM(s) Oral daily    MEDICATIONS  (PRN):  dextrose Oral Gel 15 Gram(s) Oral once PRN Blood Glucose LESS THAN 70 milliGRAM(s)/deciliter           ****STEPDOWN FROM MICU TO [facility]***  63 yo F with PMH of HTN, DM, breast cancer, hypothyroidism, who presented with 2-3 days of generalized weakness found to have severe hyponatremia to 110 to 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 Patient was recently admitted to OSH 2 weeks ago with similar presentation and was started on antihypertensives and antidiabetic medications which patient did not take due to paranoia; she also endorsed drinking 8-10 bottles of water a day and has paranoid delusions regarding poison being put in her medications. Patient refused to allow providers to contact OSH. In the MICU, patient arrived with BP to 208/153, was started lisinopril which was increased to 40mg, patient reports getting upset and anxious and BP will increase to systolic 190s.She was also started on hypertonic saline and desmopressin, corrected to 122 with a rapid Cr rise for which renal was consulted; she was then fluid restricted and given 100cc hypertonic bolus of 3% saline, followed by 50cc/hr of 3% for 6 hours, gradually correcting to Na of128.  Patient incidentally found to have COVID, previously had intermittent cough which has resolved with supportive care.    Patient says breast cancer was non-estrogen receptor positive, and she completed treatment for it in 2001. She endorses smoking ~10 cigarettes a day for ~30 years.       SUBJECTIVE:  Patient seen and examined at bedside, tearful, anxious.    ROS: Denies: fever, chest pain, dyspnea, headache.     Vital Signs Last 12 Hrs  T(F): 97.2 (11-17-22 @ 13:23), Max: 97.6 (11-17-22 @ 06:05)  HR: 65 (11-17-22 @ 14:00) (55 - 73)  BP: 196/81 (11-17-22 @ 13:00) (130/60 - 204/86)  BP(mean): 115 (11-17-22 @ 13:00) (84 - 139)  RR: 22 (11-17-22 @ 14:00) (14 - 28)  SpO2: 95% (11-17-22 @ 14:00) (94% - 99%)  I&O's Summary    16 Nov 2022 07:01  -  17 Nov 2022 07:00  --------------------------------------------------------  IN: 1250 mL / OUT: 1250 mL / NET: 0 mL    17 Nov 2022 07:01  -  17 Nov 2022 14:21  --------------------------------------------------------  IN: 50 mL / OUT: 700 mL / NET: -650 mL        PHYSICAL EXAM:  General: Anxious, tearful.   HEENT: MMM  Neck: supple  Cardiovascular: +S1/S2; RRR  Respiratory: CTA B/L; no W/R/R  Gastrointestinal: soft, NT/ND, distended, bowel sounds in 4 quardrants.  Extremities: WWP; no edema, clubbing or cyanosis  Vascular: 2+ radial, DP/PT pulses B/L  Neurological: AAOx3; no focal deficits  Psych: AOx3, paranoid delusions present about medications, no auditory or visual hallucinations, able to redirect        LABS:                        13.6   9.56  )-----------( 323      ( 17 Nov 2022 03:38 )             39.0     11-17    128<L>  |  93<L>  |  13  ----------------------------<  178<H>  3.9   |  27  |  0.82    Ca    8.4      17 Nov 2022 03:38  Phos  2.3     11-17  Mg     1.9     11-17    TPro  6.6  /  Alb  3.2<L>  /  TBili  0.5  /  DBili  x   /  AST  11  /  ALT  9<L>  /  AlkPhos  56  11-17            RADIOLOGY & ADDITIONAL TESTS:    MEDICATIONS  (STANDING):  chlorhexidine 4% Liquid 1 Application(s) Topical <User Schedule>  dextrose 5%. 1000 milliLiter(s) (100 mL/Hr) IV Continuous <Continuous>  dextrose 5%. 1000 milliLiter(s) (50 mL/Hr) IV Continuous <Continuous>  dextrose 50% Injectable 25 Gram(s) IV Push once  dextrose 50% Injectable 12.5 Gram(s) IV Push once  dextrose 50% Injectable 25 Gram(s) IV Push once  enoxaparin Injectable 40 milliGRAM(s) SubCutaneous every 12 hours  glucagon  Injectable 1 milliGRAM(s) IntraMuscular once  influenza   Vaccine 0.5 milliLiter(s) IntraMuscular once  insulin lispro (ADMELOG) corrective regimen sliding scale   SubCutaneous Before meals and at bedtime  metoprolol succinate ER 50 milliGRAM(s) Oral daily    MEDICATIONS  (PRN):  dextrose Oral Gel 15 Gram(s) Oral once PRN Blood Glucose LESS THAN 70 milliGRAM(s)/deciliter

## 2022-11-17 NOTE — CONSULT NOTE ADULT - ATTENDING COMMENTS
Pt seen on rounds this afternoon.  82-yo woman with a history of HBP, type 2 DM and breast CA admitted with severe hyponatremia (114 meq/l), hypokalemia (2.7) and moderate hyperglycemia.  She is a poor historian, and has given conflicting history to different interviewers.  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  She has already partially corrected with hypertonic saline and fluid restriction, and has also been given dDAVP to treat overly rapid correction.    In terms of her diabetes, it appears that she was on metformin (which she seems to acknowledge after repeated questioning) and perhaps glipizide.  Her diet recall is almost certainly factitious.  Her glucoses have decreased to the 120-190 range for the most part on sliding scale coverage only.  Will start basal/bolus insulin (15 Lantus/4 units premeal) while she is in the hospital, but still plan to discharge on oral agents.  Although she gives a history of hypothyroidism and previous medication use, she says that the problem resolved, and her current TFTs are normal.  On exam, her thyroid gland is normal in size,.  Her skin turgor is normal and she has no edema.

## 2022-11-17 NOTE — PROGRESS NOTE ADULT - ATTENDING COMMENTS
morbid obesity, hyponatremia, DM2, psychiatric disorder, HTN  physical as above  Na improving though now looks more like SIADH  may need to look for pulmonary or brain pathology  follow sugars on sliding scale  refused psych intervention  titrate lisinopril and may add beta blocker  rest as above  decision making of high complexity

## 2022-11-17 NOTE — PROGRESS NOTE ADULT - ATTENDING COMMENTS
Na improved   monitor with fluid restriction   consider SIADH if urine remains concentrated off DDAVP - r/o malignancy related (hx breast Ca) seen with fellow and MS4 , agree with above  Na improved   monitor with fluid restriction   consider SIADH if urine remains concentrated off DDAVP - r/o malignancy related (hx breast Ca)

## 2022-11-17 NOTE — PROGRESS NOTE ADULT - PROBLEM SELECTOR PLAN 2
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 Na 114 on admission. s/p DDAVP 0.5 mg PO x1 dose, DDAVP 2 mcg IV x1 dose, 1L NS, 3% saline 50 cc/hr x2 hrs, 1L D5NS.   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 Na 114 on admission. s/p DDAVP 0.5 mg PO x1 dose, DDAVP 2 mcg IV x1 dose, 1L NS, 3% saline 50 cc/hr x2 hrs, 1L D5NS.   - c/w fluid restriction 800 cc/day, monitor Na levels. consider 1L restriction tomorrow.   - HTS bolus 100cc, then 50cc/hr
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
## 2022-11-17 NOTE — PROGRESS NOTE ADULT - ASSESSMENT
61 yo F with PMH of HTN, DM, breast cancer, hypothyroidism, who presented with 2-3 days of generalized weakness found to have severe hyponatremia 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 Patient was recently admitted to OSH 2 weeks ago with similar presentation and was started on antihypertensives and antidiabetic medications which patient did not take due to paranoia. Patient has paranoid delusions regarding poison being put in her medications.

## 2022-11-17 NOTE — PROGRESS NOTE ADULT - PROBLEM SELECTOR PLAN 3
Patient has delusions about medications and previous medical care. Patient was prescribed medications at OSH 2 weeks ago but did not take them since she was concerned that they were toxic. Patient currently refusing psych consult.   - Obtain collateral from family Patient has delusions about medications and previous medical care. Patient was prescribed medications at OSH 2 weeks ago but did not take them since she was concerned that they were toxic. Patient currently refusing psych consult.   - Obtain collateral from family  - If agitated consider seroquel

## 2022-11-17 NOTE — PROGRESS NOTE ADULT - PROBLEM SELECTOR PLAN 5
On admission, leukocytosis with left shift noted. Pt has mild intermittent cough but otherwise asymptomatic  - Monitor respiratory status  - Continue to treat symptomatically On admission, leukocytosis with left shift noted. Pt has mild intermittent cough but otherwise asymptomatic, on room air.   - Monitor respiratory status

## 2022-11-17 NOTE — PROGRESS NOTE ADULT - PROBLEM SELECTOR PLAN 6
Pt reports 15-20 pack year smoking history (~10 cigarettes/day but this varies) and presents with hyponatremia with concern for SIADH. CXR clear, however pt meets criteria for lung cancer screening  - Consider low-dose CT as outpatient

## 2022-11-17 NOTE — PROGRESS NOTE ADULT - SUBJECTIVE AND OBJECTIVE BOX
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 Patien    INTERVAL EVENTS: NAYELI    SUBJECTIVE / INTERVAL HPI: Patient seen and examined at bedside.     ROS: negative unless otherwise stated above.    VITAL SIGNS:  Vital Signs Last 24 Hrs  T(C): 36.1 (17 Nov 2022 09:56), Max: 36.6 (16 Nov 2022 14:08)  T(F): 97 (17 Nov 2022 09:56), Max: 97.9 (16 Nov 2022 14:08)  HR: 70 (17 Nov 2022 12:00) (55 - 90)  BP: 204/86 (17 Nov 2022 12:00) (130/60 - 212/87)  BP(mean): 124 (17 Nov 2022 12:00) (84 - 139)  RR: 27 (17 Nov 2022 12:00) (14 - 28)  SpO2: 99% (17 Nov 2022 12:00) (94% - 99%)    Parameters below as of 17 Nov 2022 12:00  Patient On (Oxygen Delivery Method): room air          11-16-22 @ 07:01  -  11-17-22 @ 07:00  --------------------------------------------------------  IN: 1250 mL / OUT: 1250 mL / NET: 0 mL    11-17-22 @ 07:01  -  11-17-22 @ 12:39  --------------------------------------------------------  IN: 50 mL / OUT: 350 mL / NET: -300 mL        PHYSICAL EXAM:    General: NAD  HEENT: MMM  Neck: supple  Cardiovascular: +S1/S2; RRR  Respiratory: CTA B/L; no W/R/R  Gastrointestinal: soft, NT/ND  Extremities: WWP; no edema, clubbing or cyanosis  Vascular: 2+ radial, DP/PT pulses B/L  Neurological: AAOx3; no focal deficits    MEDICATIONS:  MEDICATIONS  (STANDING):  chlorhexidine 4% Liquid 1 Application(s) Topical <User Schedule>  cloNIDine 0.1 milliGRAM(s) Oral every 12 hours  dextrose 5%. 1000 milliLiter(s) (50 mL/Hr) IV Continuous <Continuous>  dextrose 5%. 1000 milliLiter(s) (100 mL/Hr) IV Continuous <Continuous>  dextrose 50% Injectable 25 Gram(s) IV Push once  dextrose 50% Injectable 12.5 Gram(s) IV Push once  dextrose 50% Injectable 25 Gram(s) IV Push once  enoxaparin Injectable 40 milliGRAM(s) SubCutaneous every 12 hours  glucagon  Injectable 1 milliGRAM(s) IntraMuscular once  influenza   Vaccine 0.5 milliLiter(s) IntraMuscular once  insulin lispro (ADMELOG) corrective regimen sliding scale   SubCutaneous Before meals and at bedtime  lidocaine   4% Patch 1 Patch Transdermal once  lisinopril 20 milliGRAM(s) Oral daily  metoprolol succinate ER 50 milliGRAM(s) Oral daily    MEDICATIONS  (PRN):  dextrose Oral Gel 15 Gram(s) Oral once PRN Blood Glucose LESS THAN 70 milliGRAM(s)/deciliter      ALLERGIES:  Allergies    No Known Allergies    Intolerances    strawberry (Pruritus)      LABS:                        13.6   9.56  )-----------( 323      ( 17 Nov 2022 03:38 )             39.0     11-17    128<L>  |  93<L>  |  13  ----------------------------<  178<H>  3.9   |  27  |  0.82    Ca    8.4      17 Nov 2022 03:38  Phos  2.3     11-17  Mg     1.9     11-17    TPro  6.6  /  Alb  3.2<L>  /  TBili  0.5  /  DBili  x   /  AST  11  /  ALT  9<L>  /  AlkPhos  56  11-17        CAPILLARY BLOOD GLUCOSE      POCT Blood Glucose.: 218 mg/dL (17 Nov 2022 12:12)      RADIOLOGY & ADDITIONAL TESTS: Reviewed.   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 Patient was recently admitted to OSH 2 weeks ago with similar presentation and was started on antihypertensives and antidiabetic medications which patient did not take due to paranoia. Patient has paranoid delusions regarding poison being put in her medications.     INTERVAL EVENTS: NAYELI    SUBJECTIVE / INTERVAL HPI: Patient seen and examined at bedside.     ROS: negative unless otherwise stated above.    VITAL SIGNS:  Vital Signs Last 24 Hrs  T(C): 36.1 (17 Nov 2022 09:56), Max: 36.6 (16 Nov 2022 14:08)  T(F): 97 (17 Nov 2022 09:56), Max: 97.9 (16 Nov 2022 14:08)  HR: 70 (17 Nov 2022 12:00) (55 - 90)  BP: 204/86 (17 Nov 2022 12:00) (130/60 - 212/87)  BP(mean): 124 (17 Nov 2022 12:00) (84 - 139)  RR: 27 (17 Nov 2022 12:00) (14 - 28)  SpO2: 99% (17 Nov 2022 12:00) (94% - 99%)    Parameters below as of 17 Nov 2022 12:00  Patient On (Oxygen Delivery Method): room air          11-16-22 @ 07:01  -  11-17-22 @ 07:00  --------------------------------------------------------  IN: 1250 mL / OUT: 1250 mL / NET: 0 mL    11-17-22 @ 07:01  -  11-17-22 @ 12:39  --------------------------------------------------------  IN: 50 mL / OUT: 350 mL / NET: -300 mL        PHYSICAL EXAM:    General: NAD  HEENT: MMM  Neck: supple  Cardiovascular: +S1/S2; RRR  Respiratory: CTA B/L; no W/R/R  Gastrointestinal: soft, NT/ND  Extremities: WWP; no edema, clubbing or cyanosis  Vascular: 2+ radial, DP/PT pulses B/L  Neurological: AAOx3; no focal deficits    MEDICATIONS:  MEDICATIONS  (STANDING):  chlorhexidine 4% Liquid 1 Application(s) Topical <User Schedule>  cloNIDine 0.1 milliGRAM(s) Oral every 12 hours  dextrose 5%. 1000 milliLiter(s) (50 mL/Hr) IV Continuous <Continuous>  dextrose 5%. 1000 milliLiter(s) (100 mL/Hr) IV Continuous <Continuous>  dextrose 50% Injectable 25 Gram(s) IV Push once  dextrose 50% Injectable 12.5 Gram(s) IV Push once  dextrose 50% Injectable 25 Gram(s) IV Push once  enoxaparin Injectable 40 milliGRAM(s) SubCutaneous every 12 hours  glucagon  Injectable 1 milliGRAM(s) IntraMuscular once  influenza   Vaccine 0.5 milliLiter(s) IntraMuscular once  insulin lispro (ADMELOG) corrective regimen sliding scale   SubCutaneous Before meals and at bedtime  lidocaine   4% Patch 1 Patch Transdermal once  lisinopril 20 milliGRAM(s) Oral daily  metoprolol succinate ER 50 milliGRAM(s) Oral daily    MEDICATIONS  (PRN):  dextrose Oral Gel 15 Gram(s) Oral once PRN Blood Glucose LESS THAN 70 milliGRAM(s)/deciliter      ALLERGIES:  Allergies    No Known Allergies    Intolerances    strawberry (Pruritus)      LABS:                        13.6   9.56  )-----------( 323      ( 17 Nov 2022 03:38 )             39.0     11-17    128<L>  |  93<L>  |  13  ----------------------------<  178<H>  3.9   |  27  |  0.82    Ca    8.4      17 Nov 2022 03:38  Phos  2.3     11-17  Mg     1.9     11-17    TPro  6.6  /  Alb  3.2<L>  /  TBili  0.5  /  DBili  x   /  AST  11  /  ALT  9<L>  /  AlkPhos  56  11-17        CAPILLARY BLOOD GLUCOSE      POCT Blood Glucose.: 218 mg/dL (17 Nov 2022 12:12)      RADIOLOGY & ADDITIONAL TESTS: Reviewed.   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 Patient was recently admitted to OSH 2 weeks ago with similar presentation and was started on metoprolol, nifedipine and antidiabetics, which patient did not take due to paranoia. Patient has paranoid delusions regarding poison being put in her medications. Psych was consulted however patient refused to see them. For HTN, she was started on lisinopril but she has intermittently refused BP medications. 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 Sodium has improved with fluid restriction. Patient incidentally found to have COVID, previously had intermittent cough which has resolved with supportive care.      INTERVAL EVENTS: NAYELI    SUBJECTIVE / INTERVAL HPI: Patient seen and examined at bedside.     ROS: negative unless otherwise stated above.    VITAL SIGNS:  Vital Signs Last 24 Hrs  T(C): 36.1 (17 Nov 2022 09:56), Max: 36.6 (16 Nov 2022 14:08)  T(F): 97 (17 Nov 2022 09:56), Max: 97.9 (16 Nov 2022 14:08)  HR: 70 (17 Nov 2022 12:00) (55 - 90)  BP: 204/86 (17 Nov 2022 12:00) (130/60 - 212/87)  BP(mean): 124 (17 Nov 2022 12:00) (84 - 139)  RR: 27 (17 Nov 2022 12:00) (14 - 28)  SpO2: 99% (17 Nov 2022 12:00) (94% - 99%)    Parameters below as of 17 Nov 2022 12:00  Patient On (Oxygen Delivery Method): room air          11-16-22 @ 07:01  -  11-17-22 @ 07:00  --------------------------------------------------------  IN: 1250 mL / OUT: 1250 mL / NET: 0 mL    11-17-22 @ 07:01  -  11-17-22 @ 12:39  --------------------------------------------------------  IN: 50 mL / OUT: 350 mL / NET: -300 mL        PHYSICAL EXAM:    General: NAD  HEENT: MMM  Neck: supple  Cardiovascular: +S1/S2; RRR  Respiratory: CTA B/L; no W/R/R  Gastrointestinal: soft, NT/ND  Extremities: WWP; no edema, clubbing or cyanosis  Vascular: 2+ radial, DP/PT pulses B/L  Neurological: AAOx3; no focal deficits    MEDICATIONS:  MEDICATIONS  (STANDING):  chlorhexidine 4% Liquid 1 Application(s) Topical <User Schedule>  cloNIDine 0.1 milliGRAM(s) Oral every 12 hours  dextrose 5%. 1000 milliLiter(s) (50 mL/Hr) IV Continuous <Continuous>  dextrose 5%. 1000 milliLiter(s) (100 mL/Hr) IV Continuous <Continuous>  dextrose 50% Injectable 25 Gram(s) IV Push once  dextrose 50% Injectable 12.5 Gram(s) IV Push once  dextrose 50% Injectable 25 Gram(s) IV Push once  enoxaparin Injectable 40 milliGRAM(s) SubCutaneous every 12 hours  glucagon  Injectable 1 milliGRAM(s) IntraMuscular once  influenza   Vaccine 0.5 milliLiter(s) IntraMuscular once  insulin lispro (ADMELOG) corrective regimen sliding scale   SubCutaneous Before meals and at bedtime  lidocaine   4% Patch 1 Patch Transdermal once  lisinopril 20 milliGRAM(s) Oral daily  metoprolol succinate ER 50 milliGRAM(s) Oral daily    MEDICATIONS  (PRN):  dextrose Oral Gel 15 Gram(s) Oral once PRN Blood Glucose LESS THAN 70 milliGRAM(s)/deciliter      ALLERGIES:  Allergies    No Known Allergies    Intolerances    strawberry (Pruritus)      LABS:                        13.6   9.56  )-----------( 323      ( 17 Nov 2022 03:38 )             39.0     11-17    128<L>  |  93<L>  |  13  ----------------------------<  178<H>  3.9   |  27  |  0.82    Ca    8.4      17 Nov 2022 03:38  Phos  2.3     11-17  Mg     1.9     11-17    TPro  6.6  /  Alb  3.2<L>  /  TBili  0.5  /  DBili  x   /  AST  11  /  ALT  9<L>  /  AlkPhos  56  11-17        CAPILLARY BLOOD GLUCOSE      POCT Blood Glucose.: 218 mg/dL (17 Nov 2022 12:12)      RADIOLOGY & ADDITIONAL TESTS: Reviewed.

## 2022-11-17 NOTE — CONSULT NOTE ADULT - NS ATTEST RISKLEV GEN_ALL_CORE
[facility]  Daily Progress Note  Peyton Hurtado  MRN: [id number] LOS: 7    Admit Date: 5/5/2020 5/12/2020 7:54 AM          Chief Complaint:  Acute ischemic stroke with left hemiparesis    Interval History:    Reviewed overnight events and nursing notes. Status:  not changed  Patient awake no distress  No report of chest pain or shortness of breath  Appetite good despite dysphasia  Bowel function normal      DIET:  Dietary Nutrition Supplements: Other Oral Supplement (see comment)  DIET DYSPHAGIA PUREED; Carb Control: 4 carb choices (60 gms)/meal; Mildly Thick (Nectar)    Medications:      metFORMIN  500 mg Oral BID WC    citalopram  40 mg Oral Daily    clopidogrel  75 mg Oral Daily    atorvastatin  80 mg Oral Nightly    aspirin  81 mg Oral Daily    busPIRone  15 mg Oral BID    linagliptin  5 mg Oral Daily    dabigatran  150 mg Oral BID    polyethylene glycol  17 g Oral Daily    amLODIPine  5 mg Oral Daily    And    lisinopril  20 mg Oral Daily       Data:     Code Status: Full Code    No family history on file.   Social History     Socioeconomic History    Marital status:      Spouse name: Not on file    Number of children: Not on file    Years of education: Not on file    Highest education level: Not on file   Occupational History    Not on file   Social Needs    Financial resource strain: Not on file    Food insecurity     Worry: Not on file     Inability: Not on file    Transportation needs     Medical: Not on file     Non-medical: Not on file   Tobacco Use    Smoking status: Never Smoker    Smokeless tobacco: Never Used   Substance and Sexual Activity    Alcohol use: Not Currently    Drug use: Never    Sexual activity: Not Currently   Lifestyle    Physical activity     Days per week: Not on file     Minutes per session: Not on file    Stress: Not on file   Relationships    Social connections     Talks on phone: Not on file     Gets together: Not on file     Attends Jew service: Not on file     Active member of club or organization: Not on file     Attends meetings of clubs or organizations: Not on file     Relationship status: Not on file    Intimate partner violence     Fear of current or ex partner: Not on file     Emotionally abused: Not on file     Physically abused: Not on file     Forced sexual activity: Not on file   Other Topics Concern    Not on file   Social History Narrative    Not on file       Labs:  Hematology:  Recent Labs     20  0407   WBC 11.9*   HGB 13.0   HCT 39.5        Chemistry:  Recent Labs     20  040      K 3.7      CO2 28   GLUCOSE 148*   BUN 33*   CREATININE 1.4*   ANIONGAP 16   LABGLOM 37*   CALCIUM 9.2     No results for input(s): PROT, LABALBU, LABA1C, Q1AHONE, J9KQQYM, FT4, TSH, AST, ALT, LDH, GGT, ALKPHOS, BILITOT, BILIDIR, AMMONIA, AMYLASE, LIPASE, LACTATE, CHOL, HDL, LDLCHOLESTEROL, CHOLHDLRATIO, TRIG, VLDL, PHENYTOIN, PHENYF in the last 72 hours. Objective:     Vitals: /83   Pulse 89   Temp 96.4 °F (35.8 °C) (Temporal)   Resp 16   Ht 5' (1.524 m)   Wt 152 lb 4.8 oz (69.1 kg)   SpO2 97%   BMI 29.74 kg/m²      Intake/Output Summary (Last 24 hours) at 2020 0753  Last data filed at 2020 0748  Gross per 24 hour   Intake 980 ml   Output --   Net 980 ml    Temp (24hrs), Av.3 °F (36.3 °C), Min:96.4 °F (35.8 °C), Max:98.1 °F (36.7 °C)    Glucose:  No results for input(s): POCGLU in the last 72 hours. Physical Examination:   Constitutional: oriented to person, place, and time. appears well-developed. HEENT:   Head: Normocephalic and atraumatic. Eyes: Pupils are equal, round, and reactive to light. Neck: Neck supple. Cardiovascular: Regular rhythm and normal heart sounds. No lift or rub appreciated. Pulmonary/Chest: Effort normal and breath sounds normal. CTAB. No labored breating. Abdominal: Soft. Bowel sounds are normal. There is no appreciable  distension.  There is no Moderate

## 2022-11-17 NOTE — PROGRESS NOTE ADULT - ATTENDING COMMENTS
Pt admitted with hyponatremia and noted to have htn urgency. will dc lisinopril and start hydralazine 50 tid  in light of hyponatremia. Will cw fluid restriction today and fu NA in am. will plan nasim discharge in 24 -48 hours. will fu endo recs as pt is likely not able to take insulin  would appreciate final recs for oral management

## 2022-11-17 NOTE — PROGRESS NOTE ADULT - PROBLEM SELECTOR PLAN 7
w/ respiratory compensation. Likely 2/2 starvation ketosis i/s/o decreased PO intake x2 weeks.   - Monitor phosphate for refeeding syndrome  - c/w thiamine repletion   - Monitor BMP q4h   - Encourage PO intake w/ respiratory compensation. Likely 2/2 starvation ketosis i/s/o decreased PO intake x2 weeks. Resolved.   - Monitor phosphate for refeeding syndrome  - c/w thiamine repletion   - Encourage PO intake

## 2022-11-17 NOTE — PROGRESS NOTE ADULT - PROBLEM SELECTOR PLAN 8
PVCs noted on telemetry. Pt asymtomatic, denies CP or SOB. Likely i/s/o metabolic derangement.  - Continue cardiac monitoring with electrolyte repletion

## 2022-11-17 NOTE — PROGRESS NOTE ADULT - PROBLEM SELECTOR PLAN 1
Home med: Lisinopril 10 mg PO daily, nifedipine and metoprolol. Patient reported that she was not taking antihypertensives at home due to paranoia. Lisinopril increased to 40mg qd on CCU.   - c/w lisinopril 40mg  - monitor BP Home med: Lisinopril 10 mg PO daily, nifedipine and metoprolol. Patient reported that she was not taking antihypertensives at home due to paranoia. Lisinopril increased to 40mg qd on CCU.   - d/c lisinopril 40mg  - monitor BP Home med: Lisinopril 10 mg PO daily, nifedipine and metoprolol. Patient reported that she was not taking antihypertensives at home due to paranoia. Lisinopril increased to 40mg qd on CCU.   - d/c lisinopril 40mg  - Start hydralazine 50mg TID  - c/w metoprolol 50mg qd.  - monitor BP

## 2022-11-18 ENCOUNTER — TRANSCRIPTION ENCOUNTER (OUTPATIENT)
Age: 62
End: 2022-11-18

## 2022-11-18 VITALS
RESPIRATION RATE: 18 BRPM | HEART RATE: 75 BPM | DIASTOLIC BLOOD PRESSURE: 84 MMHG | SYSTOLIC BLOOD PRESSURE: 165 MMHG | OXYGEN SATURATION: 99 % | TEMPERATURE: 98 F

## 2022-11-18 LAB
ANION GAP SERPL CALC-SCNC: 9 MMOL/L — SIGNIFICANT CHANGE UP (ref 5–17)
BASOPHILS # BLD AUTO: 0.12 K/UL — SIGNIFICANT CHANGE UP (ref 0–0.2)
BASOPHILS NFR BLD AUTO: 1.2 % — SIGNIFICANT CHANGE UP (ref 0–2)
BUN SERPL-MCNC: 16 MG/DL — SIGNIFICANT CHANGE UP (ref 7–23)
CALCIUM SERPL-MCNC: 8.7 MG/DL — SIGNIFICANT CHANGE UP (ref 8.4–10.5)
CHLORIDE SERPL-SCNC: 95 MMOL/L — LOW (ref 96–108)
CO2 SERPL-SCNC: 26 MMOL/L — SIGNIFICANT CHANGE UP (ref 22–31)
CREAT SERPL-MCNC: 0.81 MG/DL — SIGNIFICANT CHANGE UP (ref 0.5–1.3)
EGFR: 82 ML/MIN/1.73M2 — SIGNIFICANT CHANGE UP
EOSINOPHIL # BLD AUTO: 0.11 K/UL — SIGNIFICANT CHANGE UP (ref 0–0.5)
EOSINOPHIL NFR BLD AUTO: 1.1 % — SIGNIFICANT CHANGE UP (ref 0–6)
GLUCOSE BLDC GLUCOMTR-MCNC: 120 MG/DL — HIGH (ref 70–99)
GLUCOSE BLDC GLUCOMTR-MCNC: 155 MG/DL — HIGH (ref 70–99)
GLUCOSE BLDC GLUCOMTR-MCNC: 206 MG/DL — HIGH (ref 70–99)
GLUCOSE SERPL-MCNC: 176 MG/DL — HIGH (ref 70–99)
HCT VFR BLD CALC: 41.3 % — SIGNIFICANT CHANGE UP (ref 34.5–45)
HGB BLD-MCNC: 14 G/DL — SIGNIFICANT CHANGE UP (ref 11.5–15.5)
IMM GRANULOCYTES NFR BLD AUTO: 0.5 % — SIGNIFICANT CHANGE UP (ref 0–0.9)
LYMPHOCYTES # BLD AUTO: 2.57 K/UL — SIGNIFICANT CHANGE UP (ref 1–3.3)
LYMPHOCYTES # BLD AUTO: 26.5 % — SIGNIFICANT CHANGE UP (ref 13–44)
MAGNESIUM SERPL-MCNC: 2 MG/DL — SIGNIFICANT CHANGE UP (ref 1.6–2.6)
MCHC RBC-ENTMCNC: 28.1 PG — SIGNIFICANT CHANGE UP (ref 27–34)
MCHC RBC-ENTMCNC: 33.9 GM/DL — SIGNIFICANT CHANGE UP (ref 32–36)
MCV RBC AUTO: 82.9 FL — SIGNIFICANT CHANGE UP (ref 80–100)
MONOCYTES # BLD AUTO: 0.68 K/UL — SIGNIFICANT CHANGE UP (ref 0–0.9)
MONOCYTES NFR BLD AUTO: 7 % — SIGNIFICANT CHANGE UP (ref 2–14)
NEUTROPHILS # BLD AUTO: 6.15 K/UL — SIGNIFICANT CHANGE UP (ref 1.8–7.4)
NEUTROPHILS NFR BLD AUTO: 63.7 % — SIGNIFICANT CHANGE UP (ref 43–77)
NRBC # BLD: 0 /100 WBCS — SIGNIFICANT CHANGE UP (ref 0–0)
OSMOLALITY UR: 104 MOSM/KG — LOW (ref 300–900)
PHOSPHATE SERPL-MCNC: 2.9 MG/DL — SIGNIFICANT CHANGE UP (ref 2.5–4.5)
PLATELET # BLD AUTO: 342 K/UL — SIGNIFICANT CHANGE UP (ref 150–400)
POTASSIUM SERPL-MCNC: 3.8 MMOL/L — SIGNIFICANT CHANGE UP (ref 3.5–5.3)
POTASSIUM SERPL-SCNC: 3.8 MMOL/L — SIGNIFICANT CHANGE UP (ref 3.5–5.3)
RBC # BLD: 4.98 M/UL — SIGNIFICANT CHANGE UP (ref 3.8–5.2)
RBC # FLD: 14.2 % — SIGNIFICANT CHANGE UP (ref 10.3–14.5)
SODIUM SERPL-SCNC: 130 MMOL/L — LOW (ref 135–145)
SODIUM UR-SCNC: 20 MMOL/L — SIGNIFICANT CHANGE UP
WBC # BLD: 9.68 K/UL — SIGNIFICANT CHANGE UP (ref 3.8–10.5)
WBC # FLD AUTO: 9.68 K/UL — SIGNIFICANT CHANGE UP (ref 3.8–10.5)

## 2022-11-18 PROCEDURE — 80307 DRUG TEST PRSMV CHEM ANLYZR: CPT

## 2022-11-18 PROCEDURE — 83930 ASSAY OF BLOOD OSMOLALITY: CPT

## 2022-11-18 PROCEDURE — 87635 SARS-COV-2 COVID-19 AMP PRB: CPT

## 2022-11-18 PROCEDURE — 97530 THERAPEUTIC ACTIVITIES: CPT

## 2022-11-18 PROCEDURE — 84300 ASSAY OF URINE SODIUM: CPT

## 2022-11-18 PROCEDURE — 84540 ASSAY OF URINE/UREA-N: CPT

## 2022-11-18 PROCEDURE — 84295 ASSAY OF SERUM SODIUM: CPT

## 2022-11-18 PROCEDURE — 84100 ASSAY OF PHOSPHORUS: CPT

## 2022-11-18 PROCEDURE — 85025 COMPLETE CBC W/AUTO DIFF WBC: CPT

## 2022-11-18 PROCEDURE — 97161 PT EVAL LOW COMPLEX 20 MIN: CPT

## 2022-11-18 PROCEDURE — 84436 ASSAY OF TOTAL THYROXINE: CPT

## 2022-11-18 PROCEDURE — 80048 BASIC METABOLIC PNL TOTAL CA: CPT

## 2022-11-18 PROCEDURE — 82803 BLOOD GASES ANY COMBINATION: CPT

## 2022-11-18 PROCEDURE — 82962 GLUCOSE BLOOD TEST: CPT

## 2022-11-18 PROCEDURE — 99231 SBSQ HOSP IP/OBS SF/LOW 25: CPT

## 2022-11-18 PROCEDURE — 85610 PROTHROMBIN TIME: CPT

## 2022-11-18 PROCEDURE — 71046 X-RAY EXAM CHEST 2 VIEWS: CPT

## 2022-11-18 PROCEDURE — 84443 ASSAY THYROID STIM HORMONE: CPT

## 2022-11-18 PROCEDURE — 84132 ASSAY OF SERUM POTASSIUM: CPT

## 2022-11-18 PROCEDURE — 82330 ASSAY OF CALCIUM: CPT

## 2022-11-18 PROCEDURE — 84484 ASSAY OF TROPONIN QUANT: CPT

## 2022-11-18 PROCEDURE — 83036 HEMOGLOBIN GLYCOSYLATED A1C: CPT

## 2022-11-18 PROCEDURE — 99239 HOSP IP/OBS DSCHRG MGMT >30: CPT | Mod: GC

## 2022-11-18 PROCEDURE — 96374 THER/PROPH/DIAG INJ IV PUSH: CPT

## 2022-11-18 PROCEDURE — 97116 GAIT TRAINING THERAPY: CPT

## 2022-11-18 PROCEDURE — 83935 ASSAY OF URINE OSMOLALITY: CPT

## 2022-11-18 PROCEDURE — 36415 COLL VENOUS BLD VENIPUNCTURE: CPT

## 2022-11-18 PROCEDURE — 96375 TX/PRO/DX INJ NEW DRUG ADDON: CPT

## 2022-11-18 PROCEDURE — 85730 THROMBOPLASTIN TIME PARTIAL: CPT

## 2022-11-18 PROCEDURE — 86803 HEPATITIS C AB TEST: CPT

## 2022-11-18 PROCEDURE — 99285 EMERGENCY DEPT VISIT HI MDM: CPT | Mod: 25

## 2022-11-18 PROCEDURE — 80053 COMPREHEN METABOLIC PANEL: CPT

## 2022-11-18 PROCEDURE — 81001 URINALYSIS AUTO W/SCOPE: CPT

## 2022-11-18 PROCEDURE — 83735 ASSAY OF MAGNESIUM: CPT

## 2022-11-18 PROCEDURE — 82570 ASSAY OF URINE CREATININE: CPT

## 2022-11-18 PROCEDURE — 99231 SBSQ HOSP IP/OBS SF/LOW 25: CPT | Mod: GC

## 2022-11-18 RX ORDER — METFORMIN HYDROCHLORIDE 850 MG/1
1 TABLET ORAL
Qty: 60 | Refills: 6
Start: 2022-11-18 | End: 2023-06-15

## 2022-11-18 RX ORDER — GLIMEPIRIDE 1 MG
1 TABLET ORAL
Qty: 30 | Refills: 6
Start: 2022-11-18 | End: 2023-06-15

## 2022-11-18 RX ORDER — METOPROLOL TARTRATE 50 MG
1 TABLET ORAL
Qty: 0 | Refills: 0 | DISCHARGE
Start: 2022-11-18

## 2022-11-18 RX ORDER — HYDRALAZINE HCL 50 MG
1 TABLET ORAL
Qty: 90 | Refills: 2
Start: 2022-11-18 | End: 2023-02-15

## 2022-11-18 RX ADMIN — Medication 50 MILLIGRAM(S): at 14:08

## 2022-11-18 RX ADMIN — ENOXAPARIN SODIUM 40 MILLIGRAM(S): 100 INJECTION SUBCUTANEOUS at 06:21

## 2022-11-18 RX ADMIN — Medication 4 UNIT(S): at 10:03

## 2022-11-18 RX ADMIN — Medication 50 MILLIGRAM(S): at 06:21

## 2022-11-18 RX ADMIN — Medication 4 UNIT(S): at 12:50

## 2022-11-18 RX ADMIN — Medication 2: at 09:30

## 2022-11-18 NOTE — DISCHARGE NOTE NURSING/CASE MANAGEMENT/SOCIAL WORK - PATIENT PORTAL LINK FT
You can access the FollowMyHealth Patient Portal offered by [facility] by registering at the following website: [email or web address]. By joining [employer]’s FollowMyHealth portal, you will also be able to view your health information using other applications (apps) compatible with our system.

## 2022-11-18 NOTE — PROGRESS NOTE ADULT - ATTENDING COMMENTS
Pt pw generalized weakness and admitted with hyponatremia 114 and htn urgency. Pt was in MICU and was treated with hypertonic saline -> then fluid restriction. Na slowly up trended to 130 today. 11/17 pts home medication of lisinopril was discontinued and started on hydralazine 50 tid  in light of hyponatremia. Will cw fluid restriction at home to 1 L and have pt follow up with outpt provider for repeat labs in 1 week. Pending final endo recs and consideration for oral management as pt is non compliant with insulin therapy

## 2022-11-18 NOTE — PROGRESS NOTE ADULT - SUBJECTIVE AND OBJECTIVE BOX
OVERNIGHT: No acute events overnight.   SUBJECTIVE: Patient was seen and examined this morning, patient had no new complaints    CAPILLARY BLOOD GLUCOSE & INSULIN RECEIVED  Yesterday  - Dinner FS mg/dL = 4 units of Lispro sliding scale. Pt had chicken sandwich with tomato and lettuce  - Bedtime FS mg/dL = 4 units Lispro sliding scale and lantus 15 units    Today  - Breakfast FS mg/dL =4 units of Lispro sliding scale. Did not have any breakfast  - Lunch FS mg/dL = 0 units of Lispro sliding scale. Chicken sandwich with tomato and lettuce    206 mg/dL ( @ 17:40)  120 mg/dL ( @ 11:50)  155 mg/dL ( @ 09:02)  217 mg/dL ( @ 22:43)    REVIEW OF SYSTEMS  Constitutional:  Negative fever, chills or loss of appetite.  Eyes:  Negative blurry vision or double vision.  Cardiovascular:  Negative for chest pain or palpitations.  Respiratory:  Negative for cough, wheezing, or shortness of breath.    Gastrointestinal:  Negative for nausea, vomiting, diarrhea, constipation, or abdominal pain.  Genitourinary:  Negative frequency, urgency or dysuria.  Neurologic:  No headache, confusion, dizziness, lightheadedness.    PHYSICAL EXAM  Vital Signs Last 24 Hrs  T(C): 36.5 (2022 16:00), Max: 37.1 (2022 21:38)  T(F): 97.7 (2022 16:00), Max: 98.7 (2022 21:38)  HR: 75 (2022 16:00) (64 - 75)  BP: 165/84 (2022 16:00) (137/80 - 169/81)  BP(mean): --  RR: 18 (2022 16:00) (18 - 19)  SpO2: 99% (2022 16:00) (98% - 99%)    Parameters below as of 2022 16:00  Patient On (Oxygen Delivery Method): room air        Constitutional: Awake, alert, in no acute distress.   HEENT: Normocephalic, atraumatic, OCTAVIO, no proptosis or lid retraction.   Neck: supple, no acanthosis, no thyromegaly or palpable thyroid nodules.  Respiratory: Lungs clear to ausculation bilaterally.   Cardiovascular: regular rhythm, normal S1 and S2, no audible murmurs.   GI: soft, non-tender, non-distended, bowel sounds present, no masses appreciated.  Extremities: No lower extremity edema, peripheral pulses present. Dry extremities  Skin: no rashes.   Psychiatric: AAO x 3. Normal affect/mood.     LABS  CBC - WBC/HGB/HTC/PLT: 9.68/14.0/41.3/342 (22)  BMP - Na/K/Cl/Bicarb/BUN/Cr/Gluc/AG/eGFR: 130/3.8/95/26/16/0.81/176/9/82 (22)  Ca - 8.7 (22)  Phos - 2.9 (22)  Mg - 2.0 (22)  LFT - Alb/Tprot/Tbili/Dbili/AlkPhos/ALT/AST: 3.2/--/0.5/--/56/9/11 (22)  PT/aPTT/INR: 11.7/31.0/0.98 (22)   Thyroid Stimulating Hormone, Serum: 1.530 (22)  Total T4/Free T4: 7.53/-- (22)    MEDICATIONS  MEDICATIONS  (STANDING):  chlorhexidine 4% Liquid 1 Application(s) Topical <User Schedule>  dextrose 5%. 1000 milliLiter(s) (100 mL/Hr) IV Continuous <Continuous>  dextrose 5%. 1000 milliLiter(s) (50 mL/Hr) IV Continuous <Continuous>  dextrose 50% Injectable 25 Gram(s) IV Push once  dextrose 50% Injectable 12.5 Gram(s) IV Push once  dextrose 50% Injectable 25 Gram(s) IV Push once  enoxaparin Injectable 40 milliGRAM(s) SubCutaneous every 12 hours  glucagon  Injectable 1 milliGRAM(s) IntraMuscular once  hydrALAZINE 50 milliGRAM(s) Oral three times a day  influenza   Vaccine 0.5 milliLiter(s) IntraMuscular once  insulin glargine Injectable (LANTUS) 15 Unit(s) SubCutaneous at bedtime  insulin lispro (ADMELOG) corrective regimen sliding scale   SubCutaneous Before meals and at bedtime  insulin lispro Injectable (ADMELOG) 4 Unit(s) SubCutaneous three times a day before meals  metoprolol succinate ER 50 milliGRAM(s) Oral daily    MEDICATIONS  (PRN):  dextrose Oral Gel 15 Gram(s) Oral once PRN Blood Glucose LESS THAN 70 milliGRAM(s)/deciliter    ASSESSMENT / RECOMMENDATIONS  62y Female, from home, lives alone, uses a walker, with med hx significant for HTN, DM, Hypothyroidism, Left sided Breast cancer s/p Lumpectomy, presenting to the hospital with complaints of lower extremity weakness for the last 2 weeks prior to the hospital visit, admitted to ICU for multiple electrolyte derangements w/severe hyponatremia of 114, 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 Endo consulted for Uncontrolled hyperglycemia w/A1c of 11.9.    A1C: 11.9 %  BUN: 16 mg/dL  Creatinine: 0.81 mg/dL  eGFR: 82 mL/min/1.73m2  Weight (kg): 99.8  BMI (kg/m2): 37.7      # Type 2 Diabetes mellitus  - As patient is planned for discharge today, and we are uncertain if she ever was compliant on her oral regimen- will recommend discharging with Glimiperide 1 mg and Metformin 500 mg BID  - Patient can follow up at discharge with [facility] by calling [phone] to make an appointment.      Case discussed with Dr. Ty. Primary team updated.

## 2022-11-18 NOTE — DISCHARGE NOTE PROVIDER - NSDCMRMEDTOKEN_GEN_ALL_CORE_FT
metoprolol succinate 50 mg oral tablet, extended release: 1 tab(s) orally once a day   hydrALAZINE 50 mg oral tablet: 1 tab(s) orally 3 times a day  metoprolol succinate 50 mg oral tablet, extended release: 1 tab(s) orally once a day   glimepiride 1 mg oral tablet: 1 tab(s) orally once a day   hydrALAZINE 50 mg oral tablet: 1 tab(s) orally 3 times a day  metFORMIN 500 mg oral tablet: 1 tab(s) orally 2 times a day   metoprolol succinate 50 mg oral tablet, extended release: 1 tab(s) orally once a day

## 2022-11-18 NOTE — DISCHARGE NOTE PROVIDER - NSDCCPCAREPLAN_GEN_ALL_CORE_FT
PRINCIPAL DISCHARGE DIAGNOSIS  Diagnosis: Hyponatremia  Assessment and Plan of Treatment: Hyponatremia occurs when the concentration of sodium in your blood is abnormally low. Sodium is an electrolyte, and it helps regulate the amount of water that's in and around your cells.  In hyponatremia, one or more factors — ranging from an underlying medical condition to drinking too much water — cause the sodium in your body to become diluted. When this happens, your body's water levels rise, and your cells begin to swell. This swelling can cause many health problems, from mild to life-threatening.  Hyponatremia treatment is aimed at resolving the underlying condition. Depending on the cause of hyponatremia, you may simply need to cut back on how much you drink. You can drink soups, juices, sports drinks, and similar items in lieu of some of your water.      SECONDARY DISCHARGE DIAGNOSES  Diagnosis: Diabetes mellitus  Assessment and Plan of Treatment:     Diagnosis: Hypertension  Assessment and Plan of Treatment: - Please take your blood pressure medication as prescribed. Do not skip doses. If you forgot or you skipped a dose talk to your doctor for advise on when to take the next dose   - Avoid smoking and excessive alcohol intake   - call 911 or report to the emergency department if your blood pressure is very high and you have severe headache, blurry vision, weakness or numbness, slurred speech, vertigo or chest pain    Diagnosis: Smoking  Assessment and Plan of Treatment: People who smoke or use other forms of tobacco are more likely to develop disease and die earlier than are people who don't use tobacco. If you smoke, you may worry about what it's doing to your health. You probably worry, too, about how hard it might be to stop smoking. Nicotine is highly addictive, and to quit smoking — especially without help — can be difficult. In fact, most people don't succeed the first time they try to quit. It may take more than one try, but you can stop smoking. Take that first step: Decide to stop smoking. Set a quit date. And then take advantage of the multitude of resources available to help you successfully quit smoking. Please follow up with your outpatient provider for more information regarding methods of smoking cessation, including, but not limited to: nicotine patches/gum/lozenges, medications like Chantix.

## 2022-11-18 NOTE — DISCHARGE NOTE PROVIDER - ATTENDING DISCHARGE PHYSICAL EXAMINATION:
Physical exam:  GENERAL: NAD, lying in bed comfortably  HEAD:  Atraumatic, Normocephalic  EYES: EOMI, PERRLA, conjunctiva and sclera clear  ENT: Moist mucous membranes  NECK: Supple, No JVD  CHEST/LUNG: Clear to auscultation bilaterally; No rales, rhonchi, wheezing, or rubs.  HEART: Regular rate and rhythm; S1+ S2+   ABDOMEN: Bowel sounds present; Soft, Nontender, Nondistended   EXTREMITIES:  2+ Peripheral Pulses, brisk capillary refill. No clubbing, cyanosis, or edema  NERVOUS SYSTEM:  Alert & Oriented   MSK: FROM all 4 extremities, full and equal strength  SKIN: No rashes or lesions    Pt pw generalized weakness and admitted with hyponatremia 114 and htn urgency. Pt was in MICU and was treated with hypertonic saline -> then fluid restriction. Na slowly up trended to 130 today. 11/17 pts home medication of lisinopril was discontinued and started on hydralazine 50 tid  in light of hyponatremia. Will cw fluid restriction at home to 1 L and have pt follow up with outpt provider for repeat labs in 1 week. Pending final endo recs and consideration for oral management as pt is non compliant with insulin therapy .

## 2022-11-18 NOTE — DISCHARGE NOTE PROVIDER - HOSPITAL COURSE
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 Patient was recently admitted to OSH 2 weeks ago with similar presentation and was started on metoprolol, nifedipine and antidiabetics, which patient did not take due to paranoia. Patient has paranoid delusions regarding poison being put in her medications. Psych was consulted however patient refused to see them. For HTN, she was started on lisinopril in addition to home metoprolol; once on RMF lisinopril was discontinued and patient's BP improved with hydralazine. For patient's hypoosmolar euvolemic hyponatremia, sodium improved with fluid restriction and hypertonic saline, remaining stable on RMF. Patient incidentally found to have COVID, previously had intermittent cough which has resolved with supportive care. Patient is medically stable and ready for discharge.     Problem List/Main Diagnoses (system-based):       ·##Hypertension.   ·  Plan: Home med: Lisinopril 10 mg PO daily, nifedipine and metoprolol. Patient reported that she was not taking antihypertensives at home due to paranoia. Lisinopril increased to 40mg qd on CCU. On RMF, d/c lisinopril 40mg, started hydralazine 50mg TID  - c/w metoprolol 50mg qd.  - c/w hydralazine 50mg TID.         ###Hypo-osmolar hyponatremia.   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 Na 114 on admission. s/p DDAVP 0.5 mg PO x1 dose, DDAVP 2 mcg IV x1 dose, 1L NS, 3% saline 50 cc/hr x2 hrs, 1L D5NS.  Na currently 130.  - Education regarding diet supplement and not overhydration.     ###Paranoid delusion.  Patient has delusions about medications and previous medical care. Patient was prescribed medications at OSH 2 weeks ago but did not take them since she was concerned that they were toxic. Patient currently refusing psych consult.   - Follow up with outpatient PCP.       ###Diabetes mellitus.   Patient with history of T2DM. Found to have A1C 11.9%. AM glucose 157. Required 10u on 11/16. BG elevated to 269 and 260 pre-meal.  - ***ENDO RECS HERE***    2019 novel coronavirus disease (COVID-19).   On admission, leukocytosis with left shift noted. Pt has mild intermittent cough but otherwise asymptomatic, on room air.   - Monitor respiratory status.    ###Smoking.   Pt reports 15-20 pack year smoking history (~10 cigarettes/day but this varies) and presents with hyponatremia with concern for SIADH. CXR clear, however pt meets criteria for lung cancer screening  - Consider low-dose CT as outpatient- discuss with PCP.     ###High anion gap metabolic acidosis.   ·  Plan: w/ respiratory compensation. Likely 2/2 starvation ketosis i/s/o decreased PO intake x2 weeks. Resolved.       ### Asymptomatic PVCs.   ·  Plan: PVCs noted on telemetry. Pt asymtomatic, denies CP or SOB. Likely i/s/o metabolic derangement. Currently resolved.      Inpatient treatment course:   New medications: Hydralazine 50mg TID, ENDOCRINE MEDICATIONS  Labs to be followed outpatient: Sodium   Exam to be followed outpatient: Consider outpatient low dose CT.    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 Patient was recently admitted to OSH 2 weeks ago with similar presentation and was started on metoprolol, nifedipine and antidiabetics, which patient did not take due to paranoia. Patient has paranoid delusions regarding poison being put in her medications. Psych was consulted however patient refused to see them. For HTN, she was started on lisinopril in addition to home metoprolol; once on RMF lisinopril was discontinued and patient's BP improved with hydralazine. For patient's hypoosmolar euvolemic hyponatremia, sodium improved with fluid restriction and hypertonic saline, remaining stable on RMF. Patient incidentally found to have COVID, previously had intermittent cough which has resolved with supportive care. Patient is medically stable and ready for discharge.     Problem List/Main Diagnoses (system-based):       ·##Hypertension.   ·  Plan: Home med: Lisinopril 10 mg PO daily, nifedipine and metoprolol. Patient reported that she was not taking antihypertensives at home due to paranoia. Lisinopril increased to 40mg qd on CCU. On RMF, d/c lisinopril 40mg, started hydralazine 50mg TID  - c/w metoprolol 50mg qd.  - c/w hydralazine 50mg TID.         ###Hypo-osmolar hyponatremia.   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 Na 114 on admission. s/p DDAVP 0.5 mg PO x1 dose, DDAVP 2 mcg IV x1 dose, 1L NS, 3% saline 50 cc/hr x2 hrs, 1L D5NS.  Na currently 130.  - Education regarding diet supplement and not overhydration.     ###Paranoid delusion.  Patient has delusions about medications and previous medical care. Patient was prescribed medications at OSH 2 weeks ago but did not take them since she was concerned that they were toxic. Patient currently refusing psych consult.   - Follow up with outpatient PCP.       ###Diabetes mellitus.   Patient with history of T2DM. Found to have A1C 11.9%. AM glucose 157. Required 10u on 11/16. BG elevated to 269 and 260 pre-meal.  - Glimiperide 1mg  - Metformin 500 BID    2019 novel coronavirus disease (COVID-19).   On admission, leukocytosis with left shift noted. Pt has mild intermittent cough but otherwise asymptomatic, on room air.   - Monitor respiratory status.    ###Smoking.   Pt reports 15-20 pack year smoking history (~10 cigarettes/day but this varies) and presents with hyponatremia with concern for SIADH. CXR clear, however pt meets criteria for lung cancer screening  - Consider low-dose CT as outpatient- discuss with PCP.     ###High anion gap metabolic acidosis.   ·  Plan: w/ respiratory compensation. Likely 2/2 starvation ketosis i/s/o decreased PO intake x2 weeks. Resolved.       ### Asymptomatic PVCs.   ·  Plan: PVCs noted on telemetry. Pt asymtomatic, denies CP or SOB. Likely i/s/o metabolic derangement. Currently resolved.      Inpatient treatment course:   New medications: Hydralazine 50mg TID, - Glimiperide 1mg, - Metformin 500 BID  Labs to be followed outpatient: Sodium with PCP  Exam to be followed outpatient: Consider outpatient low dose CT.

## 2022-11-18 NOTE — DISCHARGE NOTE PROVIDER - NSDCQMPCI_CARD_ALL_CORE
October 16, 2017      Candido Vasquez MD  [address] [address]  Phone: [phone]  Fax: [phone]          Patient: Beulah Robin   MR Number: [id number]   YOB: 2012   Date of Visit: 10/16/2017       Dear Dr. Candido Vasquez:    Thank you for referring Beulah Robin to me for evaluation. Attached you will find relevant portions of my assessment and plan of care.    If you have questions, please do not hesitate to call me. I look forward to following Beulah Robin along with you.    Sincerely,    Juan Antonio Parker MD    Enclosure  CC:  No Recipients    If you would like to receive this communication electronically, please contact [email or web address] or [phone] to request more information on [employer] access.    For providers and/or their staff who would like to refer a patient to [facility], please contact us through our one-stop-shop provider referral line, [facility], at [phone].    If you feel you have received this communication in error or would no longer like to receive these types of communications, please e-mail [email or web address]         
No

## 2022-11-18 NOTE — DISCHARGE NOTE PROVIDER - NSFOLLOWUPCLINICS_GEN_ALL_ED_FT
[facility]  Family Medicine  [address]  [place], [place] [address]  Phone: [phone]  Fax:

## 2022-11-18 NOTE — PROGRESS NOTE ADULT - ATTENDING COMMENTS
Pt seen on rounds this afternoon prior to anticipated discharge.  Seems cognitively somewhat clearer, likely due to correction of hyponatremia--(serum Na is up to 130)  No edema on exam.  Glucoses were in the low 200s yesterday evening (though she did not receive the standing lispro before supper), and 155 this morning (after 15 Lantus plus coverage)  Will discharge on a combination of metformin (500 mg BID plus glimepiride  She understands what happened with her excessive water intake and she indicates (convincingly) that she will avoid this in the future

## 2022-11-18 NOTE — PROGRESS NOTE ADULT - SUBJECTIVE AND OBJECTIVE BOX
REVIEW OF SYSTEMS:      MEDICATIONS  (STANDING):  chlorhexidine 4% Liquid 1 Application(s) Topical <User Schedule>  dextrose 5%. 1000 milliLiter(s) (100 mL/Hr) IV Continuous <Continuous>  dextrose 5%. 1000 milliLiter(s) (50 mL/Hr) IV Continuous <Continuous>  dextrose 50% Injectable 25 Gram(s) IV Push once  dextrose 50% Injectable 12.5 Gram(s) IV Push once  dextrose 50% Injectable 25 Gram(s) IV Push once  enoxaparin Injectable 40 milliGRAM(s) SubCutaneous every 12 hours  glucagon  Injectable 1 milliGRAM(s) IntraMuscular once  hydrALAZINE 50 milliGRAM(s) Oral three times a day  influenza   Vaccine 0.5 milliLiter(s) IntraMuscular once  insulin glargine Injectable (LANTUS) 15 Unit(s) SubCutaneous at bedtime  insulin lispro (ADMELOG) corrective regimen sliding scale   SubCutaneous Before meals and at bedtime  insulin lispro Injectable (ADMELOG) 4 Unit(s) SubCutaneous three times a day before meals  metoprolol succinate ER 50 milliGRAM(s) Oral daily    MEDICATIONS  (PRN):  dextrose Oral Gel 15 Gram(s) Oral once PRN Blood Glucose LESS THAN 70 milliGRAM(s)/deciliter    Vital Signs Last 24 Hrs  T(C): 36.9 (18 Nov 2022 05:23), Max: 37.1 (17 Nov 2022 21:38)  T(F): 98.5 (18 Nov 2022 05:23), Max: 98.7 (17 Nov 2022 21:38)  HR: 68 (18 Nov 2022 05:23) (60 - 73)  BP: 137/80 (18 Nov 2022 05:23) (119/62 - 225/100)  BP(mean): 86 (17 Nov 2022 17:00) (84 - 143)  RR: 19 (18 Nov 2022 05:23) (14 - 28)  SpO2: 98% (18 Nov 2022 05:23) (95% - 100%)    Parameters below as of 18 Nov 2022 05:23  Patient On (Oxygen Delivery Method): room air    LABS  11-18    130<L>  |  95<L>  |  16  ----------------------------<  176<H>  3.8   |  26  |  0.81    Ca    8.7      18 Nov 2022 05:30  Phos  2.9     11-18  Mg     2.0     11-18    TPro  6.6  /  Alb  3.2<L>  /  TBili  0.5  /  DBili  x   /  AST  11  /  ALT  9<L>  /  AlkPhos  56  11-17    I&O's Detail    17 Nov 2022 07:01  -  18 Nov 2022 07:00  --------------------------------------------------------  IN:    Oral Fluid: 50 mL  Total IN: 50 mL    OUT:    Voided (mL): 700 mL  Total OUT: 700 mL    Total NET: -650 mL      Historical Values  Sodium, Serum: 130 mmol/L (11.18.22 @ 05:30)   Sodium, Serum: 129 mmol/L (11.17.22 @ 14:04)   Sodium, Serum: 128 mmol/L (11.17.22 @ 03:38)   Sodium, Serum: 125 mmol/L (11.16.22 @ 20:48)   Sodium, Serum: 126 mmol/L (11.16.22 @ 15:59)   Sodium, Serum: 124 mmol/L (11.16.22 @ 14:03)   Sodium, Serum: 121 mmol/L (11.16.22 @ 12:39)     Historical Values  Osmolality, Random Urine: 296 mosm/kg (11.17.22 @ 14:04)   Osmolality, Random Urine: 371 mosm/kg (11.17.22 @ 03:38)   Osmolality, Random Urine: 450 mosm/kg (11.16.22 @ 20:48)   Osmolality, Random Urine: 392 mosm/kg (11.16.22 @ 09:29)   Osmolality, Random Urine: 489 mosm/kg (11.15.22 @ 21:52)   Osmolality, Random Urine: 386 mosm/kg (11.15.22 @ 10:53)   Osmolality, Random Urine: 123 mosm/kg (11.15.22 @ 01:49)   Osmolality, Random Urine: 135 mosm/kg (11.14.22 @ 21:34)    Patient was seen and examined at bedside this morning. She is doing well and offers no significant complaints at this time. Patient is eating and drinking appropriately.       REVIEW OF SYSTEMS:  Negative unless otherwise noted in HPI      MEDICATIONS  (STANDING):  chlorhexidine 4% Liquid 1 Application(s) Topical <User Schedule>  dextrose 5%. 1000 milliLiter(s) (100 mL/Hr) IV Continuous <Continuous>  dextrose 5%. 1000 milliLiter(s) (50 mL/Hr) IV Continuous <Continuous>  dextrose 50% Injectable 25 Gram(s) IV Push once  dextrose 50% Injectable 12.5 Gram(s) IV Push once  dextrose 50% Injectable 25 Gram(s) IV Push once  enoxaparin Injectable 40 milliGRAM(s) SubCutaneous every 12 hours  glucagon  Injectable 1 milliGRAM(s) IntraMuscular once  hydrALAZINE 50 milliGRAM(s) Oral three times a day  influenza   Vaccine 0.5 milliLiter(s) IntraMuscular once  insulin glargine Injectable (LANTUS) 15 Unit(s) SubCutaneous at bedtime  insulin lispro (ADMELOG) corrective regimen sliding scale   SubCutaneous Before meals and at bedtime  insulin lispro Injectable (ADMELOG) 4 Unit(s) SubCutaneous three times a day before meals  metoprolol succinate ER 50 milliGRAM(s) Oral daily    MEDICATIONS  (PRN):  dextrose Oral Gel 15 Gram(s) Oral once PRN Blood Glucose LESS THAN 70 milliGRAM(s)/deciliter    Vital Signs Last 24 Hrs  T(C): 36.9 (18 Nov 2022 05:23), Max: 37.1 (17 Nov 2022 21:38)  T(F): 98.5 (18 Nov 2022 05:23), Max: 98.7 (17 Nov 2022 21:38)  HR: 68 (18 Nov 2022 05:23) (60 - 73)  BP: 137/80 (18 Nov 2022 05:23) (119/62 - 225/100)  BP(mean): 86 (17 Nov 2022 17:00) (84 - 143)  RR: 19 (18 Nov 2022 05:23) (14 - 28)  SpO2: 98% (18 Nov 2022 05:23) (95% - 100%)    Parameters below as of 18 Nov 2022 05:23  Patient On (Oxygen Delivery Method): room air    LABS  11-18    130<L>  |  95<L>  |  16  ----------------------------<  176<H>  3.8   |  26  |  0.81    Ca    8.7      18 Nov 2022 05:30  Phos  2.9     11-18  Mg     2.0     11-18    TPro  6.6  /  Alb  3.2<L>  /  TBili  0.5  /  DBili  x   /  AST  11  /  ALT  9<L>  /  AlkPhos  56  11-17    I&O's Detail    17 Nov 2022 07:01  -  18 Nov 2022 07:00  --------------------------------------------------------  IN:    Oral Fluid: 50 mL  Total IN: 50 mL    OUT:    Voided (mL): 700 mL  Total OUT: 700 mL    Total NET: -650 mL        Historical Values  Sodium, Serum: 130 mmol/L (11.18.22 @ 05:30)   Sodium, Serum: 129 mmol/L (11.17.22 @ 14:04)   Sodium, Serum: 128 mmol/L (11.17.22 @ 03:38)   Sodium, Serum: 125 mmol/L (11.16.22 @ 20:48)   Sodium, Serum: 126 mmol/L (11.16.22 @ 15:59)   Sodium, Serum: 124 mmol/L (11.16.22 @ 14:03)   Sodium, Serum: 121 mmol/L (11.16.22 @ 12:39)     Historical Values  Osmolality, Random Urine: 296 mosm/kg (11.17.22 @ 14:04)   Osmolality, Random Urine: 371 mosm/kg (11.17.22 @ 03:38)   Osmolality, Random Urine: 450 mosm/kg (11.16.22 @ 20:48)   Osmolality, Random Urine: 392 mosm/kg (11.16.22 @ 09:29)   Osmolality, Random Urine: 489 mosm/kg (11.15.22 @ 21:52)   Osmolality, Random Urine: 386 mosm/kg (11.15.22 @ 10:53)   Osmolality, Random Urine: 123 mosm/kg (11.15.22 @ 01:49)   Osmolality, Random Urine: 135 mosm/kg (11.14.22 @ 21:34)       PHYSICAL EXAM:  GENERAL: Alert, awake, oriented x3  HEENT: OCTAVIO, EOMI, neck supple, no JVP  CHEST/LUNG: Bilateral clear breath sounds  HEART: Regular rate and rhythm, no murmur, no gallops, no rub   ABDOMEN: Soft, nontender, non distended  EXTREMITIES: no pedal edema  Neurology: AAOx3, no focal neurological deficit  SKIN: No rash or skin lesion

## 2022-11-18 NOTE — PROGRESS NOTE ADULT - ASSESSMENT
62F with PMHx of HTN, DM, hypothyroidism and breast cancer who presented to the hospital in the setting of lower extremity weakness. Noted to be hyponatremic in setting of possible SIADH vs primary polydipsia.     Assessment/Plan:   #Chronic euvolemic ?asymptomatic hyponatremia  Pt endorsing initial weakness, possibly from the hyponatremia. Endorsing better as sodium has improved. No overt cause identified for SIADH, does not endorse being on any meds, that could cause or pain, nausea or vomiting. There could be a component of primary polydipsia given her urine osm was low when initially presenting. Possibly tea and toast.    -Goal serum Na+ 134 for today, currently at 130  -Urine osmolality 296, cannot rule out SIADH  -Recommend fluid restriction < 1L/24h    62F with PMHx of HTN, DM, hypothyroidism and breast cancer who presented to the hospital in the setting of lower extremity weakness. Noted to be hyponatremic in setting of possible SIADH vs primary polydipsia.     Assessment/Plan:   #Chronic euvolemic ?asymptomatic hyponatremia  Pt endorsing initial weakness, possibly from the hyponatremia. Endorsing better as sodium has improved.   Urine studies suggestive more so of an SIADH picture at this time, although initial labs suggestive of a primary polydipsia like picture.   -Goal serum Na+ 136-138 by 11/19 6 AM; sodium currently at 130  -Recommend fluid restriction < 1L/24h   -Thyroid studies normal  -Trend BMP BID

## 2022-11-25 DIAGNOSIS — E83.42 HYPOMAGNESEMIA: ICD-10-CM

## 2022-11-25 DIAGNOSIS — E83.39 OTHER DISORDERS OF PHOSPHORUS METABOLISM: ICD-10-CM

## 2022-11-25 DIAGNOSIS — Z87.19 PERSONAL HISTORY OF OTHER DISEASES OF THE DIGESTIVE SYSTEM: ICD-10-CM

## 2022-11-25 DIAGNOSIS — R63.1 POLYDIPSIA: ICD-10-CM

## 2022-11-25 DIAGNOSIS — E87.6 HYPOKALEMIA: ICD-10-CM

## 2022-11-25 DIAGNOSIS — E87.1 HYPO-OSMOLALITY AND HYPONATREMIA: ICD-10-CM

## 2022-11-25 DIAGNOSIS — Z53.20 PROCEDURE AND TREATMENT NOT CARRIED OUT BECAUSE OF PATIENT'S DECISION FOR UNSPECIFIED REASONS: ICD-10-CM

## 2022-11-25 DIAGNOSIS — E87.3 ALKALOSIS: ICD-10-CM

## 2022-11-25 DIAGNOSIS — F29 UNSPECIFIED PSYCHOSIS NOT DUE TO A SUBSTANCE OR KNOWN PHYSIOLOGICAL CONDITION: ICD-10-CM

## 2022-11-25 DIAGNOSIS — E66.9 OBESITY, UNSPECIFIED: ICD-10-CM

## 2022-11-25 DIAGNOSIS — F17.210 NICOTINE DEPENDENCE, CIGARETTES, UNCOMPLICATED: ICD-10-CM

## 2022-11-25 DIAGNOSIS — E87.29 OTHER ACIDOSIS: ICD-10-CM

## 2022-11-25 DIAGNOSIS — R63.8 OTHER SYMPTOMS AND SIGNS CONCERNING FOOD AND FLUID INTAKE: ICD-10-CM

## 2022-11-25 DIAGNOSIS — I16.0 HYPERTENSIVE URGENCY: ICD-10-CM

## 2022-11-25 DIAGNOSIS — Z85.3 PERSONAL HISTORY OF MALIGNANT NEOPLASM OF BREAST: ICD-10-CM

## 2022-11-25 DIAGNOSIS — E03.9 HYPOTHYROIDISM, UNSPECIFIED: ICD-10-CM

## 2022-11-25 DIAGNOSIS — F20.9 SCHIZOPHRENIA, UNSPECIFIED: ICD-10-CM

## 2022-11-25 DIAGNOSIS — E51.9 THIAMINE DEFICIENCY, UNSPECIFIED: ICD-10-CM

## 2022-11-25 DIAGNOSIS — Z91.14 PATIENT'S OTHER NONCOMPLIANCE WITH MEDICATION REGIMEN: ICD-10-CM

## 2022-11-25 DIAGNOSIS — I10 ESSENTIAL (PRIMARY) HYPERTENSION: ICD-10-CM

## 2022-11-25 DIAGNOSIS — Z99.3 DEPENDENCE ON WHEELCHAIR: ICD-10-CM

## 2022-11-25 DIAGNOSIS — U07.1 COVID-19: ICD-10-CM

## 2022-11-25 DIAGNOSIS — R53.1 WEAKNESS: ICD-10-CM

## 2022-11-25 DIAGNOSIS — E11.65 TYPE 2 DIABETES MELLITUS WITH HYPERGLYCEMIA: ICD-10-CM

## 2022-11-25 DIAGNOSIS — I49.3 VENTRICULAR PREMATURE DEPOLARIZATION: ICD-10-CM

## 2022-12-23 NOTE — DIETITIAN INITIAL EVALUATION ADULT - OTHER INFO
63 yo F w/ PMH HTN, DM, hypothyroidism, and breast cancer who presents for 2 weeks of lower extremity weakness, found to have Na 114, admitted to MICU for treatment of severe hyponatremia.    Pt assessed at bedside. Resting in bed, dinner at bedside. Pt started on consistent carbohydrate diet with fluid restriction. Diet tolerated well no N/V/D/C. PO intake variable, however overall ~75% meals and likely meeting estimated needs. Pt undergoing electrolyte repletion for severe hyponatremia 114 upon admission. K, Phos, and Mag also repleted. Per nephro note possible SIADH vs primary polydipsia. Reviewed with pt adequate intake parameters, need for fluid restriction to correct Na. Also reviewed need for micronutrient supplementation due to suspected inadequate intake and history. RD to continue to follow and provide education as needed. No pain noted. Yaya score 17. Nutrition recommendations below.  
yes...

## 2023-02-12 ENCOUNTER — INPATIENT (INPATIENT)
Facility: HOSPITAL | Age: 63
LOS: 10 days | Discharge: NURSING, REHAB OR LONG-TERM CARE | DRG: 312 | End: 2023-02-23
Attending: INTERNAL MEDICINE | Admitting: INTERNAL MEDICINE
Payer: COMMERCIAL

## 2023-02-12 VITALS
HEART RATE: 77 BPM | DIASTOLIC BLOOD PRESSURE: 79 MMHG | SYSTOLIC BLOOD PRESSURE: 111 MMHG | RESPIRATION RATE: 20 BRPM | OXYGEN SATURATION: 99 % | WEIGHT: 149.91 LBS | TEMPERATURE: 98 F

## 2023-02-12 DIAGNOSIS — I48.0 PAROXYSMAL ATRIAL FIBRILLATION: ICD-10-CM

## 2023-02-12 DIAGNOSIS — I10 ESSENTIAL (PRIMARY) HYPERTENSION: ICD-10-CM

## 2023-02-12 DIAGNOSIS — E03.9 HYPOTHYROIDISM, UNSPECIFIED: ICD-10-CM
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
## 2023-02-12 DIAGNOSIS — E78.5 HYPERLIPIDEMIA, UNSPECIFIED: ICD-10-CM

## 2023-02-12 DIAGNOSIS — E87.8 OTHER DISORDERS OF ELECTROLYTE AND FLUID BALANCE, NOT ELSEWHERE CLASSIFIED: ICD-10-CM

## 2023-02-12 DIAGNOSIS — F22 DELUSIONAL DISORDERS: ICD-10-CM

## 2023-02-12 DIAGNOSIS — E11.9 TYPE 2 DIABETES MELLITUS WITHOUT COMPLICATIONS: ICD-10-CM

## 2023-02-12 LAB
ALBUMIN SERPL ELPH-MCNC: 2.8 G/DL — LOW (ref 3.3–5)
ALBUMIN SERPL ELPH-MCNC: 3.7 G/DL — SIGNIFICANT CHANGE UP (ref 3.3–5)
ALP SERPL-CCNC: 71 U/L — SIGNIFICANT CHANGE UP (ref 40–120)
ALP SERPL-CCNC: 89 U/L — SIGNIFICANT CHANGE UP (ref 40–120)
ALT FLD-CCNC: 6 U/L — LOW (ref 10–45)
ALT FLD-CCNC: 7 U/L — LOW (ref 10–45)
ANION GAP SERPL CALC-SCNC: 12 MMOL/L — SIGNIFICANT CHANGE UP (ref 5–17)
ANION GAP SERPL CALC-SCNC: 14 MMOL/L — SIGNIFICANT CHANGE UP (ref 5–17)
ANION GAP SERPL CALC-SCNC: 22 MMOL/L — HIGH (ref 5–17)
APPEARANCE UR: CLEAR — SIGNIFICANT CHANGE UP
APTT BLD: 32.4 SEC — SIGNIFICANT CHANGE UP (ref 27.5–35.5)
AST SERPL-CCNC: 12 U/L — SIGNIFICANT CHANGE UP (ref 10–40)
AST SERPL-CCNC: 9 U/L — LOW (ref 10–40)
BACTERIA # UR AUTO: PRESENT /HPF
BILIRUB SERPL-MCNC: 0.5 MG/DL — SIGNIFICANT CHANGE UP (ref 0.2–1.2)
BILIRUB SERPL-MCNC: 0.8 MG/DL — SIGNIFICANT CHANGE UP (ref 0.2–1.2)
BILIRUB UR-MCNC: NEGATIVE — SIGNIFICANT CHANGE UP
BUN SERPL-MCNC: 5 MG/DL — LOW (ref 7–23)
BUN SERPL-MCNC: 6 MG/DL — LOW (ref 7–23)
BUN SERPL-MCNC: 6 MG/DL — LOW (ref 7–23)
CALCIUM SERPL-MCNC: 8.4 MG/DL — SIGNIFICANT CHANGE UP (ref 8.4–10.5)
CALCIUM SERPL-MCNC: 8.6 MG/DL — SIGNIFICANT CHANGE UP (ref 8.4–10.5)
CALCIUM SERPL-MCNC: 9.2 MG/DL — SIGNIFICANT CHANGE UP (ref 8.4–10.5)
CHLORIDE SERPL-SCNC: 80 MMOL/L — LOW (ref 96–108)
CHLORIDE SERPL-SCNC: 87 MMOL/L — LOW (ref 96–108)
CHLORIDE SERPL-SCNC: 88 MMOL/L — LOW (ref 96–108)
CO2 SERPL-SCNC: 26 MMOL/L — SIGNIFICANT CHANGE UP (ref 22–31)
CO2 SERPL-SCNC: 28 MMOL/L — SIGNIFICANT CHANGE UP (ref 22–31)
CO2 SERPL-SCNC: 31 MMOL/L — SIGNIFICANT CHANGE UP (ref 22–31)
COLOR SPEC: YELLOW — SIGNIFICANT CHANGE UP
CREAT SERPL-MCNC: 0.73 MG/DL — SIGNIFICANT CHANGE UP (ref 0.5–1.3)
CREAT SERPL-MCNC: 0.77 MG/DL — SIGNIFICANT CHANGE UP (ref 0.5–1.3)
CREAT SERPL-MCNC: 0.88 MG/DL — SIGNIFICANT CHANGE UP (ref 0.5–1.3)
DIFF PNL FLD: ABNORMAL
EGFR: 74 ML/MIN/1.73M2 — SIGNIFICANT CHANGE UP
EGFR: 87 ML/MIN/1.73M2 — SIGNIFICANT CHANGE UP
EGFR: 92 ML/MIN/1.73M2 — SIGNIFICANT CHANGE UP
EPI CELLS # UR: SIGNIFICANT CHANGE UP /HPF (ref 0–5)
GLUCOSE BLDC GLUCOMTR-MCNC: 129 MG/DL — HIGH (ref 70–99)
GLUCOSE BLDC GLUCOMTR-MCNC: 150 MG/DL — HIGH (ref 70–99)
GLUCOSE SERPL-MCNC: 144 MG/DL — HIGH (ref 70–99)
GLUCOSE SERPL-MCNC: 157 MG/DL — HIGH (ref 70–99)
GLUCOSE SERPL-MCNC: 188 MG/DL — HIGH (ref 70–99)
GLUCOSE UR QL: NEGATIVE — SIGNIFICANT CHANGE UP
HCT VFR BLD CALC: 41.8 % — SIGNIFICANT CHANGE UP (ref 34.5–45)
HGB BLD-MCNC: 14.4 G/DL — SIGNIFICANT CHANGE UP (ref 11.5–15.5)
INR BLD: 1.15 — SIGNIFICANT CHANGE UP (ref 0.88–1.16)
KETONES UR-MCNC: 40 MG/DL
LEUKOCYTE ESTERASE UR-ACNC: NEGATIVE — SIGNIFICANT CHANGE UP
MAGNESIUM SERPL-MCNC: 1.6 MG/DL — SIGNIFICANT CHANGE UP (ref 1.6–2.6)
MAGNESIUM SERPL-MCNC: 2.1 MG/DL — SIGNIFICANT CHANGE UP (ref 1.6–2.6)
MCHC RBC-ENTMCNC: 27.7 PG — SIGNIFICANT CHANGE UP (ref 27–34)
MCHC RBC-ENTMCNC: 34.4 GM/DL — SIGNIFICANT CHANGE UP (ref 32–36)
MCV RBC AUTO: 80.4 FL — SIGNIFICANT CHANGE UP (ref 80–100)
NITRITE UR-MCNC: NEGATIVE — SIGNIFICANT CHANGE UP
NRBC # BLD: 0 /100 WBCS — SIGNIFICANT CHANGE UP (ref 0–0)
PH UR: 7 — SIGNIFICANT CHANGE UP (ref 5–8)
PLATELET # BLD AUTO: 575 K/UL — HIGH (ref 150–400)
POTASSIUM SERPL-MCNC: 2.2 MMOL/L — CRITICAL LOW (ref 3.5–5.3)
POTASSIUM SERPL-MCNC: 2.6 MMOL/L — CRITICAL LOW (ref 3.5–5.3)
POTASSIUM SERPL-MCNC: SIGNIFICANT CHANGE UP (ref 3.5–5.3)
POTASSIUM SERPL-SCNC: 2.2 MMOL/L — CRITICAL LOW (ref 3.5–5.3)
POTASSIUM SERPL-SCNC: 2.6 MMOL/L — CRITICAL LOW (ref 3.5–5.3)
POTASSIUM SERPL-SCNC: SIGNIFICANT CHANGE UP (ref 3.5–5.3)
PROT SERPL-MCNC: 6.7 G/DL — SIGNIFICANT CHANGE UP (ref 6–8.3)
PROT SERPL-MCNC: 8.2 G/DL — SIGNIFICANT CHANGE UP (ref 6–8.3)
PROT UR-MCNC: 30 MG/DL
PROTHROM AB SERPL-ACNC: 13.7 SEC — HIGH (ref 10.5–13.4)
RBC # BLD: 5.2 M/UL — SIGNIFICANT CHANGE UP (ref 3.8–5.2)
RBC # FLD: 13.8 % — SIGNIFICANT CHANGE UP (ref 10.3–14.5)
RBC CASTS # UR COMP ASSIST: < 5 /HPF — SIGNIFICANT CHANGE UP
SARS-COV-2 RNA SPEC QL NAA+PROBE: NEGATIVE — SIGNIFICANT CHANGE UP
SODIUM SERPL-SCNC: 128 MMOL/L — LOW (ref 135–145)
SODIUM SERPL-SCNC: 130 MMOL/L — LOW (ref 135–145)
SODIUM SERPL-SCNC: 130 MMOL/L — LOW (ref 135–145)
SP GR SPEC: 1.01 — SIGNIFICANT CHANGE UP (ref 1–1.03)
TROPONIN T SERPL-MCNC: 0.05 NG/ML — CRITICAL HIGH (ref 0–0.01)
TSH SERPL-MCNC: 2.07 UIU/ML — SIGNIFICANT CHANGE UP (ref 0.27–4.2)
UROBILINOGEN FLD QL: 0.2 E.U./DL — SIGNIFICANT CHANGE UP
WBC # BLD: 14.53 K/UL — HIGH (ref 3.8–10.5)
WBC # FLD AUTO: 14.53 K/UL — HIGH (ref 3.8–10.5)
WBC UR QL: < 5 /HPF — SIGNIFICANT CHANGE UP

## 2023-02-12 PROCEDURE — 71045 X-RAY EXAM CHEST 1 VIEW: CPT | Mod: 26

## 2023-02-12 PROCEDURE — 99285 EMERGENCY DEPT VISIT HI MDM: CPT

## 2023-02-12 PROCEDURE — 70450 CT HEAD/BRAIN W/O DYE: CPT | Mod: 26,MA

## 2023-02-12 PROCEDURE — 72170 X-RAY EXAM OF PELVIS: CPT | Mod: 26

## 2023-02-12 RX ORDER — HEPARIN SODIUM 5000 [USP'U]/ML
INJECTION INTRAVENOUS; SUBCUTANEOUS
Qty: 25000 | Refills: 0 | Status: DISCONTINUED | OUTPATIENT
Start: 2023-02-12 | End: 2023-02-13

## 2023-02-12 RX ORDER — INSULIN LISPRO 100/ML
VIAL (ML) SUBCUTANEOUS
Refills: 0 | Status: DISCONTINUED | OUTPATIENT
Start: 2023-02-12 | End: 2023-02-23

## 2023-02-12 RX ORDER — HEPARIN SODIUM 5000 [USP'U]/ML
2500 INJECTION INTRAVENOUS; SUBCUTANEOUS EVERY 6 HOURS
Refills: 0 | Status: DISCONTINUED | OUTPATIENT
Start: 2023-02-12 | End: 2023-02-12

## 2023-02-12 RX ORDER — HYDRALAZINE HCL 50 MG
50 TABLET ORAL ONCE
Refills: 0 | Status: COMPLETED | OUTPATIENT
Start: 2023-02-12 | End: 2023-02-12

## 2023-02-12 RX ORDER — DEXTROSE 50 % IN WATER 50 %
12.5 SYRINGE (ML) INTRAVENOUS ONCE
Refills: 0 | Status: DISCONTINUED | OUTPATIENT
Start: 2023-02-12 | End: 2023-02-15

## 2023-02-12 RX ORDER — DEXTROSE 50 % IN WATER 50 %
25 SYRINGE (ML) INTRAVENOUS ONCE
Refills: 0 | Status: DISCONTINUED | OUTPATIENT
Start: 2023-02-12 | End: 2023-02-15

## 2023-02-12 RX ORDER — HEPARIN SODIUM 5000 [USP'U]/ML
5500 INJECTION INTRAVENOUS; SUBCUTANEOUS EVERY 6 HOURS
Refills: 0 | Status: DISCONTINUED | OUTPATIENT
Start: 2023-02-12 | End: 2023-02-12

## 2023-02-12 RX ORDER — ASPIRIN/CALCIUM CARB/MAGNESIUM 324 MG
324 TABLET ORAL ONCE
Refills: 0 | Status: COMPLETED | OUTPATIENT
Start: 2023-02-12 | End: 2023-02-12

## 2023-02-12 RX ORDER — POTASSIUM CHLORIDE 20 MEQ
10 PACKET (EA) ORAL ONCE
Refills: 0 | Status: COMPLETED | OUTPATIENT
Start: 2023-02-12 | End: 2023-02-12

## 2023-02-12 RX ORDER — POTASSIUM CHLORIDE 20 MEQ
40 PACKET (EA) ORAL EVERY 4 HOURS
Refills: 0 | Status: COMPLETED | OUTPATIENT
Start: 2023-02-12 | End: 2023-02-13

## 2023-02-12 RX ORDER — SODIUM CHLORIDE 9 MG/ML
1000 INJECTION, SOLUTION INTRAVENOUS
Refills: 0 | Status: DISCONTINUED | OUTPATIENT
Start: 2023-02-12 | End: 2023-02-15

## 2023-02-12 RX ORDER — MAGNESIUM SULFATE 500 MG/ML
2 VIAL (ML) INJECTION ONCE
Refills: 0 | Status: COMPLETED | OUTPATIENT
Start: 2023-02-12 | End: 2023-02-12

## 2023-02-12 RX ORDER — POTASSIUM CHLORIDE 20 MEQ
60 PACKET (EA) ORAL ONCE
Refills: 0 | Status: COMPLETED | OUTPATIENT
Start: 2023-02-12 | End: 2023-02-12

## 2023-02-12 RX ORDER — GLUCAGON INJECTION, SOLUTION 0.5 MG/.1ML
1 INJECTION, SOLUTION SUBCUTANEOUS ONCE
Refills: 0 | Status: DISCONTINUED | OUTPATIENT
Start: 2023-02-12 | End: 2023-02-16

## 2023-02-12 RX ORDER — DEXTROSE 50 % IN WATER 50 %
15 SYRINGE (ML) INTRAVENOUS ONCE
Refills: 0 | Status: DISCONTINUED | OUTPATIENT
Start: 2023-02-12 | End: 2023-02-23

## 2023-02-12 RX ORDER — METOPROLOL TARTRATE 50 MG
50 TABLET ORAL ONCE
Refills: 0 | Status: COMPLETED | OUTPATIENT
Start: 2023-02-12 | End: 2023-02-12

## 2023-02-12 RX ORDER — INSULIN LISPRO 100/ML
VIAL (ML) SUBCUTANEOUS AT BEDTIME
Refills: 0 | Status: DISCONTINUED | OUTPATIENT
Start: 2023-02-12 | End: 2023-02-23

## 2023-02-12 RX ORDER — SODIUM CHLORIDE 9 MG/ML
1000 INJECTION, SOLUTION INTRAVENOUS
Refills: 0 | Status: DISCONTINUED | OUTPATIENT
Start: 2023-02-12 | End: 2023-02-12

## 2023-02-12 RX ADMIN — Medication 60 MILLIEQUIVALENT(S): at 13:25

## 2023-02-12 RX ADMIN — SODIUM CHLORIDE 250 MILLILITER(S): 9 INJECTION, SOLUTION INTRAVENOUS at 13:20

## 2023-02-12 RX ADMIN — Medication 100 MILLIEQUIVALENT(S): at 13:34

## 2023-02-12 RX ADMIN — SODIUM CHLORIDE 100 MILLILITER(S): 9 INJECTION, SOLUTION INTRAVENOUS at 19:55

## 2023-02-12 RX ADMIN — HEPARIN SODIUM 1200 UNIT(S)/HR: 5000 INJECTION INTRAVENOUS; SUBCUTANEOUS at 21:57

## 2023-02-12 RX ADMIN — Medication 2 GRAM(S): at 14:50

## 2023-02-12 RX ADMIN — Medication 40 MILLIEQUIVALENT(S): at 19:55

## 2023-02-12 RX ADMIN — Medication 50 MILLIGRAM(S): at 19:36

## 2023-02-12 RX ADMIN — Medication 25 GRAM(S): at 13:19

## 2023-02-12 RX ADMIN — Medication 40 MILLIEQUIVALENT(S): at 22:20

## 2023-02-12 RX ADMIN — Medication 50 MILLIGRAM(S): at 12:52

## 2023-02-12 RX ADMIN — Medication 10 MILLIEQUIVALENT(S): at 14:50

## 2023-02-12 RX ADMIN — Medication 324 MILLIGRAM(S): at 14:55

## 2023-02-12 RX ADMIN — HEPARIN SODIUM 1200 UNIT(S)/HR: 5000 INJECTION INTRAVENOUS; SUBCUTANEOUS at 21:17

## 2023-02-12 NOTE — H&P ADULT - NSHPADDITIONALINFOADULT_GEN_ALL_CORE
Attending Attestation:  I was physically present for the key portions of the evaluation and management (E/M) service provided.  I agree with the above history, physical, and plan which I have reviewed with the following edits/addendum:    62F former smoker, hx of breast CA, HTN, HLP, DMT2, hypothyroidism, hypoosm hyponatremia who presented w an episode of lightheadedness, imbalance and severe weakness causing a fall. She denies LOC. She has not been taking any medications due to paranoia about healthcare. She did report not having been eating and drinking enough recently. 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   - initial labs remarkable for leukocytosis 13.2, hypokalemia 2.6, hyponatremia 128, hypoalbuminemia 2.8  - given 1 L IVF in ED and correction for electrolyte derangements  - trop T mildly elevated 0.06, flat and suspected syncope led to admission to telemetry  - ECG nonischemic, TTE 2/13/23 normal biV function  - CCTA:    Mynor Roberto MD  Cardiology    50 minutes spent on total encounter; more than 50% of the visit was spent counseling and/or coordinating care by the attending physician. Attending Attestation:  I was physically present for the key portions of the evaluation and management (E/M) service provided.  I agree with the above history, physical, and plan which I have reviewed with the following edits/addendum:    62F former smoker, hx of breast CA, HTN, HLP, DMT2, hypothyroidism, hypoosm hyponatremia who presented w an episode of lightheadedness, imbalance and severe weakness causing a fall. She denies LOC. She has not been taking any medications due to paranoia about healthcare. She did report not having been eating and drinking enough recently. 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   - initial labs remarkable for leukocytosis 13.2, hypokalemia 2.6, hyponatremia 128, hypoalbuminemia 2.8  - given 1 L IVF in ED and correction for electrolyte derangements  - trop T mildly elevated 0.06, flat and suspected syncope led to admission to telemetry  - ECG nonischemic, TTE 2/13/23 normal biV function  - Parox Afib: d/w pt r/b of AC. rec to start apixaban for stroke prevention    Mynor Roberto MD  Cardiology    50 minutes spent on total encounter; more than 50% of the visit was spent counseling and/or coordinating care by the attending physician. Attending Attestation:  I was physically present for the key portions of the evaluation and management (E/M) service provided.  I agree with the above history, physical, and plan which I have reviewed with the following edits/addendum:    62F former smoker, hx of breast CA, HTN, HLP, DMT2, hypothyroidism, hypoosm hyponatremia who presented w an episode of lightheadedness, imbalance and severe weakness causing a fall. She denies LOC. She has not been taking any medications due to paranoia about healthcare. She did report not having been eating and drinking enough recently. 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   - initial labs remarkable for leukocytosis 13.2, hypokalemia 2.6, hyponatremia 128, hypoalbuminemia 2.8  - given 1 L IVF in ED and correction for electrolyte derangements  - trop T mildly elevated 0.06, flat and suspected syncope led to admission to telemetry  - ECG nonischemic w wandering pacer, TTE 2/13/23 normal biV function  - Parox Afib: d/w pt r/b of AC. rec to start apixaban for stroke prevention    Mynor Roberto MD  Cardiology    50 minutes spent on total encounter; more than 50% of the visit was spent counseling and/or coordinating care by the attending physician.  initial encounter date 2/13/23 Attending Attestation:  I was physically present for the key portions of the evaluation and management (E/M) service provided.  I agree with the above history, physical, and plan which I have reviewed with the following edits/addendum:    62F former smoker, hx of breast CA, HTN, HLP, DMT2, hypothyroidism, hypoosm hyponatremia who presented w an episode of lightheadedness, imbalance and severe weakness causing a fall. She denies LOC. She has not been taking any medications due to paranoia about healthcare. She did report not having been eating and drinking enough recently. 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   - initial labs remarkable for leukocytosis 13.2, hypokalemia 2.6, hyponatremia 128, hypoalbuminemia 2.8  - given 1 L IVF in ED and correction for electrolyte derangements  - trop T mildly elevated 0.06, flat and suspected syncope led to admission to telemetry  - ECG nonischemic w wandering pacer, not Afib. No indication for AC.  - TTE 2/13/23 normal biV function    Mynor Roberto MD  Cardiology    50 minutes spent on total encounter; more than 50% of the visit was spent counseling and/or coordinating care by the attending physician.  initial encounter date 2/13/23 Attending Attestation:  I was physically present for the key portions of the evaluation and management (E/M) service provided.  I agree with the above history, physical, and plan which I have reviewed with the following edits/addendum:    62F former smoker, hx of breast CA, HTN, HLP, DMT2, hypothyroidism, hypoosm hyponatremia who presented w an episode of lightheadedness, imbalance and severe weakness causing a fall. She denies LOC. She has not been taking any medications due to paranoia about healthcare. She did report not having been eating and drinking enough recently. 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   - initial labs remarkable for leukocytosis 13.2, hypokalemia 2.6, hyponatremia 128, hypoalbuminemia 2.8  - given 1 L IVF in ED and correction for electrolyte derangements  - trop T mildly elevated 0.06, flat and suspected syncope led to admission to telemetry  - ECG nonischemic w wandering pacer, other ECGs w PACs coming in [facility]-- not Afib. No indication for AC.  - TTE 2/13/23 normal biV function with basal inferior, inferoseptal WMA -->CCTA to clarify coronary anatomy.    Mynor Roberto MD  Cardiology    50 minutes spent on total encounter; more than 50% of the visit was spent counseling and/or coordinating care by the attending physician.  initial encounter date 2/13/23

## 2023-02-12 NOTE — H&P ADULT - NSHPLABSRESULTS_GEN_ALL_CORE
CARDIAC MARKERS ( 12 Feb 2023 15:45 )  x     / 0.05 ng/mL / x     / x     / x      CARDIAC MARKERS ( 12 Feb 2023 12:00 )  x     / 0.06 ng/mL / 92 U/L / x     / x                              14.4   14.53 )-----------( 575      ( 12 Feb 2023 12:00 )             41.8   02-12    130<L>  |  80<L>  |  5<L>  ----------------------------<  188<H>  2.2<LL>   |  28  |  0.88    Ca    9.2      12 Feb 2023 12:00  Mg     1.6     02-12    TPro  8.2  /  Alb  3.7  /  TBili  0.8  /  DBili  x   /  AST  12  /  ALT  7<L>  /  AlkPhos  89  02-12    PT/INR - ( 12 Feb 2023 12:00 )   PT: 13.7 sec;   INR: 1.15     PTT - ( 12 Feb 2023 12:00 )  PTT:32.4 sec

## 2023-02-12 NOTE — H&P ADULT - PROBLEM SELECTOR PLAN 3
Hx of paranoid delusions including "they put sedatives in my medicine from the pharmacy", "there is a man who bangs around my apartment", and "all this started when I had an device placed by my dentist in 2005, maybe it's monitoring my brain or tracking me I don't know."  -Na 130 c/w discharge on prior admission, denies ETOH/illicit drug use, no evidence of metabolic encephalopathy  -No diagnosed psych hx per pt and previously refused to be evaluated but is now amenable  -UTox ordered, f/u  -Consult psych in AM No reported hx of Afib, presented alternating between NSR and Afib w/ RVR up to HR 140s  -Currently in NSR in 70s, denies palpitations  -TSH ordered, f/u  -Echo as above  -AC: Started full AC heparin ggt  -Rate control: No standing ordered, consider resuming Toprol 50mg QD  -Consider EP consult pending w/u  -VS per routine, cont tele/pulse ox No reported hx of Afib, presented alternating between NSR and Afib w/ RVR up to HR 140s.     CHADVASC 3.  -Currently in NSR in 70s, denies palpitations  -TSH ordered, f/u  -Echo as above  -AC: Started full AC heparin ggt  -Rate control: No standing ordered, consider resuming Toprol 50mg QD  -Consider EP consult pending w/u  -VS per routine, cont tele/pulse ox

## 2023-02-12 NOTE — H&P ADULT - PROBLEM SELECTOR PLAN 4
A1c 11.9 last admission, has not been compliant w/ meds or follow up with anyone since  -Previously discharge on Glimiperide 1mg QD and  Metformin 500mg BID  -A1c ordered for AM, f/u and consider endo consult  -mISS ordered, FS BGL per routine, DASH consistent carbs Hx of paranoid delusions including "they put sedatives in my medicine from the pharmacy", "there is a man who bangs around my apartment", and "all this started when I had an device placed by my dentist in 2005, maybe it's monitoring my brain or tracking me I don't know."  -Na 130 c/w discharge on prior admission, denies ETOH/illicit drug use, no evidence of metabolic encephalopathy  -No diagnosed psych hx per pt and previously refused to be evaluated but is now amenable  -UTox ordered, f/u  -Consult psych in AM Hx of paranoid delusions including "they put sedatives in my medicine from the pharmacy", "there is a man who bangs around my apartment", and "all this started when I had an device placed by my dentist in 2005, maybe it's monitoring my brain or tracking me I don't know."  -Na 130 c/w discharge on prior admission, denies ETOH/illicit drug use, no evidence of metabolic encephalopathy  -Low BUN, Hyponatremia and Hypokalemia all c/w poor PO intake  -No diagnosed psych hx per pt and previously refused to be evaluated but is now amenable  -UTox ordered, f/u  -Consult psych in AM

## 2023-02-12 NOTE — H&P ADULT - PROBLEM SELECTOR PLAN 1
P/w unwitnessed fall vs syncope 2/12AM. Reports generalized weakness/fatigue for past few weeks, recent poor PO intake for unspecified reasons. No prodrome/post-ictal period, denies LOC or trauma, unclear etiology of fall.  -No established Cardiologist and has not f/u with anyone since last admission 11/2023  -Currently reports generalized fatigue/weakness, otherwise asymptomatic, VSS  -EKG: Afib @80bpm w/ lateral TWI and 1mm STD.     Trop T 0.06 -> 0.05, denies CP/palpitations  -Orthostatic VS ordered, f/u  -Echo and carotid US ordered, f/u  -NPO after midnight for possible ischemic eval in AM  -Consider EP/Neuro consult pending w/u  -Continuous tele/pulse ox, VS per routine, falls precaution

## 2023-02-12 NOTE — ED PROVIDER NOTE - OBJECTIVE STATEMENT
62yF w/ DM HTN HLD hypothyroidism, hyponatremia, paranoia (paranoid delusions of pharmacies putting sedatives in her medications so frequently does not take them)  comes in today for fall 62yF w/ DM HTN HLD hypothyroidism, hyponatremia, paranoia (paranoid delusions of pharmacies putting sedatives in her medications so frequently does not take them)  comes in today for fall. Pt says she lives alone, doesn't have any HHA, walks w/ walker. This AM was walking in the kitchen and fell. Does not know how she fell does not remember what happened. Doesn't know if she tripped, did not have chest pain or palpitations. One minute she was standing and the next thing she knew she was on the floor on her back with the kitchen garbage knocked over. Says has not had an appetite recently. No vomiting or diarrhea, fevers or chills. Has not been taking any of her meds.

## 2023-02-12 NOTE — H&P ADULT - HISTORY OF PRESENT ILLNESS
*Confirmed, pt not taking any home meds since shortly after discharge last admission  61 y/o Female recent former 15-20 pack-year smoker (quit weeks ago) w/ PMHx of non-compliance w/ meds 2/2 paranoid delusions (believes pharmacy puts sedatives in her meds, there is a man banging around my building, and received a tooth implant in 2005 which "monitors her brain" which began all of these symptoms), breast CA , HTN, HLD, T2DM, hypothyroidism and hyponatremia, who initially presented to [facility] ED for evaluation of a fall vs syncope. Pt domiciled alone, walks w/ walker, and reports that this morning she was in her kitchen and fell to the ground knocking over her kitchen garbage can, but does not remember falling or if she tripped or got dizzy. Pt also endorses poor PO intake recently for unspecified reasons. Pt denies any known trauma, and further denies chest pain, SOB, FRY, palpitations, dizziness, N/V/D, fever/chills/sick contact, diaphoresis, orthopnea/PND, and leg swelling.  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  In the ED, VS: T 97.6F, HR 77bpm, /79, RR 18 non-labored, SpO2 99% RA. EKG: Afib @80bpm w/ lateral TWI and 1mm STD. Labs significant for Trop T 0.06 -> 0.05, Na 130, K 2.2, Mg 1.6, and WBC 14.53. CXR unremarkable. Head CT neg for acute changes. S/p IV LR, Lopressor 50mg PO x1, ASA 325mg x1, Mag 2g IV x1, KCL 10mEq IV x1 and KCL 60mEq PO x1 in the ED, pt is now admitted to cardiac tele for further management of fall vs syncope and electrolyte derangement.   *Confirmed, pt not taking any home meds since shortly after discharge last admission  63 y/o Female recent former 15-20 pack-year smoker (quit weeks ago) w/ PMHx of non-compliance w/ meds 2/2 paranoid delusions (believes pharmacy puts sedatives in her meds, there is a man banging around my building, and received a tooth implant in 2005 which "monitors her brain" which began all of these symptoms), breast CA , HTN, HLD, T2DM, hypothyroidism and hyponatremia, who initially presented to [facility] ED for evaluation of a fall vs syncope. Pt domiciled alone, walks w/ walker, and reports that this morning she was in her kitchen and fell to the ground knocking over her kitchen garbage can, but does not remember falling or if she tripped or got dizzy. Pt also endorses poor PO intake recently for unspecified reasons. Pt denies any known trauma, and further denies chest pain, SOB, FRY, palpitations, dizziness, N/V/D, fever/chills/sick contact, diaphoresis, orthopnea/PND, and leg swelling.  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  In the ED, VS: T 97.6F, HR 77bpm w/ occasional episodes of Afib RVR up to HR 140s, /79, RR 18 non-labored, SpO2 99% RA. EKG: Afib @80bpm w/ lateral TWI and 1mm STD. Labs significant for Trop T 0.06 -> 0.05, Na 130, K 2.2, Mg 1.6, and WBC 14.53. CXR unremarkable. Head CT neg for acute changes. S/p IV LR, Lopressor 50mg PO x1, Hydralazine 50mg PO x1, ASA 325mg x1, Mag 2g IV x1, KCL 10mEq IV x1 and KCL 60mEq PO x1 in the ED, pt is now admitted to cardiac tele for further management of fall vs syncope and electrolyte derangement.

## 2023-02-12 NOTE — ED ADULT TRIAGE NOTE - HISTORY OF COVID-19 VACCINATION
Sobeida called and has questions about her birth control and the pharmacy said they sent something here to change her prescription   and wants to know when she can start her b/c Yes

## 2023-02-12 NOTE — ED PROVIDER NOTE - PHYSICAL EXAMINATION
CONST: nontoxic NAD speaking in full sentences  HEAD: atraumatic  EYES: conjunctivae clear, PERRL, EOMI  ENT: mmm  NECK: supple/FROM, nttp  CARD: intermittent irregular tachycardia, intermittent irregular normal rate   CHEST: ctab no r/r/w, no stridor/retractions/tripoding  ABD: soft, nd, nttp, no rebound/guarding  EXT: FROM, symmetric distal pulses intact  SKIN: warm, dry, no rash, no pedal edema/ttp/rash, cap refill <2sec  NEURO: awake alert answering questions following commands moving all extremities lifting all extremities antigravity

## 2023-02-12 NOTE — H&P ADULT - ASSESSMENT
63 y/o Female recent former 15-20 pack-year smoker (quit weeks ago) w/ PMHx of non-compliance w/ meds 2/2 paranoid delusions (believes pharmacy puts sedatives in her meds, there is a man banging around my building, and received a tooth implant in 2005 which "monitors her brain" which began all of these symptoms), breast CA , HTN, HLD, T2DM, hypothyroidism and hyponatremia, who initially presented to [facility] ED for evaluation of a fall vs syncope. Pt domiciled alone, walks w/ walker, and reports that this morning she was in her kitchen and fell to the ground knocking over her kitchen garbage can, but does not remember falling or if she tripped or got dizzy. Pt also endorses poor PO intake recently for unspecified reasons.  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  Pt is now admitted to cardiac tele for further management of fall vs syncope and electrolyte derangement.

## 2023-02-12 NOTE — H&P ADULT - PROBLEM SELECTOR PLAN 6
Hx of hypothyroidism, TSH WNL last admission  -TSH ordered, f/u      VTE ppx: SCD'd ordered (no pharm 2/2 fall risk)  Diet: DASH consistent carbs. NPO after MN for possible ischemic eval in AM  PT consulted, f/u  Dispo: Pending clinical progression Normotensive on arrival  -Previously DC'd on Toprol 50mg QD and Hydralazine 50mg PO TID but non-compliance  -s/p Lopressor 50mg PO x1 and Hydralazine 50mg PO x1 in the ED  -No standing anti-HTN's restarted given syncope and normotensive on arrival    #HLD  -Lipid panel ordered, f/u

## 2023-02-12 NOTE — ED ADULT NURSE NOTE - OBJECTIVE STATEMENT
Pt presenting s/p fall. States unknown if mechanical but does not think she passed out. Denies head strike/LOC. Pt had brief episode of tachycardia, CM showing  BPM. Pt quickly converted to NSR with HR in 70s. IV and blood work obtained, pt medicated as per MD orders. Denies CP/SOB/palpitations/N/V/D.

## 2023-02-12 NOTE — H&P ADULT - NSICDXPASTMEDICALHX_GEN_ALL_CORE_FT
PAST MEDICAL HISTORY:  Essential hypertension HTN (hypertension)    Former smoker     HLD (hyperlipidemia)     Hyponatremia     Hypothyroidism Adult hypothyroidism    Malignant neoplasm of female breast Breast CA, left    Type 2 diabetes mellitus DM type 2 (diabetes mellitus, type 2)

## 2023-02-12 NOTE — ED PROVIDER NOTE - CLINICAL SUMMARY MEDICAL DECISION MAKING FREE TEXT BOX
Unclear if this is syncopal fall or not  patient is poor historian w/ flat affect  1. r/o traumatic injury w/ CTH, cxr, pelvic xr  2. r/o underlying medical cause of fall- infectious, metabolic, cardiac. Pt noted to be in intermittent paroxysmal Afib, would go into Afib rate 140-160 for a couple seconds and then would break back into NSR with PVCs. Gave 50mg metoprolol which is pt's home dose that she does not take due to her baseline paranoia. Workup significant for hypomagnesemia to 1.6 and hypokalemia to 2.2, sodium 130. Troponin 0.06-->0.05. Plan to replete electrolytes, admit to cardiology. Unclear etiology of hypokalemia, pt says she has not been having vomiting or diarrhea, consider from malnourishment. Unclear if this is syncopal fall or not  patient is poor historian w/ flat affect  1. r/o traumatic injury w/ CTH, cxr, pelvic xr  2. r/o underlying medical cause of fall- infectious, metabolic, cardiac. Pt noted to be in intermittent paroxysmal Afib, would go into Afib rate 140-160 for a couple seconds and then would break back into NSR with PVCs. Gave 50mg metoprolol which is pt's home dose that she does not take due to her baseline paranoia. Workup significant for hypomagnesemia to 1.6 and hypokalemia to 2.2, sodium 130. Troponin 0.06-->0.05. Plan to replete electrolytes, admit to cardiology. Unclear etiology of hypokalemia, pt says she has not been having vomiting or diarrhea, consider from malnourishment.  WBC 14, afebrile and no infectious complaints, UA negative, CXR no pneumonia, COVID negative.

## 2023-02-12 NOTE — H&P ADULT - PROBLEM SELECTOR PLAN 7
Hx of hypothyroidism, TSH WNL last admission  -TSH ordered, f/u      VTE ppx: SCD'd ordered (no pharm 2/2 fall risk)  Diet: DASH consistent carbs. NPO after MN for possible ischemic eval in AM  PT consulted, f/u  Dispo: Pending clinical progression Hx of hypothyroidism, TSH WNL last admission  -TSH ordered, f/u      VTE ppx: Heparin ggt (for pAfib)  Diet: DASH consistent carbs. NPO after MN for possible ischemic eval in AM  PT consulted, f/u  Dispo: Pending clinical progression

## 2023-02-12 NOTE — ED PROVIDER NOTE - NS ED MD DISPO SPECIAL CONSIDERATION1
Is This A New Presentation, Or A Follow-Up?: Growth What Type Of Note Output Would You Prefer (Optional)?: Standard Output Has Your Skin Lesion Been Treated?: not been treated None

## 2023-02-12 NOTE — H&P ADULT - PROBLEM SELECTOR PLAN 2
P/w K Na 130, K 2.2, Mg 1.6.  -s/p Mag 2g IV x1 and KCL 10mEq IV and 60mEq PO in ED  -Given additional KCL 40mEq PO q4hrs x3  -f/u repeat BMP and Mag tonight (2/12PM)  -Prior admission 11/2022 p/w generalized weakness and poor PO intake w/ subsequent MICU admission for severe hyponatremia likely 2/2 primary polydipsia and poor solid food intake likely 2/2 psychogenic primary polydipsia vs tea and toast diet, high anion gap metabolic acidosis w/ respiratory compensation likely 2/2 starvation ketosis i/s/o decreased PO intake

## 2023-02-12 NOTE — H&P ADULT - PROBLEM SELECTOR PLAN 5
Normotensive on arrival  -Previously DC'd on Toprol 50mg QD and Hydralazine 50mg PO TID but non-compliance  -No anti-HTN meds restarted given syncope and normotensive on arrival    #HLD  -Lipid panel ordered, f/u A1c 11.9 last admission, has not been compliant w/ meds or follow up with anyone since  -Previously discharge on Glimiperide 1mg QD and  Metformin 500mg BID  -A1c ordered for AM, f/u and consider endo consult  -mISS ordered, FS BGL per routine, DASH consistent carbs

## 2023-02-13 DIAGNOSIS — I25.10 ATHEROSCLEROTIC HEART DISEASE OF NATIVE CORONARY ARTERY WITHOUT ANGINA PECTORIS: ICD-10-CM

## 2023-02-13 DIAGNOSIS — E87.1 HYPO-OSMOLALITY AND HYPONATREMIA: ICD-10-CM

## 2023-02-13 DIAGNOSIS — E78.5 HYPERLIPIDEMIA, UNSPECIFIED: ICD-10-CM

## 2023-02-13 LAB
A1C WITH ESTIMATED AVERAGE GLUCOSE RESULT: 7.8 % — HIGH (ref 4–5.6)
AMPHET UR-MCNC: NEGATIVE — SIGNIFICANT CHANGE UP
ANION GAP SERPL CALC-SCNC: 12 MMOL/L — SIGNIFICANT CHANGE UP (ref 5–17)
ANION GAP SERPL CALC-SCNC: 9 MMOL/L — SIGNIFICANT CHANGE UP (ref 5–17)
APPEARANCE UR: CLEAR — SIGNIFICANT CHANGE UP
APTT BLD: 66.4 SEC — HIGH (ref 27.5–35.5)
BACTERIA # UR AUTO: SIGNIFICANT CHANGE UP /HPF
BARBITURATES UR SCN-MCNC: NEGATIVE — SIGNIFICANT CHANGE UP
BASOPHILS # BLD AUTO: 0.09 K/UL — SIGNIFICANT CHANGE UP (ref 0–0.2)
BASOPHILS NFR BLD AUTO: 0.7 % — SIGNIFICANT CHANGE UP (ref 0–2)
BENZODIAZ UR-MCNC: NEGATIVE — SIGNIFICANT CHANGE UP
BILIRUB UR-MCNC: ABNORMAL
BUN SERPL-MCNC: 6 MG/DL — LOW (ref 7–23)
BUN SERPL-MCNC: 8 MG/DL — SIGNIFICANT CHANGE UP (ref 7–23)
CALCIUM SERPL-MCNC: 7.8 MG/DL — LOW (ref 8.4–10.5)
CALCIUM SERPL-MCNC: 8.3 MG/DL — LOW (ref 8.4–10.5)
CHLORIDE SERPL-SCNC: 91 MMOL/L — LOW (ref 96–108)
CHLORIDE SERPL-SCNC: 92 MMOL/L — LOW (ref 96–108)
CHOLEST SERPL-MCNC: 173 MG/DL — SIGNIFICANT CHANGE UP
CO2 SERPL-SCNC: 28 MMOL/L — SIGNIFICANT CHANGE UP (ref 22–31)
CO2 SERPL-SCNC: 29 MMOL/L — SIGNIFICANT CHANGE UP (ref 22–31)
COCAINE METAB.OTHER UR-MCNC: NEGATIVE — SIGNIFICANT CHANGE UP
COLOR SPEC: YELLOW — SIGNIFICANT CHANGE UP
CREAT ?TM UR-MCNC: 97 MG/DL — SIGNIFICANT CHANGE UP
CREAT SERPL-MCNC: 0.72 MG/DL — SIGNIFICANT CHANGE UP (ref 0.5–1.3)
CREAT SERPL-MCNC: 1.06 MG/DL — SIGNIFICANT CHANGE UP (ref 0.5–1.3)
DIFF PNL FLD: ABNORMAL
EGFR: 59 ML/MIN/1.73M2 — LOW
EGFR: 94 ML/MIN/1.73M2 — SIGNIFICANT CHANGE UP
EOSINOPHIL # BLD AUTO: 0.15 K/UL — SIGNIFICANT CHANGE UP (ref 0–0.5)
EOSINOPHIL NFR BLD AUTO: 1.2 % — SIGNIFICANT CHANGE UP (ref 0–6)
EPI CELLS # UR: ABNORMAL /HPF (ref 0–5)
ESTIMATED AVERAGE GLUCOSE: 177 MG/DL — HIGH (ref 68–114)
GLUCOSE BLDC GLUCOMTR-MCNC: 128 MG/DL — HIGH (ref 70–99)
GLUCOSE BLDC GLUCOMTR-MCNC: 133 MG/DL — HIGH (ref 70–99)
GLUCOSE BLDC GLUCOMTR-MCNC: 163 MG/DL — HIGH (ref 70–99)
GLUCOSE BLDC GLUCOMTR-MCNC: 192 MG/DL — HIGH (ref 70–99)
GLUCOSE SERPL-MCNC: 130 MG/DL — HIGH (ref 70–99)
GLUCOSE SERPL-MCNC: 195 MG/DL — HIGH (ref 70–99)
GLUCOSE UR QL: 100
HCT VFR BLD CALC: 36.1 % — SIGNIFICANT CHANGE UP (ref 34.5–45)
HCT VFR BLD CALC: 36.8 % — SIGNIFICANT CHANGE UP (ref 34.5–45)
HDLC SERPL-MCNC: 30 MG/DL — LOW
HGB BLD-MCNC: 12.3 G/DL — SIGNIFICANT CHANGE UP (ref 11.5–15.5)
HGB BLD-MCNC: 12.6 G/DL — SIGNIFICANT CHANGE UP (ref 11.5–15.5)
IMM GRANULOCYTES NFR BLD AUTO: 0.7 % — SIGNIFICANT CHANGE UP (ref 0–0.9)
KETONES UR-MCNC: 40 MG/DL
LEUKOCYTE ESTERASE UR-ACNC: NEGATIVE — SIGNIFICANT CHANGE UP
LIPID PNL WITH DIRECT LDL SERPL: 118 MG/DL — HIGH
LYMPHOCYTES # BLD AUTO: 2.94 K/UL — SIGNIFICANT CHANGE UP (ref 1–3.3)
LYMPHOCYTES # BLD AUTO: 23.7 % — SIGNIFICANT CHANGE UP (ref 13–44)
MAGNESIUM SERPL-MCNC: 1.9 MG/DL — SIGNIFICANT CHANGE UP (ref 1.6–2.6)
MCHC RBC-ENTMCNC: 27.6 PG — SIGNIFICANT CHANGE UP (ref 27–34)
MCHC RBC-ENTMCNC: 27.7 PG — SIGNIFICANT CHANGE UP (ref 27–34)
MCHC RBC-ENTMCNC: 34.1 GM/DL — SIGNIFICANT CHANGE UP (ref 32–36)
MCHC RBC-ENTMCNC: 34.2 GM/DL — SIGNIFICANT CHANGE UP (ref 32–36)
MCV RBC AUTO: 80.9 FL — SIGNIFICANT CHANGE UP (ref 80–100)
MCV RBC AUTO: 80.9 FL — SIGNIFICANT CHANGE UP (ref 80–100)
METHADONE UR-MCNC: NEGATIVE — SIGNIFICANT CHANGE UP
MONOCYTES # BLD AUTO: 0.73 K/UL — SIGNIFICANT CHANGE UP (ref 0–0.9)
MONOCYTES NFR BLD AUTO: 5.9 % — SIGNIFICANT CHANGE UP (ref 2–14)
NEUTROPHILS # BLD AUTO: 8.38 K/UL — HIGH (ref 1.8–7.4)
NEUTROPHILS NFR BLD AUTO: 67.8 % — SIGNIFICANT CHANGE UP (ref 43–77)
NITRITE UR-MCNC: NEGATIVE — SIGNIFICANT CHANGE UP
NON HDL CHOLESTEROL: 143 MG/DL — HIGH
NRBC # BLD: 0 /100 WBCS — SIGNIFICANT CHANGE UP (ref 0–0)
NRBC # BLD: 0 /100 WBCS — SIGNIFICANT CHANGE UP (ref 0–0)
OPIATES UR-MCNC: NEGATIVE — SIGNIFICANT CHANGE UP
OSMOLALITY UR: 489 MOSM/KG — SIGNIFICANT CHANGE UP (ref 300–900)
PCP SPEC-MCNC: SIGNIFICANT CHANGE UP
PCP UR-MCNC: NEGATIVE — SIGNIFICANT CHANGE UP
PH UR: 7 — SIGNIFICANT CHANGE UP (ref 5–8)
PLATELET # BLD AUTO: 503 K/UL — HIGH (ref 150–400)
PLATELET # BLD AUTO: 524 K/UL — HIGH (ref 150–400)
POTASSIUM SERPL-MCNC: 3.1 MMOL/L — LOW (ref 3.5–5.3)
POTASSIUM SERPL-MCNC: 3.5 MMOL/L — SIGNIFICANT CHANGE UP (ref 3.5–5.3)
POTASSIUM SERPL-SCNC: 3.1 MMOL/L — LOW (ref 3.5–5.3)
POTASSIUM SERPL-SCNC: 3.5 MMOL/L — SIGNIFICANT CHANGE UP (ref 3.5–5.3)
POTASSIUM UR-SCNC: 18 MMOL/L — SIGNIFICANT CHANGE UP
PROT ?TM UR-MCNC: 33 MG/DL — HIGH (ref 0–12)
PROT UR-MCNC: 30 MG/DL
PROT/CREAT UR-RTO: 0.3 RATIO — HIGH (ref 0–0.2)
RBC # BLD: 4.46 M/UL — SIGNIFICANT CHANGE UP (ref 3.8–5.2)
RBC # BLD: 4.55 M/UL — SIGNIFICANT CHANGE UP (ref 3.8–5.2)
RBC # FLD: 13.6 % — SIGNIFICANT CHANGE UP (ref 10.3–14.5)
RBC # FLD: 14 % — SIGNIFICANT CHANGE UP (ref 10.3–14.5)
RBC CASTS # UR COMP ASSIST: < 5 /HPF — SIGNIFICANT CHANGE UP
SODIUM SERPL-SCNC: 129 MMOL/L — LOW (ref 135–145)
SODIUM SERPL-SCNC: 132 MMOL/L — LOW (ref 135–145)
SODIUM UR-SCNC: 68 MMOL/L — SIGNIFICANT CHANGE UP
SP GR SPEC: <=1.005 — SIGNIFICANT CHANGE UP (ref 1–1.03)
THC UR QL: NEGATIVE — SIGNIFICANT CHANGE UP
TRIGL SERPL-MCNC: 125 MG/DL — SIGNIFICANT CHANGE UP
UROBILINOGEN FLD QL: 0.2 E.U./DL — SIGNIFICANT CHANGE UP
UUN UR-MCNC: 225 MG/DL — SIGNIFICANT CHANGE UP
WBC # BLD: 12.38 K/UL — HIGH (ref 3.8–10.5)
WBC # BLD: 13.2 K/UL — HIGH (ref 3.8–10.5)
WBC # FLD AUTO: 12.38 K/UL — HIGH (ref 3.8–10.5)
WBC # FLD AUTO: 13.2 K/UL — HIGH (ref 3.8–10.5)
WBC UR QL: < 5 /HPF — SIGNIFICANT CHANGE UP

## 2023-02-13 PROCEDURE — 99223 1ST HOSP IP/OBS HIGH 75: CPT

## 2023-02-13 PROCEDURE — 99222 1ST HOSP IP/OBS MODERATE 55: CPT

## 2023-02-13 PROCEDURE — 93880 EXTRACRANIAL BILAT STUDY: CPT | Mod: 26

## 2023-02-13 PROCEDURE — 93306 TTE W/DOPPLER COMPLETE: CPT | Mod: 26

## 2023-02-13 PROCEDURE — 75574 CT ANGIO HRT W/3D IMAGE: CPT | Mod: 26

## 2023-02-13 RX ORDER — ENOXAPARIN SODIUM 100 MG/ML
40 INJECTION SUBCUTANEOUS EVERY 24 HOURS
Refills: 0 | Status: DISCONTINUED | OUTPATIENT
Start: 2023-02-13 | End: 2023-02-23

## 2023-02-13 RX ORDER — ASPIRIN/CALCIUM CARB/MAGNESIUM 324 MG
81 TABLET ORAL DAILY
Refills: 0 | Status: DISCONTINUED | OUTPATIENT
Start: 2023-02-13 | End: 2023-02-23

## 2023-02-13 RX ORDER — MAGNESIUM OXIDE 400 MG ORAL TABLET 241.3 MG
400 TABLET ORAL ONCE
Refills: 0 | Status: COMPLETED | OUTPATIENT
Start: 2023-02-13 | End: 2023-02-13

## 2023-02-13 RX ORDER — POTASSIUM CHLORIDE 20 MEQ
20 PACKET (EA) ORAL ONCE
Refills: 0 | Status: COMPLETED | OUTPATIENT
Start: 2023-02-13 | End: 2023-02-13

## 2023-02-13 RX ORDER — LISINOPRIL 2.5 MG/1
5 TABLET ORAL DAILY
Refills: 0 | Status: DISCONTINUED | OUTPATIENT
Start: 2023-02-13 | End: 2023-02-14

## 2023-02-13 RX ORDER — INFLUENZA VIRUS VACCINE 15; 15; 15; 15 UG/.5ML; UG/.5ML; UG/.5ML; UG/.5ML
0.5 SUSPENSION INTRAMUSCULAR ONCE
Refills: 0 | Status: DISCONTINUED | OUTPATIENT
Start: 2023-02-13 | End: 2023-02-23

## 2023-02-13 RX ORDER — POTASSIUM CHLORIDE 20 MEQ
40 PACKET (EA) ORAL ONCE
Refills: 0 | Status: COMPLETED | OUTPATIENT
Start: 2023-02-13 | End: 2023-02-13

## 2023-02-13 RX ORDER — SPIRONOLACTONE 25 MG/1
25 TABLET, FILM COATED ORAL DAILY
Refills: 0 | Status: DISCONTINUED | OUTPATIENT
Start: 2023-02-13 | End: 2023-02-23

## 2023-02-13 RX ORDER — POTASSIUM CHLORIDE 20 MEQ
40 PACKET (EA) ORAL EVERY 4 HOURS
Refills: 0 | Status: COMPLETED | OUTPATIENT
Start: 2023-02-13 | End: 2023-02-13

## 2023-02-13 RX ADMIN — Medication 40 MILLIEQUIVALENT(S): at 01:06

## 2023-02-13 RX ADMIN — HEPARIN SODIUM 1200 UNIT(S)/HR: 5000 INJECTION INTRAVENOUS; SUBCUTANEOUS at 04:12

## 2023-02-13 RX ADMIN — MAGNESIUM OXIDE 400 MG ORAL TABLET 400 MILLIGRAM(S): 241.3 TABLET ORAL at 09:13

## 2023-02-13 RX ADMIN — HEPARIN SODIUM 1200 UNIT(S)/HR: 5000 INJECTION INTRAVENOUS; SUBCUTANEOUS at 08:35

## 2023-02-13 RX ADMIN — Medication 2: at 17:47

## 2023-02-13 RX ADMIN — Medication 40 MILLIEQUIVALENT(S): at 13:53

## 2023-02-13 RX ADMIN — LISINOPRIL 5 MILLIGRAM(S): 2.5 TABLET ORAL at 13:54

## 2023-02-13 RX ADMIN — ENOXAPARIN SODIUM 40 MILLIGRAM(S): 100 INJECTION SUBCUTANEOUS at 19:07

## 2023-02-13 RX ADMIN — Medication 40 MILLIEQUIVALENT(S): at 21:42

## 2023-02-13 RX ADMIN — SPIRONOLACTONE 25 MILLIGRAM(S): 25 TABLET, FILM COATED ORAL at 13:53

## 2023-02-13 RX ADMIN — Medication 40 MILLIEQUIVALENT(S): at 09:12

## 2023-02-13 RX ADMIN — Medication 20 MILLIEQUIVALENT(S): at 10:00

## 2023-02-13 RX ADMIN — Medication 81 MILLIGRAM(S): at 19:06

## 2023-02-13 NOTE — BH CONSULTATION LIAISON ASSESSMENT NOTE - RISK ASSESSMENT
low acute suicide risk as no SI (ever) or evidence for such, no desperation insomnia or behaviors cw acute risk.  low violence risk per lack of hx violence, and no TC c/w anger, vengeance or other markers of high risk.  Not being treated for mental health issues raises risk, though this can happen most effectively once pt builds alliance as outpt

## 2023-02-13 NOTE — DIETITIAN INITIAL EVALUATION ADULT - NUTRITIONGOAL OUTCOME1
- Pt will maintain wt within 10% while inpatient  - Pt will meet 75% or more of protein/energy needs via most appropriate route for nutrition.

## 2023-02-13 NOTE — PROGRESS NOTE ADULT - PROBLEM SELECTOR PLAN 9
total cholesterol: 173, LDL: 118, HDL: 30  - Start Atorvastatin 40 mg daily ECHO as above w/ basal inferoseptal segment and basal inferior segment abnormal  - F/u CCTA 2/13/2023  - Start Atorvastatin 40 mg daily and Aspirin 81 mg daily    F: none   E: K >4, Mg > 2  N: DASH, TLC  Dvt: lovenox   Dispo: pending clinical progression   PT consulted - f/u recs  Case d/w Dr. Roberto

## 2023-02-13 NOTE — PROGRESS NOTE ADULT - PROBLEM SELECTOR PLAN 7
Hx of hypothyroidism, TSH WNL last admission  -TSH ordered, f/u      VTE ppx: Heparin ggt (for pAfib)  Diet: DASH consistent carbs. NPO after MN for possible ischemic eval in AM  PT consulted, f/u  Dispo: Pending clinical progression Started on Spironolactone 25 mg daily and Lisinopril 5 mg daily  - Will start Amlodipine 5 mg daily tomorrow 2/14/2023 Hx of hypothyroidism, TSH WNL last admission  - TSH WNL   - No medications at this time

## 2023-02-13 NOTE — BH CONSULTATION LIAISON ASSESSMENT NOTE - NSBHATTESTCOMMENTATTENDFT_PSY_A_CORE
61yo F hx breast CA , HTN, HLD, T2DM, hypothyroidism and hyponatremia, presented for fall vs syncope, found to have hyponatremia, admitted to cardiology.  d/w team, psychiatry consulted to determine if admission needed as pt will soon be medically cleared for discharge, and there is concern for longstanding psychosis.  No Si/HI/violence/agitation.  Pt assessed with psychology intern, chart reviewed jesus Nov CL assessment.  Chronic psychosis c/w schizophrenia, jesus longstanding delusions incl somatic, persecutory, PI.  At this time, pt is grossly organized, concerned for her health, seeks ongoing PMD and medical care though has difficulties finding someone who takes her insurance.  She is concerned that Rx may be tampered with but problem-solves by trying a different pharmacy.  Pt expresses interest in seeing SW to help arrange aftercare plans, also to begin therapy to help with anxiety.  Pt denies any hx of SI/HA.  Pt with no dangerous behaviors or TC.  Though she is “tired” of the person in her building who annoys her by slamming doors, etc, pt has no ill will or HI, no desperation, no changes in situation in 20yrs or new acuity.  She has adequate reality testing for safe functioning jesus re: PO and Rx adherence.  Pt problem-solving her broken door with building [employer], had sought EMS herself to come here.  No grounds to hold involuntarily.  Pt resistant to psychiatry, not wanting Rx, would be best engaged with outpt relationship, introduce medications in that context.  -Pt is psychiatrically cleared for discharge  -SW to please help with logistics re: f/u appts  -f/u psychiatry outpt as above

## 2023-02-13 NOTE — BH CONSULTATION LIAISON ASSESSMENT NOTE - CURRENT MEDICATION
MEDICATIONS  (STANDING):  dextrose 5%. 1000 milliLiter(s) (50 mL/Hr) IV Continuous <Continuous>  dextrose 5%. 1000 milliLiter(s) (100 mL/Hr) IV Continuous <Continuous>  dextrose 50% Injectable 25 Gram(s) IV Push once  dextrose 50% Injectable 12.5 Gram(s) IV Push once  dextrose 50% Injectable 25 Gram(s) IV Push once  enoxaparin Injectable 40 milliGRAM(s) SubCutaneous every 24 hours  glucagon  Injectable 1 milliGRAM(s) IntraMuscular once  influenza   Vaccine 0.5 milliLiter(s) IntraMuscular once  insulin lispro (ADMELOG) corrective regimen sliding scale   SubCutaneous three times a day before meals  insulin lispro (ADMELOG) corrective regimen sliding scale   SubCutaneous at bedtime  lactated ringers. 1000 milliLiter(s) (100 mL/Hr) IV Continuous <Continuous>  lisinopril 5 milliGRAM(s) Oral daily  spironolactone 25 milliGRAM(s) Oral daily    MEDICATIONS  (PRN):  dextrose Oral Gel 15 Gram(s) Oral once PRN Blood Glucose LESS THAN 70 milliGRAM(s)/deciliter

## 2023-02-13 NOTE — PROGRESS NOTE ADULT - PROBLEM SELECTOR PLAN 10
ECHO as above w/ basal inferoseptal segment and basal inferior segment abnormal  - F/u CCTA 2/13/2023  - Start Atorvastatin 40 mg daily and Aspirin 81 mg daily    F: none   E: K >4, Mg > 2  N: DASH, TLC  Dvt: lovenox   Dispo: pending clinical progression   PT consulted - f/u recs

## 2023-02-13 NOTE — PROGRESS NOTE ADULT - ASSESSMENT
Patient is a 61 y/o Female recent former 15-20 pack-year smoker (quit weeks ago) w/ PMHx of non-compliance w/ meds 2/2 paranoid delusions, HTN, HLD, DM, hypothyroidism, hyponatremia, breast cancer, who presented to [facility] ED on 2/12/23 for evaluation of a fall vs syncope.  Pt domiciled alone, walks w/ walker, and reports that this morning she was in her kitchen and fell to the ground knocking over her kitchen garbage can, but does not remember falling or if she tripped or got dizzy. Pt also endorses poor PO intake recently for unspecified reasons. 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  Pt is now admitted to cardiac tele for further management of fall vs syncope and electrolyte derangement. Patient is a 63 y/o Female recent former 15-20 pack-year smoker (quit weeks ago) w/ PMHx of non-compliance w/ meds 2/2 paranoid delusions, HTN, HLD, DM, hypothyroidism, hyponatremia, breast cancer, who presented to [facility] ED on 2/12/23 for evaluation of a fall vs syncope.  Pt domiciled alone, walks w/ walker, and reports that this morning she was in her kitchen and fell to the ground knocking over her kitchen garbage can, but does not remember falling or if she tripped or got dizzy. Pt also endorses poor PO intake recently for unspecified reasons. Of note, pt had prior admission in 11/2022 p/w generalized weakness and poor PO intake w/ subsequent MICU admission for severe hyponatremia. Patient now admitted to cardiology/telemetry for further management of syncope vs. fall.

## 2023-02-13 NOTE — PROGRESS NOTE ADULT - PROBLEM SELECTOR PLAN 5
A1c 11.9 last admission, has not been compliant w/ meds or follow up with anyone since  -Previously discharge on Glimiperide 1mg QD and  Metformin 500mg BID  -A1c ordered for AM, f/u and consider endo consult  -mISS ordered, FS BGL per routine, DASH consistent carbs Hx of paranoid delusions including "they put sedatives in my medicine from the pharmacy", "there is a man who bangs around my apartment", and "all this started when I had an device placed by my dentist in 2005, maybe it's monitoring my brain or tracking me I don't know."  - Na 130 c/w discharge on prior admission, denies ETOH/illicit drug use, no evidence of metabolic encephalopathy  - Low BUN, Hyponatremia and Hypokalemia all c/w poor PO intake  - No diagnosed psych hx per pt and previously refused to be evaluated but is now amenable  - Utox negative  - Anurag consulted, f/u recs A1c 11.9 last admission, has not been compliant w/ meds or follow up with anyone since  - Previously discharge on Glimiperide 1mg QD and  Metformin 500mg BID  - A1c: 7.8  - Continue MISS

## 2023-02-13 NOTE — BH CONSULTATION LIAISON ASSESSMENT NOTE - DESCRIPTION
Pt is a 62-year-old White, cisgender woman, domiciled alone in an apartment on [address]. She reported that her mother and father  when she was in her early 20s and that the experience negatively and significantly impacted her. Pt denies close relationships or support system nearby, although she does report that her mother lives in [place] and her brother lives on [address], but she has been out of touch with her brother in the last few years (she lived with him briefly in the fall of 2019). She stated that she also has one close friend who lives on [address]. 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 She currently has social security benefits. She stated that she enjoys listening to talk radio and she sometimes cooks, though her appetite fluctuates.

## 2023-02-13 NOTE — PROGRESS NOTE ADULT - PROBLEM SELECTOR PLAN 1
P/w unwitnessed fall vs syncope 2/12AM. Reports generalized weakness/fatigue for past few weeks, recent poor PO intake for unspecified reasons. No prodrome/post-ictal period, denies LOC or trauma, unclear etiology of fall.  -No established Cardiologist and has not f/u with anyone since last admission 11/2023  -Currently reports generalized fatigue/weakness, otherwise asymptomatic, VSS  -EKG: Afib @80bpm w/ lateral TWI and 1mm STD.     Trop T 0.06 -> 0.05, denies CP/palpitations  -Orthostatic VS ordered, f/u  -Echo and carotid US ordered, f/u  -NPO after midnight for possible ischemic eval in AM  -Consider EP/Neuro consult pending w/u  -Continuous tele/pulse ox, VS per routine, falls precaution Patient presents with unwitnessed fall vs syncope - reports generalized weakness/fatigue for the past few weeks and recent poor intake. Pt denies LOC, prodrome/post-ictal period, or trauma  - Currently reports generalized fatigue/weakness  - EKG 2/12/2023 read as afib, however likely NSR with PACS  - Troponin T peaked at 0.06  - Patient orthostatic + on 2/13/2023  - ECHO 02/13/2023:  LVSF is borderline with a calculated by 50-55%. Grade I DD. RV is normal in size. RVSF is normal. Basal inferoseptal segment and basal inferior segment are abnormal. Normal motion in the remaining regions. No significant valvular disease. EF: 50-55%.   - CCTA ordered, f/u results

## 2023-02-13 NOTE — DIETITIAN INITIAL EVALUATION ADULT - OTHER CALCULATIONS
IBW used to calculate needs due to pt's current body weight exceeding 120% of IBW adjusted for recent wt loss.

## 2023-02-13 NOTE — DIETITIAN INITIAL EVALUATION ADULT - ADD RECOMMEND
-Continue current diet   -Gather full NFPE and nutrition hx as able at f/u; confirm wt changes   -Encourage good PO intake   -Honor food preferences as able   -Monitor chemistry, GI fxn, and skin integrity

## 2023-02-13 NOTE — PROGRESS NOTE ADULT - PROBLEM SELECTOR PLAN 2
P/w K Na 130, K 2.2, Mg 1.6.  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

## 2023-02-13 NOTE — BH CONSULTATION LIAISON ASSESSMENT NOTE - NSICDXBHSECONDARYDX_PSY_ALL_CORE
Fall at home   W19.XXXA  Paranoid delusion   F22  HTN (hypertension)   I10  DM (diabetes mellitus)   E11.9  Hypothyroidism   E03.9  Electrolyte abnormality   E87.8  Paroxysmal atrial fibrillation   I48.0  Hyponatremia   E87.1  Hyperlipidemia   E78.5  CAD (coronary artery disease)   I25.10

## 2023-02-13 NOTE — PROGRESS NOTE ADULT - PROBLEM SELECTOR PLAN 8
Hx of hypothyroidism, TSH WNL last admission  - TSH WNL   - No medications at this time total cholesterol: 173, LDL: 118, HDL: 30  - Start Atorvastatin 40 mg daily

## 2023-02-13 NOTE — PROGRESS NOTE ADULT - ATTENDING SUPERVISION STATEMENT
Resident
Fellow
Resident
Clindamycin Pregnancy And Lactation Text: This medication can be used in pregnancy if certain situations. Clindamycin is also present in breast milk.

## 2023-02-13 NOTE — DIETITIAN INITIAL EVALUATION ADULT - PERTINENT LABORATORY DATA
02-13    132<L>  |  91<L>  |  6<L>  ----------------------------<  130<H>  3.1<L>   |  29  |  0.72    Ca    8.3<L>      13 Feb 2023 05:30  Mg     1.9     02-13    TPro  6.7  /  Alb  2.8<L>  /  TBili  0.5  /  DBili  x   /  AST  9<L>  /  ALT  6<L>  /  AlkPhos  71  02-12  POCT Blood Glucose.: 133 mg/dL (02-13-23 @ 13:49)  A1C with Estimated Average Glucose Result: 7.8 % (02-13-23 @ 05:30)  A1C with Estimated Average Glucose Result: 11.9 % (11-15-22 @ 05:03)

## 2023-02-13 NOTE — PROGRESS NOTE ADULT - PROBLEM SELECTOR PLAN 6
Normotensive on arrival  -Previously DC'd on Toprol 50mg QD and Hydralazine 50mg PO TID but non-compliance  -s/p Lopressor 50mg PO x1 and Hydralazine 50mg PO x1 in the ED  -No standing anti-HTN's restarted given syncope and normotensive on arrival    #HLD  -Lipid panel ordered, f/u A1c 11.9 last admission, has not been compliant w/ meds or follow up with anyone since  - Previously discharge on Glimiperide 1mg QD and  Metformin 500mg BID  - A1c: 7.8  - Continue MISS Started on Spironolactone 25 mg daily and Lisinopril 5 mg daily  - Will start Amlodipine 5 mg daily tomorrow 2/14/2023

## 2023-02-13 NOTE — PROGRESS NOTE ADULT - SUBJECTIVE AND OBJECTIVE BOX
Interventional Cardiology PA Adult Progress Note    Subjective Assessment: Patient seen and examined at bedside. Patient stating that she does not think she lost consciousness during her fall yesterday. Patient states that she has not been taking any medications that were prescribed to her after her last admission. Patient also states that she has not been eating or drinking much in the past few weeks. Patient denies dizziness, lightheadedness, chest pain, palpitations, fever, chills, or other complaints.     ROS Negative except as per Subjective and HPI  	  MEDICATIONS:  lisinopril 5 milliGRAM(s) Oral daily  spironolactone 25 milliGRAM(s) Oral daily  dextrose 50% Injectable 25 Gram(s) IV Push once  dextrose 50% Injectable 12.5 Gram(s) IV Push once  dextrose 50% Injectable 25 Gram(s) IV Push once  dextrose Oral Gel 15 Gram(s) Oral once PRN  glucagon  Injectable 1 milliGRAM(s) IntraMuscular once  insulin lispro (ADMELOG) corrective regimen sliding scale   SubCutaneous three times a day before meals  insulin lispro (ADMELOG) corrective regimen sliding scale   SubCutaneous at bedtime  dextrose 5%. 1000 milliLiter(s) IV Continuous <Continuous>  dextrose 5%. 1000 milliLiter(s) IV Continuous <Continuous>  heparin  Infusion.  Unit(s)/Hr IV Continuous <Continuous>  influenza   Vaccine 0.5 milliLiter(s) IntraMuscular once  lactated ringers. 1000 milliLiter(s) IV Continuous <Continuous>      [PHYSICAL EXAM:  TELEMETRY:  T(C): 36.2 (02-13-23 @ 14:04), Max: 36.7 (02-12-23 @ 18:42)  HR: 85 (02-13-23 @ 13:50) (59 - 85)  BP: 154/94 (02-13-23 @ 13:50) (111/79 - 180/80)  RR: 18 (02-13-23 @ 13:50) (16 - 18)  SpO2: 97% (02-13-23 @ 13:50) (96% - 99%)  Wt(kg): --  I&O's Summary    12 Feb 2023 07:01  -  13 Feb 2023 07:00  --------------------------------------------------------  IN: 672 mL / OUT: 500 mL / NET: 172 mL    13 Feb 2023 07:01  -  13 Feb 2023 14:24  --------------------------------------------------------  IN: 0 mL / OUT: 650 mL / NET: -650 mL      Height (cm): 163.8 (02-13 @ 00:58)  Weight (kg): 76.5 (02-13 @ 00:58)  BMI (kg/m2): 28.5 (02-13 @ 00:58)  BSA (m2): 1.83 (02-13 @ 00:58)  Harman:  Central/PICC/Mid Line:                                         Appearance: Normal, sitting comfortably in bed   HEENT:   Normal oral mucosa, PERRL, EOMI	  Neck: Supple, - JVD; No Carotid Bruit   Cardiovascular: Normal S1 S2, No JVD, No murmurs,   Respiratory: Lungs clear to auscultation  Gastrointestinal:  Soft, Non-tender, + BS	  Skin: No rashes, No ecchymoses, No cyanosis  Extremities: Normal range of motion, No clubbing, cyanosis or edema  Vascular: Peripheral pulses palpable 2+ bilaterally  Neurologic: Non-focal  Psychiatry: A & O x 3, Mood & affect appropriate      	    DIAGNOSTIC TESTING:  [x ] Echocardiogram 02/13/2023:   1. Left ventricular systolic function is borderline reduced with a   calculated ejection fraction of 50-55% with regional wall motion   abnormalities. Basal inferoseptal segment and basal inferior segment are   abnormal. Normal motion in the remaining regions.   2. Grade I left ventricular diastolic dysfunction.   3. The right ventricle is normal in size. Right ventricular systolic   function is normal.   4. No significant valvular disease.   5. No pericardial effusion.   6. No prior echo is available for comparison.      LABS:	 	  CARDIAC MARKERS: Troponin peaked at 0.06 --> 0.05                     12.6   12.38 )-----------( 524      ( 13 Feb 2023 05:30 )             36.8     02-13    132<L>  |  91<L>  |  6<L>  ----------------------------<  130<H>  3.1<L>   |  29  |  0.72    Ca    8.3<L>      13 Feb 2023 05:30  Mg     1.9     02-13    TPro  6.7  /  Alb  2.8<L>  /  TBili  0.5  /  DBili  x   /  AST  9<L>  /  ALT  6<L>  /  AlkPhos  71  02-12  Lipid Profile:  total cholesterol: 173, LDL: 118, HDL: 30   HgA1c: 7.8  TSH: Thyroid Stimulating Hormone, Serum: 2.070 uIU/mL (02-12 @ 20:43)  PT/INR - ( 12 Feb 2023 12:00 )   PT: 13.7 sec;   INR: 1.15     PTT - ( 13 Feb 2023 03:40 )  PTT:66.4 sec

## 2023-02-13 NOTE — PROGRESS NOTE ADULT - PROBLEM SELECTOR PLAN 3
You might feel 'gassy' or "crampy'' for a few hours. This is because air was put into the stomach during the procedure. However, if you have continued abdominal (stomach) pain or swelling, contact your doctor right away. No reported hx of Afib, presented alternating between NSR and Afib w/ RVR up to HR 140s.     CHADVASC 3.  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

## 2023-02-13 NOTE — PROGRESS NOTE ADULT - PROBLEM SELECTOR PLAN 4
Hx of paranoid delusions including "they put sedatives in my medicine from the pharmacy", "there is a man who bangs around my apartment", and "all this started when I had an device placed by my dentist in 2005, maybe it's monitoring my brain or tracking me I don't know."  -Na 130 c/w discharge on prior admission, denies ETOH/illicit drug use, no evidence of metabolic encephalopathy  -Low BUN, Hyponatremia and Hypokalemia all c/w poor PO intake  -No diagnosed psych hx per pt and previously refused to be evaluated but is now amenable  -UTox ordered, f/u  -Consult psych in AM Patient with questionable afib when admitted, however, upon closer look to EKG, patient w/ PACs  - No need for A/C at this time   - Telemetry monitoring Hx of paranoid delusions including "they put sedatives in my medicine from the pharmacy", "there is a man who bangs around my apartment", and "all this started when I had an device placed by my dentist in 2005, maybe it's monitoring my brain or tracking me I don't know."  - Na 130 c/w discharge on prior admission, denies ETOH/illicit drug use, no evidence of metabolic encephalopathy  - Low BUN, Hyponatremia and Hypokalemia all c/w poor PO intake  - No diagnosed psych hx per pt and previously refused to be evaluated but is now amenable  - Utox negative  - Anurag consulted, f/u recs

## 2023-02-13 NOTE — PATIENT PROFILE ADULT - FALL HARM RISK - HARM RISK INTERVENTIONS

## 2023-02-13 NOTE — DIETITIAN INITIAL EVALUATION ADULT - PROBLEM SELECTOR PROBLEM 4
[FreeTextEntry1] : Bruit of left carotid artery (785.9) (R09.89)\par Essential hypertension (401.9) (I10)\par Non-rheumatic mitral regurgitation (424.0) (I34.0)\par AODM controlled on DIET ALONE \par Bp controlled on meds \par \par add dexilant \par GI refer Dr Reynolds.n \par check LDL on statin tiw zocor 20 mg \par Ldl improved on statin \par recent hgaic 6.8\par 
Paranoid delusion
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
## 2023-02-13 NOTE — DIETITIAN INITIAL EVALUATION ADULT - OTHER INFO
63 y/o Female recent former 15-20 pack-year smoker (quit weeks ago) w/ PMHx of non-compliance w/ meds 2/2 paranoid delusions (believes pharmacy puts sedatives in her meds, there is a man banging around my building, and received a tooth implant in 2005 which "monitors her brain" which began all of these symptoms), breast CA , HTN, HLD, T2DM, hypothyroidism and hyponatremia, who initially presented to [facility] ED for evaluation of a fall vs syncope.    RDN consulted for initial nutrition assessment with concern for decreased PO intake for greater than 72 hrs. Pt in care x2 attempts, pt out of room x2 attempts. Unable to gather NFPE or nutrition hx at this time; gather as able at f/u. Documented wt change of -23.3 kg (23.3%) since previous admission wt of 99.8kg on 14 Nov. 2022; confirm wt change at f/u as able. Reportedly pt with extremely limited PO intake iso unable properly care for self. Pt with noted noncompliance with Rx and care at home; BH following. No other reports GI distress or further nutritional concerns. RDN will continue to reassess, intervene, and monitor as appropriate.     Pain: 0 per chart review   GI: Abdomen ND/NT, +BS x4, LBM PTA   Skin: MASD B/L breasts, no edema noted

## 2023-02-14 ENCOUNTER — TRANSCRIPTION ENCOUNTER (OUTPATIENT)
Age: 63
End: 2023-02-14

## 2023-02-14 DIAGNOSIS — I25.10 ATHEROSCLEROTIC HEART DISEASE OF NATIVE CORONARY ARTERY WITHOUT ANGINA PECTORIS: ICD-10-CM

## 2023-02-14 LAB
ALBUMIN SERPL ELPH-MCNC: 2.9 G/DL — LOW (ref 3.3–5)
ALP SERPL-CCNC: 64 U/L — SIGNIFICANT CHANGE UP (ref 40–120)
ALT FLD-CCNC: 5 U/L — LOW (ref 10–45)
ANION GAP SERPL CALC-SCNC: 13 MMOL/L — SIGNIFICANT CHANGE UP (ref 5–17)
AST SERPL-CCNC: 10 U/L — SIGNIFICANT CHANGE UP (ref 10–40)
BASOPHILS # BLD AUTO: 0.08 K/UL — SIGNIFICANT CHANGE UP (ref 0–0.2)
BASOPHILS NFR BLD AUTO: 0.7 % — SIGNIFICANT CHANGE UP (ref 0–2)
BILIRUB SERPL-MCNC: 0.5 MG/DL — SIGNIFICANT CHANGE UP (ref 0.2–1.2)
BUN SERPL-MCNC: 7 MG/DL — SIGNIFICANT CHANGE UP (ref 7–23)
CALCIUM SERPL-MCNC: 7.9 MG/DL — LOW (ref 8.4–10.5)
CHLORIDE SERPL-SCNC: 92 MMOL/L — LOW (ref 96–108)
CO2 SERPL-SCNC: 27 MMOL/L — SIGNIFICANT CHANGE UP (ref 22–31)
CREAT SERPL-MCNC: 0.83 MG/DL — SIGNIFICANT CHANGE UP (ref 0.5–1.3)
EGFR: 79 ML/MIN/1.73M2 — SIGNIFICANT CHANGE UP
EOSINOPHIL # BLD AUTO: 0.16 K/UL — SIGNIFICANT CHANGE UP (ref 0–0.5)
EOSINOPHIL NFR BLD AUTO: 1.4 % — SIGNIFICANT CHANGE UP (ref 0–6)
GLUCOSE BLDC GLUCOMTR-MCNC: 132 MG/DL — HIGH (ref 70–99)
GLUCOSE BLDC GLUCOMTR-MCNC: 134 MG/DL — HIGH (ref 70–99)
GLUCOSE BLDC GLUCOMTR-MCNC: 135 MG/DL — HIGH (ref 70–99)
GLUCOSE BLDC GLUCOMTR-MCNC: 245 MG/DL — HIGH (ref 70–99)
GLUCOSE SERPL-MCNC: 134 MG/DL — HIGH (ref 70–99)
HCT VFR BLD CALC: 37.4 % — SIGNIFICANT CHANGE UP (ref 34.5–45)
HGB BLD-MCNC: 12.5 G/DL — SIGNIFICANT CHANGE UP (ref 11.5–15.5)
IMM GRANULOCYTES NFR BLD AUTO: 0.4 % — SIGNIFICANT CHANGE UP (ref 0–0.9)
LYMPHOCYTES # BLD AUTO: 29.1 % — SIGNIFICANT CHANGE UP (ref 13–44)
LYMPHOCYTES # BLD AUTO: 3.33 K/UL — HIGH (ref 1–3.3)
MAGNESIUM SERPL-MCNC: 1.8 MG/DL — SIGNIFICANT CHANGE UP (ref 1.6–2.6)
MCHC RBC-ENTMCNC: 27.4 PG — SIGNIFICANT CHANGE UP (ref 27–34)
MCHC RBC-ENTMCNC: 33.4 GM/DL — SIGNIFICANT CHANGE UP (ref 32–36)
MCV RBC AUTO: 81.8 FL — SIGNIFICANT CHANGE UP (ref 80–100)
MONOCYTES # BLD AUTO: 0.72 K/UL — SIGNIFICANT CHANGE UP (ref 0–0.9)
MONOCYTES NFR BLD AUTO: 6.3 % — SIGNIFICANT CHANGE UP (ref 2–14)
NEUTROPHILS # BLD AUTO: 7.1 K/UL — SIGNIFICANT CHANGE UP (ref 1.8–7.4)
NEUTROPHILS NFR BLD AUTO: 62.1 % — SIGNIFICANT CHANGE UP (ref 43–77)
NRBC # BLD: 0 /100 WBCS — SIGNIFICANT CHANGE UP (ref 0–0)
PLATELET # BLD AUTO: 541 K/UL — HIGH (ref 150–400)
POTASSIUM SERPL-MCNC: 3.4 MMOL/L — LOW (ref 3.5–5.3)
POTASSIUM SERPL-SCNC: 3.4 MMOL/L — LOW (ref 3.5–5.3)
PROT SERPL-MCNC: 6.6 G/DL — SIGNIFICANT CHANGE UP (ref 6–8.3)
RBC # BLD: 4.57 M/UL — SIGNIFICANT CHANGE UP (ref 3.8–5.2)
RBC # FLD: 14.1 % — SIGNIFICANT CHANGE UP (ref 10.3–14.5)
SODIUM SERPL-SCNC: 132 MMOL/L — LOW (ref 135–145)
WBC # BLD: 11.44 K/UL — HIGH (ref 3.8–10.5)
WBC # FLD AUTO: 11.44 K/UL — HIGH (ref 3.8–10.5)

## 2023-02-14 PROCEDURE — 99232 SBSQ HOSP IP/OBS MODERATE 35: CPT

## 2023-02-14 RX ORDER — NYSTATIN CREAM 100000 [USP'U]/G
1 CREAM TOPICAL EVERY 12 HOURS
Refills: 0 | Status: DISCONTINUED | OUTPATIENT
Start: 2023-02-14 | End: 2023-02-23

## 2023-02-14 RX ORDER — CARVEDILOL PHOSPHATE 80 MG/1
12.5 CAPSULE, EXTENDED RELEASE ORAL EVERY 12 HOURS
Refills: 0 | Status: DISCONTINUED | OUTPATIENT
Start: 2023-02-14 | End: 2023-02-15

## 2023-02-14 RX ORDER — MAGNESIUM OXIDE 400 MG ORAL TABLET 241.3 MG
400 TABLET ORAL ONCE
Refills: 0 | Status: COMPLETED | OUTPATIENT
Start: 2023-02-14 | End: 2023-02-14

## 2023-02-14 RX ORDER — LISINOPRIL 2.5 MG/1
5 TABLET ORAL ONCE
Refills: 0 | Status: COMPLETED | OUTPATIENT
Start: 2023-02-14 | End: 2023-02-14

## 2023-02-14 RX ORDER — LISINOPRIL 2.5 MG/1
10 TABLET ORAL DAILY
Refills: 0 | Status: DISCONTINUED | OUTPATIENT
Start: 2023-02-15 | End: 2023-02-15

## 2023-02-14 RX ORDER — POTASSIUM CHLORIDE 20 MEQ
40 PACKET (EA) ORAL ONCE
Refills: 0 | Status: COMPLETED | OUTPATIENT
Start: 2023-02-14 | End: 2023-02-14

## 2023-02-14 RX ORDER — POTASSIUM CHLORIDE 20 MEQ
20 PACKET (EA) ORAL ONCE
Refills: 0 | Status: COMPLETED | OUTPATIENT
Start: 2023-02-14 | End: 2023-02-14

## 2023-02-14 RX ADMIN — CARVEDILOL PHOSPHATE 12.5 MILLIGRAM(S): 80 CAPSULE, EXTENDED RELEASE ORAL at 23:34

## 2023-02-14 RX ADMIN — NYSTATIN CREAM 1 APPLICATION(S): 100000 CREAM TOPICAL at 18:26

## 2023-02-14 RX ADMIN — SPIRONOLACTONE 25 MILLIGRAM(S): 25 TABLET, FILM COATED ORAL at 06:01

## 2023-02-14 RX ADMIN — NYSTATIN CREAM 1 APPLICATION(S): 100000 CREAM TOPICAL at 06:02

## 2023-02-14 RX ADMIN — MAGNESIUM OXIDE 400 MG ORAL TABLET 400 MILLIGRAM(S): 241.3 TABLET ORAL at 09:32

## 2023-02-14 RX ADMIN — Medication 20 MILLIEQUIVALENT(S): at 09:32

## 2023-02-14 RX ADMIN — LISINOPRIL 5 MILLIGRAM(S): 2.5 TABLET ORAL at 06:01

## 2023-02-14 RX ADMIN — LISINOPRIL 5 MILLIGRAM(S): 2.5 TABLET ORAL at 12:12

## 2023-02-14 RX ADMIN — ENOXAPARIN SODIUM 40 MILLIGRAM(S): 100 INJECTION SUBCUTANEOUS at 18:26

## 2023-02-14 RX ADMIN — Medication 81 MILLIGRAM(S): at 06:03

## 2023-02-14 RX ADMIN — Medication 40 MILLIEQUIVALENT(S): at 12:12

## 2023-02-14 RX ADMIN — CARVEDILOL PHOSPHATE 12.5 MILLIGRAM(S): 80 CAPSULE, EXTENDED RELEASE ORAL at 11:26

## 2023-02-14 NOTE — PHYSICAL THERAPY INITIAL EVALUATION ADULT - PATIENT/FAMILY/SIGNIFICANT OTHER GOALS STATEMENT, PT EVAL
[facility]   Daily Progress Note    Admit Date:  2/21/2021  HPI:    Chief Complaint   Patient presents with    Seizures     Patient comes into ED via [facility] with c/o by EMS of seizure like activity. EMS states once they were getting him into the truck he started to have tonic clonic like activity and 5mg of intranasal Versed was given. Interval history: Sal Yang is being followed for elevated troponin. Admitted after multiple seizures in post-ictal state, required intubation for mental status and respiratory failure. Echo showed EF 20-25%. Subjective:  Mr. Ney Watson remains intubated and on heavy sedation.    Weaned off of levophed this am.     Objective:   BP (!) 90/57   Pulse 80   Temp 99.2 °F (37.3 °C) (Bladder)   Resp 18   Ht 5' 11\" (1.803 m)   Wt 132 lb 0.9 oz (59.9 kg)   SpO2 96%   BMI 18.42 kg/m²       Intake/Output Summary (Last 24 hours) at 2/23/2021 0135  Last data filed at 2/23/2021 0800  Gross per 24 hour   Intake 2767 ml   Output 4250 ml   Net -1483 ml       NYHA: IV    Physical Exam:  General:  Intubated and sedated   Skin:  Warm and dry  Neck:  Line in place  Chest:  Clear to auscultation, no wheezes/rhonchi/rales  Telemetry: NSR rate 70-80's  Cardiovascular:  RRR S1S2, no m/r/g   Abdomen:  Soft, nontender, +bowel sounds  Extremities:  No  bilateral lower extremity edema, hands and feet warm     Medications:    insulin lispro  0-6 Units Subcutaneous Q4H    methadone  40 mg Per NG tube Daily    ampicillin-sulbactam  3,000 mg Intravenous Q6H    lactobacillus  1 capsule Per NG tube Daily with breakfast    sodium chloride flush  10 mL Intravenous 2 times per day    famotidine (PEPCID) injection  20 mg Intravenous BID    chlorhexidine  15 mL Mouth/Throat BID    carboxymethylcellulose PF  1 drop Both Eyes 6 times per day    enoxaparin  40 mg Subcutaneous Daily    levetiracetam  1,000 mg Intravenous Q12H      norepinephrine 2 mcg/min (02/23/21 0851)   [employer] DOBUTamine 2.5 mcg/kg/min (02/22/21 1145)    dextrose      propofol 50 mcg/kg/min (02/23/21 0648)    midazolam 10 mg/hr (02/22/21 2333)    fentaNYL (SUBLIMAZE) infusion 250 mcg/hr (02/23/21 0835)    dexmedetomidine 0.4 mcg/kg/hr (02/23/21 0730)       Lab Data:  CBC:   Recent Labs     02/21/21  2141 02/22/21  0218 02/22/21  0815 02/23/21  0410   WBC 31.2*  --  30.9* 14.2*   HGB 14.7 13.7 14.2 12.2*     --  265 144     BMP:    Recent Labs     02/21/21  2141 02/22/21  0815 02/22/21  1749 02/23/21  0410   * 133*  --  134*   K 4.8 5.7* 4.6 4.0   CO2 12* 23  --  25   BUN 10 12  --  9   CREATININE 1.0 0.7*  --  <0.5*     INR:    Recent Labs     02/22/21  0700 02/22/21  0815 02/23/21  0410   INR NA* 1.09 1.22*     BNP:  No results for input(s): PROBNP in the last 72 hours. No results found for: LVEF, LVEFMODE    Testing:  Echo: 1/31/2020 [facility]  - Left ventricle: The cavity size is normal. Wall thickness is normal. Systolic function was normal.    The estimated ejection fraction was in the range of 55% to 60%. Wall motion was normal; there were no regional wall motion abnormalities. Left ventricular diastolic function parameters were normal.  - Right ventricle: Systolic function was normal by objective interpretation. TAPSE: 2.7cm. Tricuspid    annular systolic velocity: 03VL/T.  - Pericardium, extracardiac: There is a left pleural effusion. CARDIAC CTA: 3/10/2020  FINDINGS:  The left ventricle appears normal in size.  The right ventricle appears normal in size.  The atria are normal in size.  The left atrium receives two right and two left pulmonary veins without evidence of stenosis.  The left atrial appendage is chicken wing shaped without evidence of thrombus.  The aorta is normal in size.  The pulmonary artery is incompletely visualized.  The IVC is normal in size.  The coronary sinus is not well visualized.   CORONARY ARTERIES:  The coronary arteries have normal origins and proximal courses.  The coronary system is right dominant.  The SA node artery originates from the right coronary artery. LEFT:  The left main artery is a moderate size vessel.  The left main artery has no significant disease. The left anterior descending artery is a moderate size vessel.  The LAD has no significant disease. Sharon Going is a small, area of shallow bridging in the mid LAD.  There are three small and patent diagonal arteries.    The left circumflex artery is a moderate size vessel.  The LCx has no significant disease.  The first obtuse marginal artery is a moderate size, branching vessel that supplies a significant portion of the lateral wall.  The first obtuse marginal artery has no significant disease.  The second obtuse marginal artery has no significant disease.  The distal LCx is a small vessel that terminates in a small and patent OM. RIGHT:  The right coronary artery is a moderate size vessel.  The RCA has no significant disease.  The PDA is patent without significant disease in the visualized portion.  The PLV branch is small and patent.  The conus artery has an independent origin. PERICARDIUM:  There is no abnormal pericardial calcification. There is no pericardial effusion. Echo 2/21/21  Summary   -- Left ventricular systolic function is severely reduced with a visually   estimated ejection fraction of 20-25%. EF estimated by Dooley's method at   25%. The left ventricle is normal in size with normal wall thickness. Only   all basal wall segments moves well, while the rest wall segments akinesis,   consistent with takotsubo syndrome. Normal diastolic function. -- Right ventricular systolic function is reduced. -- Mitral valve leaflets appear mildly myxomatous but opens adequately. Mild   mitral regurgitation. Inadequate tricuspid valve regurgitation to estimate systolic pulmonary   artery pressure.     Active Problems:    Seizure [facility])    Status epilepticus ([address]    NSTEMI (non-ST elevated myocardial infarction) ([facility]    Abnormal ECG    Takotsubo cardiomyopathy    Tobacco abuse    History of drug use    Acute respiratory failure with hypoxia (HCC)    Aspiration pneumonia of both lower lobes (HCC)    Septic shock (HCC)    Polydrug abuse (HCC)    Elevated troponin    Acute encephalopathy    Partial idiopathic epilepsy with seizures of localized onset, intractable, with status epilepticus ([facility]  Resolved Problems:    * No resolved hospital problems. *      Assessment:  Elevated troponin/NSTEMI due to stress cardiomyopathy   Abnormal EKG- SVT with LBBB  Cardiomyopathy stress induced. LVEF 20-25%  Septic shock- Fever Tmax 101.4  Hx of tobacco abuse  Hx of TBI, Right temporal seizures since 2019; followed by  neuro  Hep C  Mood Disorder   IVDU- on methadone  anemia    Plan:  Continue supportive care   Add on troponin- improving this am  Dobutamine 2.5mcg/kg/min - do not titrate  Off of levophed   Off of heparin infusion  Repeat EKG today   Discussed with Dr. Julien Garcia no plans for cardiac cath at this time. Discussed with critical care team     Critical care time spent reviewing labs/films, examining patient, collaborating with other physicians but excluding procedures for life threatening organ failure is 34 minutes, thus far today.     Mundo Gee CNP, 2/23/2021, 11:15 AM to feel stronger

## 2023-02-14 NOTE — PHYSICAL THERAPY INITIAL EVALUATION ADULT - GAIT DEVIATIONS NOTED, PT EVAL
patient with mildly unsteady gait, no LOB, shuffling steps requiring frequent standing rest breaks/decreased jill/decreased step length

## 2023-02-14 NOTE — PROGRESS NOTE ADULT - PROBLEM SELECTOR PLAN 9
total cholesterol: 173, LDL: 118, HDL: 30  - Start Atorvastatin 40 mg daily      F: none   E: K >4, Mg > 2  N: DASH, TLC  Dvt: lovenox   Dispo: pending clinical progression   PT consulted - f/u recs  Case d/w Dr. Roberto total cholesterol: 173, LDL: 118, HDL: 30  - Start Atorvastatin 40 mg daily      F: none   E: K >4, Mg > 2  N: DASH, TLC  Dvt: lovenox   Dispo: pending clinical progression   PT consulted - [facility]   Case d/w Dr. Roberto

## 2023-02-14 NOTE — PROGRESS NOTE ADULT - ASSESSMENT
Patient is a 63 y/o Female recent former 15-20 pack-year smoker (quit weeks ago) w/ PMHx of non-compliance w/ meds 2/2 paranoid delusions, HTN, HLD, DM, hypothyroidism, hyponatremia, breast cancer, who presented to [facility] ED on 2/12/23 for evaluation of a fall vs syncope.  Pt domiciled alone, walks w/ walker, and reports that this morning she was in her kitchen and fell to the ground knocking over her kitchen garbage can, but does not remember falling or if she tripped or got dizzy. Pt also endorses poor PO intake recently for unspecified reasons. Of note, pt had prior admission in 11/2022 p/w generalized weakness and poor PO intake w/ subsequent MICU admission for severe hyponatremia. Patient now admitted to cardiology/telemetry for further management of syncope vs. fall and elevated troponin. Patient underwent CCTA on 02/13/2023 that showed severe disease in mLAD and RPLS. Due to patient's poor medication compliance, decision made to treat patient medically. Patient awaiting d/c to [facility].

## 2023-02-14 NOTE — PROGRESS NOTE ADULT - PROBLEM SELECTOR PLAN 3
#RESOLVED   Patient w/ hx of hyponatremia, admitted to 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

## 2023-02-14 NOTE — PHYSICAL THERAPY INITIAL EVALUATION ADULT - ADDITIONAL COMMENTS
Patient reports she lives alone in elevator apartment building with 4 URIEL. PTA patient was ambulating with rollator however reports not feeling comfortable leaving her house 2/2 limited mobility. She reports she has been unable to bathe herself recently however did not report how long it has been. Patient has walk in shower

## 2023-02-14 NOTE — PROGRESS NOTE ADULT - PROBLEM SELECTOR PLAN 2
ECHO as above  - CCTA 02/13/2023: calcium score is severe at 889 Agatson units, which is at the 99 percentile, adjusted for age, gender, and race. Severe mLAD and RPL stenosis. Moderate LCX.   - Given patient's poor medication compliance - will treat medically  - Continue Aspirin 81 mg daily, Atorvastatin 40 mg daily

## 2023-02-14 NOTE — DISCHARGE NOTE PROVIDER - NSDCCPCAREPLAN_GEN_ALL_CORE_FT
PRINCIPAL DISCHARGE DIAGNOSIS  Diagnosis: Fall  Assessment and Plan of Treatment: You were admitted for further workup of fall at your home. It appears that you fell, likely due to being dehydrated and having poor oral intake. During your admission, you did not have an arrythmias or irregular heat beats that would cause you to syncope. Please follow up with ____ for furhter management.      SECONDARY DISCHARGE DIAGNOSES  Diagnosis: Hypertension  Assessment and Plan of Treatment: You have a diagnosis of Hypertension or elevated blood pressure. Please continue taking your medications as listed to keep your blood pressure controlled. In addition, there are multiple lifestyle modifications that have been proven to lower blood pressure: maintaining a healthy body weight, engaging in regular physical activity for at least 30 minutes per day on most days, and consuming a diet rich in fruits, vegetables, and low-fat dairy products with a reduced amount of total and saturated fats and sodium. You were started on Lisinopril 10 mg daily, Spironolactone 25 mg daily, and Coreg 12.5 mg twice daily.   For blood pressures at home that are too high or low please see your Doctor or go to the Emergency Room as necessary.      Diagnosis: CAD (coronary artery disease)  Assessment and Plan of Treatment: Your CT of your heart showed that you have severe disease in your LEFT ANTERIOR DESCENDING ARTERY and RIGHT POSTERIOLATERAL ARTERY. The decision was made to conservatively manage it with Aspirin 81 mg daily and Atorvastatin 40 mg daily.   SEEK IMMEDIATE MEDICAL CARE IF YOU HAVE ANY OF THE FOLLOWING SYMPTOMS: worsening chest pain, racing heart beat, unexplained jaw/neck/back pain, severe abdominal pain, shortness of breath, dizziness or lightheadedness, fainting, sweaty or clammy skin, vomiting, or coughing up blood. These symptoms may represent a serious problem that is an emergency. Do not wait to see if the symptoms will go away. Get medical help right away. Call 911 and do not drive yourself to the hospital.      Diagnosis: Hyperlipidemia  Assessment and Plan of Treatment: Your LDL is 118 and your goal LDL is less than 70. LDL is also known as "bad cholesterol" because it takes cholesterol to your arteries, where it may collect in artery walls. Too much cholesterol in your arteries may lead to a buildup of plaque known as atherosclerosis and contribute to heart disease. Please continue:  Atorvastatin (LIPITOR) 40 mg daily. Appropriate refills were sent to your preferred pharmacy. Please follow-up with your cardiologist for further management.       PRINCIPAL DISCHARGE DIAGNOSIS  Diagnosis: Fall  Assessment and Plan of Treatment: You were admitted for further workup of fall at your home. It appears that you fell, likely due to being dehydrated and having poor oral intake. During your admission, you did not have an arrythmias or irregular heat beats that would cause you to syncope. Please follow up with your primary care and your cardiologist Dr. Roberto for furhter management.      SECONDARY DISCHARGE DIAGNOSES  Diagnosis: Hypertension  Assessment and Plan of Treatment: You have a diagnosis of Hypertension or elevated blood pressure. Please continue taking your medications as listed to keep your blood pressure controlled. In addition, there are multiple lifestyle modifications that have been proven to lower blood pressure: maintaining a healthy body weight, engaging in regular physical activity for at least 30 minutes per day on most days, and consuming a diet rich in fruits, vegetables, and low-fat dairy products with a reduced amount of total and saturated fats and sodium. You were started on Lisinopril 30 mg daily, Spironolactone 25 mg daily, and Coreg 25 mg twice daily.   For blood pressures at home that are too high or low please see your Doctor or go to the Emergency Room as necessary.      Diagnosis: CAD (coronary artery disease)  Assessment and Plan of Treatment: Your CT of your heart showed that you have severe disease in your LEFT ANTERIOR DESCENDING ARTERY and RIGHT POSTERIOLATERAL ARTERY. The decision was made to conservatively manage it with Aspirin 81 mg daily and Atorvastatin 40 mg daily.   SEEK IMMEDIATE MEDICAL CARE IF YOU HAVE ANY OF THE FOLLOWING SYMPTOMS: worsening chest pain, racing heart beat, unexplained jaw/neck/back pain, severe abdominal pain, shortness of breath, dizziness or lightheadedness, fainting, sweaty or clammy skin, vomiting, or coughing up blood. These symptoms may represent a serious problem that is an emergency. Do not wait to see if the symptoms will go away. Get medical help right away. Call 911 and do not drive yourself to the hospital.      Diagnosis: Hyperlipidemia  Assessment and Plan of Treatment: Your LDL is 118 and your goal LDL is less than 70. LDL is also known as "bad cholesterol" because it takes cholesterol to your arteries, where it may collect in artery walls. Too much cholesterol in your arteries may lead to a buildup of plaque known as atherosclerosis and contribute to heart disease. Please continue:  Atorvastatin (LIPITOR) 40 mg daily. Appropriate refills were sent to your preferred pharmacy. Please follow-up with your cardiologist for further management.      Diagnosis: DM (diabetes mellitus)  Assessment and Plan of Treatment: Your Hemoglobin A1C is 7.8. Target goal for hemoglobin A1C is <7. You have been restarted on your Metformin 500 mg daily and Glimepiride 1 mg daily. Monitor blood glucose levels throughout the day before meals and at bedtime. Record blood sugars and bring to outpatient providers appointment in order to be reviewed by your doctor for management modifications. If your sugars are more than 400 or less than 70 you should contact your PCP immediately. Monitor for signs/symptoms of low blood glucose, such as, dizziness, altered mental status, or cool/clammy skin. In addition, monitor for signs/symptoms of high blood glucose, such as, feeling hot, dry, fatigued, or with increased thirst/urination. Make regular podiatry appointments in order to have feet checked for wounds and uncontrolled toe nail growth to prevent infections, as well as, appointments with an ophthalmologist to monitor your vision.     PRINCIPAL DISCHARGE DIAGNOSIS  Diagnosis: Fall  Assessment and Plan of Treatment: You were admitted for further workup of fall at your home. It appears that you fell, likely due to being dehydrated and having poor oral intake. During your admission, you did not have an arrythmias or irregular heat beats that would cause you to syncope; however, you were found with Orthostatic Hypotension will might have contributed to you falling  --- Please follow up with your primary care and your cardiologist Dr. Roberto for furhter management.      SECONDARY DISCHARGE DIAGNOSES  Diagnosis: Hypertension  Assessment and Plan of Treatment: You have a diagnosis of Hypertension or elevated blood pressure. Please continue taking your medications as listed to keep your blood pressure controlled. In addition, there are multiple lifestyle modifications that have been proven to lower blood pressure: maintaining a healthy body weight, engaging in regular physical activity for at least 30 minutes per day on most days, and consuming a diet rich in fruits, vegetables, and low-fat dairy products with a reduced amount of total and saturated fats and sodium.   --CONTINUE LISINOPRIL 30MG DAILY  --CONTINUE  SPIRINOLACTONE 25MG DAILY   --COREG 25MG TWICE DAILY    --For blood pressures at home that are too high or low please see your Doctor or go to the Emergency Room as necessary.      Diagnosis: CAD (coronary artery disease)  Assessment and Plan of Treatment: Your CT of your heart showed that you have severe disease in your LEFT ANTERIOR DESCENDING ARTERY and RIGHT POSTERIOLATERAL ARTERY. The decision was made to conservatively manage it with Aspirin 81 mg daily and Atorvastatin 40 mg daily.   SEEK IMMEDIATE MEDICAL CARE IF YOU HAVE ANY OF THE FOLLOWING SYMPTOMS: worsening chest pain, racing heart beat, unexplained jaw/neck/back pain, severe abdominal pain, shortness of breath, dizziness or lightheadedness, fainting, sweaty or clammy skin, vomiting, or coughing up blood. These symptoms may represent a serious problem that is an emergency. Do not wait to see if the symptoms will go away. Get medical help right away. Call 911 and do not drive yourself to the hospital.      Diagnosis: Hyperlipidemia  Assessment and Plan of Treatment: Your LDL is 118 and your goal LDL is less than 70. LDL is also known as "bad cholesterol" because it takes cholesterol to your arteries, where it may collect in artery walls. Too much cholesterol in your arteries may lead to a buildup of plaque known as atherosclerosis and contribute to heart disease. Please continue:  Atorvastatin (LIPITOR) 40 mg daily. Appropriate refills were sent to your preferred pharmacy. Please follow-up with your cardiologist for further management.      Diagnosis: DM (diabetes mellitus)  Assessment and Plan of Treatment: Your Hemoglobin A1C is 7.8. Target goal for hemoglobin A1C is <7. You have been restarted on your Metformin 500 mg daily and Glimepiride 1 mg daily. Monitor blood glucose levels throughout the day before meals and at bedtime. Record blood sugars and bring to outpatient providers appointment in order to be reviewed by your doctor for management modifications. If your sugars are more than 400 or less than 70 you should contact your PCP immediately. Monitor for signs/symptoms of low blood glucose, such as, dizziness, altered mental status, or cool/clammy skin. In addition, monitor for signs/symptoms of high blood glucose, such as, feeling hot, dry, fatigued, or with increased thirst/urination. Make regular podiatry appointments in order to have feet checked for wounds and uncontrolled toe nail growth to prevent infections, as well as, appointments with an ophthalmologist to monitor your vision.     PRINCIPAL DISCHARGE DIAGNOSIS  Diagnosis: Fall  Assessment and Plan of Treatment: You were admitted for further workup of fall at your home. It appears that you fell, likely due to being dehydrated and having poor oral intake. During your admission, you did not have an arrythmias or irregular heat beats that would cause you to syncope; however, you were found with Orthostatic Hypotension will might have contributed to you falling  --- Please follow up with your primary care and your cardiologist Dr. Roberto for furhter management.      SECONDARY DISCHARGE DIAGNOSES  Diagnosis: Hypertension  Assessment and Plan of Treatment: You have a diagnosis of Hypertension or elevated blood pressure. Please continue taking your medications as listed to keep your blood pressure controlled.   --CONTINUE LISINOPRIL 30MG DAILY  --CONTINUE  SPIRINOLACTONE 25MG DAILY   --COREG 25MG TWICE DAILY    --For blood pressures at home that are too high or low please see your Doctor or go to the Emergency Room as necessary.      Diagnosis: CAD (coronary artery disease)  Assessment and Plan of Treatment: You were found with a grossly abnormal  CT of your heart showed that you have severe disease in your LEFT ANTERIOR DESCENDING ARTERY and RIGHT POSTERIOLATERAL ARTERY. The decision was made to conservatively manage it with Aspirin 81 mg daily and Atorvastatin 40 mg daily.   Please return if symtpms of severe Chest Pain, Shortness of breath or palpitations occurs      Diagnosis: Hyperlipidemia  Assessment and Plan of Treatment: Your LDL is 118 and your goal LDL is less than 70.    --Please continue Atorvastatin 40mg daily    Diagnosis: DM (diabetes mellitus)  Assessment and Plan of Treatment: Your Hemoglobin A1C is 7.8. Target goal for hemoglobin A1C is <7.   --You have been restarted on your Metformin 500 mg daily and Glimepiride 1 mg daily    Diagnosis: Cellulitis  Assessment and Plan of Treatment: You were seen by Podiatry and underwent of debridement of your toes.  You  were found with cellulitis on your left 2nd toe and was started on Cephalexin 500mg every 6 hours for 10 days  to be completed on 2/28/23  --Please follow up with PCP for referral to an outpatient Podiatrist if needed.    Diagnosis: Paranoid delusion  Assessment and Plan of Treatment: You were evaluated by Psychiatry for concern sfor paranoid delusions. No  psychiatric intervention needed at this time, you have REMAINED COOPERATIVE AND AGREEABLE TO CARE, HAS NOT EXHIBITED AGITATION.   -- Per psych, you are recommended follow up with outpatient psych for further therapy if needed.

## 2023-02-14 NOTE — PHYSICAL THERAPY INITIAL EVALUATION ADULT - LEVEL OF INDEPENDENCE, REHAB EVAL
Hospital Medicine Daily Progress Note    Date of Service  9/14/2021    Chief Complaint  Fall and not able to take care of himself.    Hospital Course      86-year-old male with history of dementia presented 4/3 with fall on admission, imaging showed subarachnoid hemorrhage, C6-7 ligamentous injury. GSC was 10.  Patient was evaluated by neurosurgery, no surgical intervention was recommended.  MRI brain from 4/3/2021 showed scattered subarachnoid hemorrhage in the bilateral frontal, temporal and parietal sulci, small and punctate acute infarcts involving bilateral high anterior frontal lobes, chronic bilateral frontal subdural hygromas measuring 6 mm on the right and 3 mm on the left with no mass or midline shift.     Patient was not able to provide history.  He was confused, per chart he thought he was still at a casino, he was found with bedbugs and cockroaches on him.  He had no known friends or family.  Bioethics consult was placed.     Ethics committee meeting was held on 4/15/2021. Initially core track was placed, replaced with PEG tube this patient was pulling core track out.  Now tolerating liquid eyes and mildly thickened diet.  Speech therapy are following.     Guardianship was approved on 7/9/2021.  Legal guardian is looking into patient's finances to help with disposition needs.  Case has been escalated to executive leadership.    Interval Problem Update  -Evaluated examined the patient at bedside, patient is alert and oriented x2 likely has baseline due to dementia  -Labs evaluated and no acute finding  -Working for placement likely long-term       I have personally seen and examined the patient at bedside. I discussed the plan of care with bedside RN.    Consultants/Specialty  neurosurgery and ethics    Code Status  DNAR/DNI      Disposition  Patient is medically cleared.   Anticipate discharge to Postacute placement.  Per OT needs facility assist with ADLs and physical transfers.  Versus long-term group  home.  I have placed the appropriate orders for post-discharge needs.    Review of Systems  Review of Systems   All other systems reviewed and are negative.       Physical Exam  Temp:  [36.1 °C (96.9 °F)-37 °C (98.6 °F)] 36.4 °C (97.6 °F)  Pulse:  [63-85] 85  Resp:  [16-18] 18  BP: (103-123)/() 105/71  SpO2:  [96 %-98 %] 96 %    Physical Exam  Vitals and nursing note reviewed.   Constitutional:       Appearance: He is not ill-appearing (Chronic).      Comments: Thin   HENT:      Head: Normocephalic and atraumatic.      Comments: Temporal wasting  Eyes:      General:         Right eye: No discharge.         Left eye: No discharge.      Conjunctiva/sclera: Conjunctivae normal.   Cardiovascular:      Rate and Rhythm: Normal rate and regular rhythm.      Heart sounds: Normal heart sounds.   Pulmonary:      Effort: Pulmonary effort is normal. No respiratory distress.   Abdominal:      General: Abdomen is flat. There is no distension.      Tenderness: There is no abdominal tenderness. There is no guarding.   Skin:     General: Skin is warm and dry.   Neurological:      Mental Status: He is alert. He is disoriented.      Cranial Nerves: No cranial nerve deficit.      Motor: No weakness (Similar as yeaterday).   Psychiatric:         Mood and Affect: Mood normal.      Comments: Blank facies.  Calm, eating         Fluids    Intake/Output Summary (Last 24 hours) at 9/14/2021 1552  Last data filed at 9/14/2021 1200  Gross per 24 hour   Intake 690 ml   Output 1000 ml   Net -310 ml       Laboratory  Recent Labs     09/13/21  0810 09/13/21  1243 09/14/21  1144   WBC 5.6 5.7 6.4   RBC 4.32* 4.41* 4.33*   HEMOGLOBIN 13.0* 13.2* 13.0*   HEMATOCRIT 39.4* 39.6* 39.9*   MCV 91.2 89.8 92.1   MCH 30.1 29.9 30.0   MCHC 33.0* 33.3* 32.6*   RDW 46.5 45.1 46.5   PLATELETCT 173 187 176   MPV 9.7 9.5 9.4     Recent Labs     09/13/21  0810 09/13/21  1243 09/14/21  1144   SODIUM 140 142 139   POTASSIUM 4.3 4.4 4.2   CHLORIDE 108 109 106    CO2 24 23 24   GLUCOSE 79 114* 105*   BUN 21 23* 24*   CREATININE 0.97 0.90 1.03   CALCIUM 8.9 9.1 8.8                   Imaging       Assessment/Plan  * Failure to thrive in adult- (present on admission)  Assessment & Plan  Multifactorial including dementia and age   continue nutritional support.  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  Patient was evaluated by neurosurgery.  Conservative management.    Continue atorvastatin, and Xarelto  Planned for group home versus SNF. LOAs sent.   Currently patient wants to eat and not very compliant with dysphagia diet  Palliative to reevaluate goals of care; speak to guardian  One to one sitter for eating. He is advised to chew his food, not eat too fast.      Dysphagia- (present on admission)  Assessment & Plan  Core track was initially placed, replaced with PEG tube as patient was pulling core track out.    Tolerating mildly thickened diet.    CR actually clear and procalc only mildly elevated. No leukocytosis. Observe closely for now.  One to one sitter for feeding; he is advised not to eat too fast and chew his food. On dysphagia type  diet.  Speech on board, appreciate recommendations    Severe malnutrition (HCC)- (present on admission)  Assessment & Plan  Nutrition/SLP are following.    Improving, BMI up to 18 from 15    Cervical disc disorder at C5-C6 level with myelopathy- (present on admission)  Assessment & Plan  Patient notes evaluated by neurosurgery.  Not considered a surgical candidate.  Clinically improved.    Subarachnoid hemorrhage (HCC)- (present on admission)  Assessment & Plan  Patient was evaluated by neurosurgery and admission.  Continue fall precautions and supportive care.    Goals of care, counseling/discussion- (present on admission)  Assessment & Plan  Bioethics have been involved in this case.  Patient was made DNR/DNI. Ms. Robert Faulkner ([phone]) was approved is patient's legal guardian on 7/9/2021.  Leadership has been involved in patient's case and placement.    AF (atrial fibrillation) (HCC)- (present on admission)  Assessment & Plan  Rate controlled, no need for beta-blockers.  Echo in 4/2021 showed normal ejection fraction 50% with elevated right ventricular pressure  Continue Xarelto       VTE prophylaxis: therapeutic anticoagulation with Xarelto        Interventions to be considered in all patients in order to minimize the risk of delirium.   -do not disturb patient (vitals or lab draws) between the hours of 10 PM and 6 AM.  -ideally the patient should not sleep during the day and we should avoid day time naps.   -up in chair for meals  -ambulate at least three times daily, as able  -watch for constipation  -timed voiding - ask patient is she would like to go to the bathroom q 2-3 hours, except during the do not disturb hours.   -remove all necessary lines (central lines, peripheral IVs, feeding tubes, stanley catheters)  -unless patient has shown harm to self or others I would recommend against use of restraints - either chemical or physical (antipsychotics)   -minimize polypharmacy, do not dose medication  during sleep hours     contact guard

## 2023-02-14 NOTE — PROGRESS NOTE ADULT - SUBJECTIVE AND OBJECTIVE BOX
Interventional Cardiology PA Adult Progress Note    Subjective Assessment: Patient seen and examined at bedside. Patient feels well, without any complaints at this time. Explained to patient the findings of her CCTA that they indicate that she most likely has blockages in her coronary arteries. Patient seems to have understanding and states she would be compliant with DAPT. However, upon speaking to patient's brother, patient has poor social support and difficulty performing ADLs.     ROS Negative except as per Subjective and HPI  	  MEDICATIONS:  carvedilol 12.5 milliGRAM(s) Oral every 12 hours  spironolactone 25 milliGRAM(s) Oral daily  dextrose 50% Injectable 25 Gram(s) IV Push once  dextrose 50% Injectable 12.5 Gram(s) IV Push once  dextrose 50% Injectable 25 Gram(s) IV Push once  dextrose Oral Gel 15 Gram(s) Oral once PRN  glucagon  Injectable 1 milliGRAM(s) IntraMuscular once  insulin lispro (ADMELOG) corrective regimen sliding scale   SubCutaneous three times a day before meals  insulin lispro (ADMELOG) corrective regimen sliding scale   SubCutaneous at bedtime  aspirin enteric coated 81 milliGRAM(s) Oral daily  dextrose 5%. 1000 milliLiter(s) IV Continuous <Continuous>  dextrose 5%. 1000 milliLiter(s) IV Continuous <Continuous>  enoxaparin Injectable 40 milliGRAM(s) SubCutaneous every 24 hours  influenza   Vaccine 0.5 milliLiter(s) IntraMuscular once  lactated ringers. 1000 milliLiter(s) IV Continuous <Continuous>  nystatin Powder 1 Application(s) Topical every 12 hours      [PHYSICAL EXAM:  TELEMETRY:  T(C): 36.2 (02-14-23 @ 13:00), Max: 36.7 (02-13-23 @ 18:04)  HR: 99 (02-14-23 @ 12:30) (58 - 99)  BP: 130/63 (02-14-23 @ 12:30) (130/63 - 191/79)  RR: 20 (02-14-23 @ 12:30) (16 - 20)  SpO2: 96% (02-14-23 @ 12:30) (95% - 97%)  Wt(kg): --  I&O's Summary    13 Feb 2023 07:01  -  14 Feb 2023 07:00  --------------------------------------------------------  IN: 0 mL / OUT: 1250 mL / NET: -1250 mL    14 Feb 2023 07:01  -  14 Feb 2023 15:01  --------------------------------------------------------  IN: 200 mL / OUT: 450 mL / NET: -250 mL                            Appearance: Normal, sitting in bed, NAD   HEENT:   Normal oral mucosa, PERRL, EOMI	  Neck: Supple, - JVD;  No Carotid Bruit   Cardiovascular: Normal S1 S2, No JVD, No murmurs,   Respiratory: Lungs clear to auscultation  Gastrointestinal:  Soft, Non-tender, + BS	  Skin: No rashes, No ecchymoses, No cyanosis  Extremities: Normal range of motion, No clubbing, cyanosis or edema  Vascular: Peripheral pulses palpable 2+ bilaterally  Neurologic: Non-focal  Psychiatry: A & O x 3, Mood & affect appropriate  	      DIAGNOSTIC TESTING:  [x ] Echocardiogram 02/13/2023:   1. Left ventricular systolic function is borderline reduced with a   calculated ejection fraction of 50-55% with regional wall motion   abnormalities. Basal inferoseptal segment and basal inferior segment are   abnormal. Normal motion in the remaining regions.   2. Grade I left ventricular diastolic dysfunction.   3. The right ventricle is normal in size. Right ventricular systolic   function is normal.   4. No significant valvular disease.   5. No pericardial effusion.   6. No prior echo is available for comparison.    LABS:	 	               12.5   11.44 )-----------( 541      ( 14 Feb 2023 06:02 )             37.4     02-14    132<L>  |  92<L>  |  7   ----------------------------<  134<H>  3.4<L>   |  27  |  0.83    Ca    7.9<L>      14 Feb 2023 06:02  Mg     1.8     02-14    TPro  6.6  /  Alb  2.9<L>  /  TBili  0.5  /  DBili  x   /  AST  10  /  ALT  5<L>  /  AlkPhos  64  02-14  Lipid Profile:  total cholesterol: 173, LDL: 118, HDL: 30   HgA1c: 7.8  PTT - ( 13 Feb 2023 03:40 )  PTT:66.4 sec

## 2023-02-14 NOTE — PROGRESS NOTE ADULT - PROBLEM SELECTOR PLAN 6
A1c 11.9 last admission, has not been compliant w/ meds or follow up with anyone since  - Previously discharge on Glimiperide 1mg QD and  Metformin 500mg BID  - A1c: 7.8  - Continue MISS

## 2023-02-14 NOTE — PHYSICAL THERAPY INITIAL EVALUATION ADULT - PERTINENT HX OF CURRENT PROBLEM, REHAB EVAL
Patient is a 62 year old female who initially presented to [facility] ED for evaluation of a fall vs syncope. Pt domiciled alone, walks w/ walker, and reports that this morning she was in her kitchen and fell to the ground knocking over her kitchen garbage can, but does not remember falling or if she tripped or got dizzy. Pt also endorses poor PO intake recently for unspecified reasons.  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  Pt is now admitted to cardiac tele for further management of fall vs syncope and electrolyte derangement.

## 2023-02-14 NOTE — DISCHARGE NOTE PROVIDER - CARE PROVIDER_API CALL
Mynor Roberto)  Cardiology; Internal Medicine  [address] [place] [address]  Phone: [phone]  Fax: [phone]  Follow Up Time: 2 weeks

## 2023-02-14 NOTE — PROGRESS NOTE ADULT - PROBLEM SELECTOR PLAN 4
Patient with questionable afib when admitted, however, upon closer look to EKG, patient w/ PACs  - No need for A/C at this time   - Telemetry monitoring
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
## 2023-02-14 NOTE — DISCHARGE NOTE PROVIDER - HOSPITAL COURSE
Patient is a 63 y/o Female recent former 15-20 pack-year smoker (quit weeks ago) w/ PMHx of non-compliance w/ meds 2/2 paranoid delusions, HTN, HLD, DM, hypothyroidism, hyponatremia, breast cancer, who presented to [facility] ED on 2/12/23 for evaluation of a fall vs syncope.  Pt domiciled alone, walks w/ walker, and reports that this morning she was in her kitchen and fell to the ground knocking over her kitchen garbage can, but does not remember falling or if she tripped or got dizzy. Pt also endorses poor PO intake recently for unspecified reasons. Of note, pt had prior admission in 11/2022 p/w generalized weakness and poor PO intake w/ subsequent MICU admission for severe hyponatremia. Patient now admitted to cardiology/telemetry for further management of syncope vs. fall and elevated troponin. Patient found to be orthostatic positive. Patient underwent echocardiogram  02/13/2023:  LVSF is borderline with a calculated by 50-55%. Grade I DD. RV is normal in size. RVSF is normal. Basal inferoseptal segment and basal inferior segment are abnormal. Normal motion in the remaining regions. No significant valvular disease. EF: 50-55%. Patient further underwent CCTA 2/13/2023: calcium score is severe at 889 Agatson units, which is at the 99 percentile, adjusted for age, gender, and race. Severe mLAD and RPL stenosis. Moderate LCX.  Patient initially recommended for cath, however, discussed with IC Dr. Taylor - given patient's poor trust in medical system and noncompliance with medication, decision made to take a conservative approach and treat patient's CAD medically with Aspirin 81 mg daily and Atorvastatin 40 mg daily. Patient without ACS symptoms, EKG changes, or chest pain at this time. Additionally, patient presented with hyponatremia on admission - with a sodium of 128, most likely due to patient's poor PO intake and difficulty taking care of herself prior to arrival to hospital. Patient's hyponatremia resolved.   Patient w/ questionable afib when admitted, however, upon closer look to EKG, patient w/ PACs. No need for A/C at this time. Psych consulted for concern for paranoid delusions - no psychiatric intervention needed at this time, patient referred to outpatient therapy. Hospital course further complicated by hypertension - patient started on Coreg 12.5 mg twice daily, Lisinopril 10 mg daily, and Spironolactone 25 mg daily.  PT worked with patient who recommended patient to go to [facility] upon discharge.     No significant events on telemetry overnight. Repeat EKG without ischemic changes. Patient has been medically cleared for discharge as per Dr. Roberto. Patient has been given appropriate discharge instructions including medication regimen, access site management and follow up. Medications that patient needs refills on have been e-prescribed to preferred pharmacy.      Patient is a 61 y/o Female recent former 15-20 pack-year smoker (quit weeks ago) w/ PMHx of non-compliance w/ meds 2/2 paranoid delusions, HTN, HLD, DM, hypothyroidism, hyponatremia, breast cancer, who presented to [facility] ED on 2/12/23 for evaluation of a fall vs syncope.  Pt domiciled alone, walks w/ walker, and reports that this morning she was in her kitchen and fell to the ground knocking over her kitchen garbage can, but does not remember falling or if she tripped or got dizzy. Pt also endorses poor PO intake recently for unspecified reasons. Of note, pt had prior admission in 11/2022 p/w generalized weakness and poor PO intake w/ subsequent MICU admission for severe hyponatremia. Patient now admitted to cardiology/telemetry for further management of syncope vs. fall and elevated troponin. Patient found to be orthostatic positive. Patient underwent echocardiogram  02/13/2023:  LVSF is borderline with a calculated by 50-55%. Grade I DD. RV is normal in size. RVSF is normal. Basal inferoseptal segment and basal inferior segment are abnormal. Normal motion in the remaining regions. No significant valvular disease. EF: 50-55%. Patient further underwent CCTA 2/13/2023: calcium score is severe at 889 Agatson units, which is at the 99 percentile, adjusted for age, gender, and race. Severe mLAD and RPL stenosis. Moderate LCX.  Patient initially recommended for cath, however, discussed with IC Dr. Taylor - given patient's poor trust in medical system and noncompliance with medication, decision made to take a conservative approach and treat patient's CAD medically with Aspirin 81 mg daily and Atorvastatin 40 mg daily. Patient without ACS symptoms, EKG changes, or chest pain at this time. Additionally, patient presented with hyponatremia on admission - with a sodium of 128, most likely due to patient's poor PO intake and difficulty taking care of herself prior to arrival to hospital. Patient's hyponatremia resolved.   Patient w/ questionable afib when admitted, however, upon closer look to EKG, patient w/ PACs. No need for A/C at this time. Psych consulted for concern for paranoid delusions - no psychiatric intervention needed at this time, patient referred to outpatient therapy. Hospital course further complicated by hypertension. She was started on Coreg 25mg BID, Lisinopril 30 mg daily, and Spironolactone. PT worked with patient who recommended patient to go to [facility] upon discharge.     No significant events on telemetry overnight. Repeat EKG without ischemic changes. Patient has been medically cleared for discharge as per Dr. Moore. Patient has been given appropriate discharge instructions including medication regimen, access site management and follow up. Medications that patient needs refills on have been e-prescribed to preferred pharmacy.     Discharge Medications: Cephalexin 500 mg four times a day (last dose 2/28/2023), Carvedilol 25mg BID, Lisinopril 30mg QD, Spironolactone 25 mg QD, ASA 81mg QD, Atorvastatin 40mg QD, Metformin 500 mg (previous home med - last dose 11/2022), Glimepiride 1 mg (last dose 11/2022) Patient is a 61 y/o Female recent former 15-20 pack-year smoker (quit weeks ago) w/ PMHx of non-compliance w/ meds 2/2 paranoid delusions, HTN, HLD, DM, hypothyroidism, hyponatremia, breast cancer, who presented to [facility] ED on 2/12/23 for evaluation of a fall vs syncope.  Pt domiciled alone, walks w/ walker, and reports that this morning she was in her kitchen and fell to the ground knocking over her kitchen garbage can, but does not remember falling or if she tripped or got dizzy. Pt also endorses poor PO intake recently for unspecified reasons. Of note, pt had prior admission in 11/2022 p/w generalized weakness and poor PO intake w/ subsequent MICU admission for severe hyponatremia. Patient now admitted to cardiology/telemetry for further management of syncope vs. fall and elevated troponin. Patient found to be orthostatic positive. Patient underwent echocardiogram  02/13/2023:  LVSF is borderline with a calculated by 50-55%. Grade I DD. RV is normal in size. RVSF is normal. Basal inferoseptal segment and basal inferior segment are abnormal. Normal motion in the remaining regions. No significant valvular disease. EF: 50-55%. Patient further underwent CCTA 2/13/2023: calcium score is severe at 889 Agatson units, which is at the 99 percentile, adjusted for age, gender, and race. Severe mLAD and RPL stenosis. Moderate LCX.  Patient initially recommended for cath, however, discussed with IC Dr. Taylor - given patient's poor trust in medical system and noncompliance with medication, decision made to take a conservative approach and treat patient's CAD medically with Aspirin 81 mg daily and Atorvastatin 40 mg daily. Patient without ACS symptoms, EKG changes, or chest pain at this time. Additionally, patient presented with hyponatremia on admission - with a sodium of 128, most likely due to patient's poor PO intake and difficulty taking care of herself prior to arrival to hospital. Patient's hyponatremia resolved.   Patient w/ questionable afib when admitted, however, upon closer look to EKG, patient w/ PACs. No need for A/C at this time. Psych consulted for concern for paranoid delusions - no psychiatric intervention needed at this time, patient referred to outpatient therapy. Hospital course further complicated by hypertension. She was started on Coreg 25mg BID, Lisinopril 30 mg daily, and Spironolactone. PT worked with patient who recommended patient to go to [facility] upon discharge.     Pt seen at bedside today, found HDS, NAD.  No significant events on telemetry overnight. Repeat EKG without ischemic changes. Patient has been medically cleared for discharge as per Dr. Moore. Patient has been given appropriate discharge instructions including medication regimen, access site management and follow up. Medications that patient needs refills on have been e-prescribed to preferred pharmacy.     Discharge Medications: Cephalexin 500 mg four times a day (last dose 2/28/2023), Carvedilol 25mg BID, Lisinopril 30mg QD, Spironolactone 25 mg QD, ASA 81mg QD, Atorvastatin 40mg QD, Metformin 500 mg (previous home med - last dose 11/2022), Glimepiride 1 mg (last dose 11/2022) Patient is a 63 y/o Female recent former 15-20 pack-year smoker (quit weeks ago) w/ PMHx of non-compliance w/ meds 2/2 paranoid delusions, HTN, HLD, DM, hypothyroidism, hyponatremia, breast cancer, who presented to [facility] ED on 2/12/23 for evaluation of a fall vs syncope.  Pt domiciled alone, walks w/ walker, and reports that this morning she was in her kitchen and fell to the ground knocking over her kitchen garbage can, but does not remember falling or if she tripped or got dizzy. Pt also endorses poor PO intake recently for unspecified reasons. Of note, pt had prior admission in 11/2022 p/w generalized weakness and poor PO intake w/ subsequent MICU admission for severe hyponatremia. Patient now admitted to cardiology/telemetry for further management of syncope vs. fall and elevated troponin. Patient found to be orthostatic positive. Patient underwent echocardiogram  02/13/2023:  LVSF is borderline with a calculated by 50-55%. Grade I DD. RV is normal in size. RVSF is normal. Basal inferoseptal segment and basal inferior segment are abnormal. Normal motion in the remaining regions. No significant valvular disease. EF: 50-55%. Patient further underwent CCTA 2/13/2023: calcium score is severe at 889 Agatson units, which is at the 99 percentile, adjusted for age, gender, and race. Severe mLAD and RPL stenosis. Moderate LCX.  Patient initially recommended for cath, however, discussed with IC Dr. Taylor - given patient's poor trust in medical system and noncompliance with medication, decision made to take a conservative approach and treat patient's CAD medically with Aspirin 81 mg daily and Atorvastatin 40 mg daily. Patient without ACS symptoms, EKG changes, or chest pain at this time. Additionally, patient presented with hyponatremia on admission - with a sodium of 128, most likely due to patient's poor PO intake and difficulty taking care of herself prior to arrival to hospital. Patient's hyponatremia resolved.   Patient w/ questionable afib when admitted, however, upon closer look to EKG, patient w/ PACs. No need for A/C at this time. Psych consulted for concern for paranoid delusions - no psychiatric intervention needed at this time, patient referred to outpatient therapy. Hospital course further complicated by hypertension. She was started on Coreg 25mg BID, Lisinopril 30 mg daily, and Spironolactone. PT worked with patient who recommended patient to go to [facility] upon discharge.     Of note: pt     Pt seen at bedside today, found HDS, NAD.  No significant events on telemetry overnight. Repeat EKG without ischemic changes. Patient has been medically cleared for discharge as per Dr. Moore. Patient has been given appropriate discharge instructions including medication regimen, access site management and follow up. Medications that patient needs refills on have been e-prescribed to preferred pharmacy.     Discharge Medications: Cephalexin 500 mg four times a day (last dose 2/28/2023), Carvedilol 25mg BID, Lisinopril 30mg QD, Spironolactone 25 mg QD, ASA 81mg QD, Atorvastatin 40mg QD, Metformin 500 mg (previous home med - last dose 11/2022), Glimepiride 1 mg (last dose 11/2022) Patient is a 63 y/o Female recent former 15-20 pack-year smoker (quit weeks ago) w/ PMHx of non-compliance w/ meds 2/2 paranoid delusions, HTN, HLD, DM, hypothyroidism, hyponatremia, breast cancer, who presented to [facility] ED on 2/12/23 for evaluation of a fall vs syncope.  Pt domiciled alone, walks w/ walker, and reports that this morning she was in her kitchen and fell to the ground knocking over her kitchen garbage can, but does not remember falling or if she tripped or got dizzy. Pt also endorses poor PO intake recently for unspecified reasons. Of note, pt had prior admission in 11/2022 p/w generalized weakness and poor PO intake w/ subsequent MICU admission for severe hyponatremia. Patient now admitted to cardiology/telemetry for further management of syncope vs. fall and elevated troponin. Patient found to be orthostatic positive. Patient underwent echocardiogram  02/13/2023:  LVSF is borderline with a calculated by 50-55%. Grade I DD. RV is normal in size. RVSF is normal. Basal inferoseptal segment and basal inferior segment are abnormal. Normal motion in the remaining regions. No significant valvular disease. EF: 50-55%. Patient further underwent CCTA 2/13/2023: calcium score is severe at 889 Agatson units, which is at the 99 percentile, adjusted for age, gender, and race. Severe mLAD and RPL stenosis. Moderate LCX.  Patient initially recommended for cath, however, discussed with IC Dr. Taylor - given patient's poor trust in medical system and noncompliance with medication, decision made to take a conservative approach and treat patient's CAD medically with Aspirin 81 mg daily and Atorvastatin 40 mg daily. Patient without ACS symptoms, EKG changes, or chest pain at this time. Additionally, patient presented with hyponatremia on admission - with a sodium of 128, most likely due to patient's poor PO intake and difficulty taking care of herself prior to arrival to hospital. Patient's hyponatremia resolved.   Patient w/ questionable afib when admitted, however, upon closer look to EKG, patient w/ PACs. No need for A/C at this time. Psych consulted for concern for paranoid delusions - no psychiatric intervention needed at this time, patient referred to outpatient therapy. Hospital course further complicated by hypertension. She was started on Coreg 25mg BID, Lisinopril 30 mg daily, and Spironolactone. PT worked with patient who recommended patient to go to [facility] upon discharge.     Of note: pt noted with SBP ranging [id number]/80, remained asymptomatic, ordered for Nifedipine 30mg PO x 1 dose, pt adamantly refused, citing Nifedipine causes LE swelling,. Re-ordered for Hydralazine 25mg PO x 1. Also noted with some episodes of loose stools, s/p Imodium PO x 1 dose, will recommend high fiber diet. Pt seen at bedside today, found HDS, NAD.  No significant events on telemetry overnight. Repeat EKG without ischemic changes. Patient has been medically cleared for discharge as per Dr. Moore. Patient has been given appropriate discharge instructions including medication regimen, access site management and follow up. Medications that patient needs refills on have been e-prescribed to preferred pharmacy.     Discharge Medications: Cephalexin 500 mg four times a day (last dose 2/28/2023), Carvedilol 25mg BID, Lisinopril 30mg QD, Spironolactone 25 mg QD, ASA 81mg QD, Atorvastatin 40mg QD, Metformin 500 mg (previous home med - last dose 11/2022), Metformin 500mg daily, Glimepiride 1 mg (last dose 11/2022) Patient is a 61 y/o Female recent former 15-20 pack-year smoker (quit weeks ago) w/ PMHx of non-compliance w/ meds 2/2 paranoid delusions, HTN, HLD, DM, hypothyroidism, hyponatremia, breast cancer, who presented to [facility] ED on 2/12/23 for evaluation of a fall vs syncope.  Pt domiciled alone, walks w/ walker, and reports that this morning she was in her kitchen and fell to the ground knocking over her kitchen garbage can, but does not remember falling or if she tripped or got dizzy. Pt also endorses poor PO intake recently for unspecified reasons. Of note, pt had prior admission in 11/2022 p/w generalized weakness and poor PO intake w/ subsequent MICU admission for severe hyponatremia. Patient now admitted to cardiology/telemetry for further management of syncope vs. fall and elevated troponin. Patient found to be orthostatic positive. Patient underwent echocardiogram  02/13/2023:  LVSF is borderline with a calculated by 50-55%. Grade I DD. RV is normal in size. RVSF is normal. Basal inferoseptal segment and basal inferior segment are abnormal. Normal motion in the remaining regions. No significant valvular disease. EF: 50-55%. Patient further underwent CCTA 2/13/2023: calcium score is severe at 889 Agatson units, which is at the 99 percentile, adjusted for age, gender, and race. Severe mLAD and RPL stenosis. Moderate LCX.  Patient initially recommended for cath, however, discussed with IC Dr. Taylor - given patient's poor trust in medical system and noncompliance with medication, decision made to take a conservative approach and treat patient's CAD medically with Aspirin 81 mg daily and Atorvastatin 40 mg daily. Patient without ACS symptoms, EKG changes, or chest pain at this time. Additionally, patient presented with hyponatremia on admission - with a sodium of 128, most likely due to patient's poor PO intake and difficulty taking care of herself prior to arrival to hospital. Patient's hyponatremia resolved.   Patient w/ questionable afib when admitted, however, upon closer look to EKG, patient w/ PACs. No need for A/C at this time. Psych consulted for concern for paranoid delusions - no psychiatric intervention needed at this time, patient referred to outpatient therapy. Hospital course further complicated by hypertension. She was started on Coreg 25mg BID, Lisinopril 30 mg daily, and Spironolactone. PT worked with patient who recommended patient to go to [facility] upon discharge.     Of note: pt noted with SBP ranging [id number]/80, remained asymptomatic, ordered for Nifedipine 30mg PO x 1 dose, pt adamantly refused, citing Nifedipine causes LE swelling,. Re-ordered for Hydralazine 25mg PO x 1 with improvement in BP 170s. Also noted with some episodes of loose stools, s/p Imodium PO x 1 dose, will recommend high fiber diet. Pt seen at bedside today, found HDS, NAD.  No significant events on telemetry overnight. Repeat EKG without ischemic changes. Patient has been medically cleared for discharge as per Dr. Moore. Patient has been given appropriate discharge instructions including medication regimen, access site management and follow up. Medications that patient needs refills on have been e-prescribed to preferred pharmacy.     Discharge Medications: Cephalexin 500 mg four times a day (last dose 2/28/2023), Carvedilol 25mg BID, Lisinopril 30mg QD, Spironolactone 25 mg QD, ASA 81mg QD, Atorvastatin 40mg QD, Metformin 500 mg (previous home med - last dose 11/2022), Metformin 500mg daily, Glimepiride 1 mg (last dose 11/2022)

## 2023-02-14 NOTE — DISCHARGE NOTE PROVIDER - NSDCMRMEDTOKEN_GEN_ALL_CORE_FT
aspirin 81 mg oral delayed release tablet: 1 tab(s) orally once a day  carvedilol 25 mg oral tablet: 1 tab(s) orally every 12 hours  cephalexin 500 mg oral capsule: 1 cap(s) orally 4 times a day  glimepiride: 1 milligram(s) orally once a day  Lipitor 40 mg oral tablet: 1 tab(s) orally once a day (at bedtime)  lisinopril 30 mg oral tablet: 1 tab(s) orally once a day  metFORMIN 500 mg oral tablet: 1 tab(s) orally once a day  spironolactone 25 mg oral tablet: 1 tab(s) orally once a day   aspirin 81 mg oral delayed release tablet: 1 tab(s) orally once a day  carvedilol 25 mg oral tablet: 1 tab(s) orally every 12 hours  cephalexin 500 mg oral capsule: 1 cap(s) orally 4 times a day  glimepiride: 1 milligram(s) orally once a day  Lipitor 40 mg oral tablet: 1 tab(s) orally once a day (at bedtime)  lisinopril 30 mg oral tablet: 1 tab(s) orally once a day  metFORMIN 500 mg oral tablet: 1 tab(s) orally once a day  nystatin 100,000 units/g topical powder: 1 application topically every 12 hours  spironolactone 25 mg oral tablet: 1 tab(s) orally once a day

## 2023-02-14 NOTE — PHYSICAL THERAPY INITIAL EVALUATION ADULT - LEVEL OF INDEPENDENCE: STAIR NEGOTIATION, REHAB EVAL
patient with unsteady stair negotiation, further stair trial deferred/moderate assist (50% patients effort)

## 2023-02-14 NOTE — PHYSICAL THERAPY INITIAL EVALUATION ADULT - GENERAL OBSERVATIONS, REHAB EVAL
Spoke with CHRISTIANA Triana who cleared for OOB. Patient received semi-supine in NAD with +ECG +heplock

## 2023-02-14 NOTE — PROGRESS NOTE ADULT - PROBLEM SELECTOR PLAN 1
Patient presents with unwitnessed fall vs syncope - reports generalized weakness/fatigue for the past few weeks and recent poor intake. Pt denies LOC, prodrome/post-ictal period, or trauma  - Currently reports generalized fatigue/weakness  - EKG 2/12/2023 read as afib, however likely NSR with PACS  - Troponin T peaked at 0.06  - Patient orthostatic + on 2/13/2023  - ECHO 02/13/2023:  LVSF is borderline with a calculated by 50-55%. Grade I DD. RV is normal in size. RVSF is normal. Basal inferoseptal segment and basal inferior segment are abnormal. Normal motion in the remaining regions. No significant valvular disease. EF: 50-55%.   - Continue Telemetry monitoring

## 2023-02-15 LAB
ALBUMIN SERPL ELPH-MCNC: 3.1 G/DL — LOW (ref 3.3–5)
ALP SERPL-CCNC: 56 U/L — SIGNIFICANT CHANGE UP (ref 40–120)
ALT FLD-CCNC: <5 U/L — LOW (ref 10–45)
ANION GAP SERPL CALC-SCNC: 10 MMOL/L — SIGNIFICANT CHANGE UP (ref 5–17)
AST SERPL-CCNC: 9 U/L — LOW (ref 10–40)
BASOPHILS # BLD AUTO: 0.11 K/UL — SIGNIFICANT CHANGE UP (ref 0–0.2)
BASOPHILS NFR BLD AUTO: 1.1 % — SIGNIFICANT CHANGE UP (ref 0–2)
BILIRUB SERPL-MCNC: 0.5 MG/DL — SIGNIFICANT CHANGE UP (ref 0.2–1.2)
BUN SERPL-MCNC: 10 MG/DL — SIGNIFICANT CHANGE UP (ref 7–23)
CALCIUM SERPL-MCNC: 8 MG/DL — LOW (ref 8.4–10.5)
CHLORIDE SERPL-SCNC: 94 MMOL/L — LOW (ref 96–108)
CO2 SERPL-SCNC: 27 MMOL/L — SIGNIFICANT CHANGE UP (ref 22–31)
CREAT SERPL-MCNC: 0.83 MG/DL — SIGNIFICANT CHANGE UP (ref 0.5–1.3)
EGFR: 79 ML/MIN/1.73M2 — SIGNIFICANT CHANGE UP
EOSINOPHIL # BLD AUTO: 0.2 K/UL — SIGNIFICANT CHANGE UP (ref 0–0.5)
EOSINOPHIL NFR BLD AUTO: 2.1 % — SIGNIFICANT CHANGE UP (ref 0–6)
GLUCOSE BLDC GLUCOMTR-MCNC: 128 MG/DL — HIGH (ref 70–99)
GLUCOSE BLDC GLUCOMTR-MCNC: 129 MG/DL — HIGH (ref 70–99)
GLUCOSE BLDC GLUCOMTR-MCNC: 131 MG/DL — HIGH (ref 70–99)
GLUCOSE BLDC GLUCOMTR-MCNC: 210 MG/DL — HIGH (ref 70–99)
GLUCOSE BLDC GLUCOMTR-MCNC: 242 MG/DL — HIGH (ref 70–99)
GLUCOSE SERPL-MCNC: 132 MG/DL — HIGH (ref 70–99)
HCT VFR BLD CALC: 35.2 % — SIGNIFICANT CHANGE UP (ref 34.5–45)
HGB BLD-MCNC: 11.9 G/DL — SIGNIFICANT CHANGE UP (ref 11.5–15.5)
IMM GRANULOCYTES NFR BLD AUTO: 0.6 % — SIGNIFICANT CHANGE UP (ref 0–0.9)
LYMPHOCYTES # BLD AUTO: 3.17 K/UL — SIGNIFICANT CHANGE UP (ref 1–3.3)
LYMPHOCYTES # BLD AUTO: 32.7 % — SIGNIFICANT CHANGE UP (ref 13–44)
MAGNESIUM SERPL-MCNC: 2 MG/DL — SIGNIFICANT CHANGE UP (ref 1.6–2.6)
MCHC RBC-ENTMCNC: 27.6 PG — SIGNIFICANT CHANGE UP (ref 27–34)
MCHC RBC-ENTMCNC: 33.8 GM/DL — SIGNIFICANT CHANGE UP (ref 32–36)
MCV RBC AUTO: 81.7 FL — SIGNIFICANT CHANGE UP (ref 80–100)
MONOCYTES # BLD AUTO: 0.65 K/UL — SIGNIFICANT CHANGE UP (ref 0–0.9)
MONOCYTES NFR BLD AUTO: 6.7 % — SIGNIFICANT CHANGE UP (ref 2–14)
NEUTROPHILS # BLD AUTO: 5.49 K/UL — SIGNIFICANT CHANGE UP (ref 1.8–7.4)
NEUTROPHILS NFR BLD AUTO: 56.8 % — SIGNIFICANT CHANGE UP (ref 43–77)
NRBC # BLD: 0 /100 WBCS — SIGNIFICANT CHANGE UP (ref 0–0)
PLATELET # BLD AUTO: 468 K/UL — HIGH (ref 150–400)
POTASSIUM SERPL-MCNC: 3.8 MMOL/L — SIGNIFICANT CHANGE UP (ref 3.5–5.3)
POTASSIUM SERPL-SCNC: 3.8 MMOL/L — SIGNIFICANT CHANGE UP (ref 3.5–5.3)
PROT SERPL-MCNC: 6.1 G/DL — SIGNIFICANT CHANGE UP (ref 6–8.3)
RBC # BLD: 4.31 M/UL — SIGNIFICANT CHANGE UP (ref 3.8–5.2)
RBC # FLD: 14.5 % — SIGNIFICANT CHANGE UP (ref 10.3–14.5)
SODIUM SERPL-SCNC: 131 MMOL/L — LOW (ref 135–145)
WBC # BLD: 9.68 K/UL — SIGNIFICANT CHANGE UP (ref 3.8–10.5)
WBC # FLD AUTO: 9.68 K/UL — SIGNIFICANT CHANGE UP (ref 3.8–10.5)

## 2023-02-15 PROCEDURE — 99232 SBSQ HOSP IP/OBS MODERATE 35: CPT

## 2023-02-15 RX ORDER — SODIUM CHLORIDE 9 MG/ML
500 INJECTION INTRAMUSCULAR; INTRAVENOUS; SUBCUTANEOUS ONCE
Refills: 0 | Status: COMPLETED | OUTPATIENT
Start: 2023-02-15 | End: 2023-02-15

## 2023-02-15 RX ORDER — CARVEDILOL PHOSPHATE 80 MG/1
12.5 CAPSULE, EXTENDED RELEASE ORAL ONCE
Refills: 0 | Status: COMPLETED | OUTPATIENT
Start: 2023-02-15 | End: 2023-02-15

## 2023-02-15 RX ORDER — CARVEDILOL PHOSPHATE 80 MG/1
25 CAPSULE, EXTENDED RELEASE ORAL EVERY 12 HOURS
Refills: 0 | Status: DISCONTINUED | OUTPATIENT
Start: 2023-02-15 | End: 2023-02-23

## 2023-02-15 RX ORDER — LISINOPRIL 2.5 MG/1
20 TABLET ORAL DAILY
Refills: 0 | Status: DISCONTINUED | OUTPATIENT
Start: 2023-02-16 | End: 2023-02-21

## 2023-02-15 RX ADMIN — NYSTATIN CREAM 1 APPLICATION(S): 100000 CREAM TOPICAL at 06:01

## 2023-02-15 RX ADMIN — ENOXAPARIN SODIUM 40 MILLIGRAM(S): 100 INJECTION SUBCUTANEOUS at 18:49

## 2023-02-15 RX ADMIN — Medication 81 MILLIGRAM(S): at 11:25

## 2023-02-15 RX ADMIN — SODIUM CHLORIDE 500 MILLILITER(S): 9 INJECTION INTRAMUSCULAR; INTRAVENOUS; SUBCUTANEOUS at 15:45

## 2023-02-15 RX ADMIN — CARVEDILOL PHOSPHATE 25 MILLIGRAM(S): 80 CAPSULE, EXTENDED RELEASE ORAL at 18:02

## 2023-02-15 RX ADMIN — Medication 4: at 13:16

## 2023-02-15 RX ADMIN — SPIRONOLACTONE 25 MILLIGRAM(S): 25 TABLET, FILM COATED ORAL at 05:32

## 2023-02-15 RX ADMIN — CARVEDILOL PHOSPHATE 12.5 MILLIGRAM(S): 80 CAPSULE, EXTENDED RELEASE ORAL at 10:09

## 2023-02-15 RX ADMIN — NYSTATIN CREAM 1 APPLICATION(S): 100000 CREAM TOPICAL at 18:02

## 2023-02-15 RX ADMIN — LISINOPRIL 10 MILLIGRAM(S): 2.5 TABLET ORAL at 05:32

## 2023-02-15 NOTE — PROGRESS NOTE ADULT - SUBJECTIVE AND OBJECTIVE BOX
Cardiology PA Adult Progress Note    SUBJECTIVE ASSESSMENT:  	  MEDICATIONS:  carvedilol 25 milliGRAM(s) Oral every 12 hours  spironolactone 25 milliGRAM(s) Oral daily  dextrose Oral Gel 15 Gram(s) Oral once PRN  glucagon  Injectable 1 milliGRAM(s) IntraMuscular once  insulin lispro (ADMELOG) corrective regimen sliding scale   SubCutaneous three times a day before meals  insulin lispro (ADMELOG) corrective regimen sliding scale   SubCutaneous at bedtime  aspirin enteric coated 81 milliGRAM(s) Oral daily  enoxaparin Injectable 40 milliGRAM(s) SubCutaneous every 24 hours  influenza   Vaccine 0.5 milliLiter(s) IntraMuscular once  nystatin Powder 1 Application(s) Topical every 12 hours  	  VITAL SIGNS:  T(C): 36.5 (02-15-23 @ 09:44), Max: 36.5 (02-15-23 @ 09:44)  HR: 63 (02-15-23 @ 09:00) (57 - 99)  BP: 181/79 (02-15-23 @ 09:00) (126/70 - 191/79)  RR: 16 (02-15-23 @ 09:00) (16 - 20)  SpO2: 95% (02-15-23 @ 09:00) (95% - 98%)    I&O's Summary  14 Feb 2023 07:01  -  15 Feb 2023 07:00  --------------------------------------------------------  IN: 440 mL / OUT: 450 mL / NET: -10 mL                                     PHYSICAL EXAM:  Appearance: Normal	  HEENT: Normal oral mucosa, PERRL, EOMI	  Neck: Supple, + JVD/ - JVD; ___ Carotid Bruit   Cardiovascular: Normal S1 S2, No murmurs  Respiratory: Lungs clear to auscultation/Decreased Breath Sounds/No Rales, Rhonchi, Wheezing	  Gastrointestinal:  Soft, Non-tender, + BS	  Skin: No rashes, No ecchymoses, No cyanosis  Extremities: Normal range of motion, No clubbing, cyanosis or edema  Vascular: Peripheral pulses palpable 2+ bilaterally  Neurologic: Non-focal  Psychiatry: A & O x 3, Mood & affect appropriate    LABS:	 	              11.9   9.68  )-----------( 468      ( 15 Feb 2023 05:30 )             35.2     02-15    131<L>  |  94<L>  |  10  ----------------------------<  132<H>  3.8   |  27  |  0.83    Ca    8.0<L>      15 Feb 2023 05:30  Mg     2.0     02-15    TPro  6.1  /  Alb  3.1<L>  /  TBili  0.5  /  DBili  x   /  AST  9<L>  /  ALT  <5<L>  /  AlkPhos  56  02-15 Cardiology PA Adult Progress Note    SUBJECTIVE ASSESSMENT: Pt seen and examined at bedside this AM laying comfortably in bed w/o any complaints or events overnight. She denies any CP, palpitations, dizziness/lightheadedness, SOB, orthopnea, PND, fatigue, N/V or abd pain.  	  MEDICATIONS:  carvedilol 25 milliGRAM(s) Oral every 12 hours  spironolactone 25 milliGRAM(s) Oral daily  dextrose Oral Gel 15 Gram(s) Oral once PRN  glucagon  Injectable 1 milliGRAM(s) IntraMuscular once  insulin lispro (ADMELOG) corrective regimen sliding scale   SubCutaneous three times a day before meals  insulin lispro (ADMELOG) corrective regimen sliding scale   SubCutaneous at bedtime  aspirin enteric coated 81 milliGRAM(s) Oral daily  enoxaparin Injectable 40 milliGRAM(s) SubCutaneous every 24 hours  influenza   Vaccine 0.5 milliLiter(s) IntraMuscular once  nystatin Powder 1 Application(s) Topical every 12 hours  	  VITAL SIGNS:  T(C): 36.5 (02-15-23 @ 09:44), Max: 36.5 (02-15-23 @ 09:44)  HR: 63 (02-15-23 @ 09:00) (57 - 99)  BP: 181/79 (02-15-23 @ 09:00) (126/70 - 191/79)  RR: 16 (02-15-23 @ 09:00) (16 - 20)  SpO2: 95% (02-15-23 @ 09:00) (95% - 98%)    I&O's Summary  14 Feb 2023 07:01  -  15 Feb 2023 07:00  --------------------------------------------------------  IN: 440 mL / OUT: 450 mL / NET: -10 mL                                     PHYSICAL EXAM:  Appearance: Normal	  HEENT: Normal oral mucosa, PERRL, EOMI	  Neck: Supple, - JVD; no Carotid Bruit   Cardiovascular: Normal S1 S2, No murmurs  Respiratory: Lungs clear to auscultation, No Rales, Rhonchi, Wheezing	  Gastrointestinal:  Soft, Non-tender, + BS	  Skin: No rashes, No ecchymoses, No cyanosis  Extremities: Normal range of motion, No clubbing, cyanosis or edema  Vascular: Peripheral pulses palpable 2+ bilaterally  Neurologic: Non-focal  Psychiatry: A & O x 3, Mood & affect appropriate    LABS:	 	              11.9   9.68  )-----------( 468      ( 15 Feb 2023 05:30 )             35.2     02-15    131<L>  |  94<L>  |  10  ----------------------------<  132<H>  3.8   |  27  |  0.83    Ca    8.0<L>      15 Feb 2023 05:30  Mg     2.0     02-15    TPro  6.1  /  Alb  3.1<L>  /  TBili  0.5  /  DBili  x   /  AST  9<L>  /  ALT  <5<L>  /  AlkPhos  56  02-15

## 2023-02-15 NOTE — PROGRESS NOTE ADULT - PROBLEM SELECTOR PLAN 5
Hx of paranoid delusions including "they put sedatives in my medicine from the pharmacy", "there is a man who bangs around my apartment", and "all this started when I had an device placed by my dentist in 2005, maybe it's monitoring my brain or tracking me I don't know."  - Na 130 c/w discharge on prior admission, denies ETOH/illicit drug use, no evidence of metabolic encephalopathy  - Low BUN, Hyponatremia and Hypokalemia all c/w poor PO intake  - Utox negative  - Anurag consulted - no further psych workup needed at this time A1c 7.8  -Continue mISS  -Holding Glimepiride and Metformin

## 2023-02-15 NOTE — PROGRESS NOTE ADULT - ASSESSMENT
62F, former smoker, PMhx of medication non compliance 2/2 paranoid delusions, HTN, HLD, DM-II, Hypothyroidism, Hyponatremia and Breat CA, who presented to [facility] ED for eval of fall vs syncope and pt admitted to cardiac tele for further management w/ elevated trop. Pt found to have abnormal CCTA w/ plan for medical management 2/2 poor compliance. Hospital course c/b agitation, psych consulted and pt has remained.   62F, former smoker, PMhx of medication non compliance 2/2 paranoid delusions, HTN, HLD, DM-II, Hypothyroidism, Hyponatremia (requiring MICU admission 11/2022) and Breast CA, who presented to [facility] ED for eval of fall vs syncope and pt admitted to cardiac tele for further management w/ elevated trop. Pt found to have abnormal CCTA w/ plan for medical management 2/2 poor compliance. Hospital course c/b agitation, psych consulted and pt has remained stable. Pt now pending JAZLYN placement.

## 2023-02-15 NOTE — PROGRESS NOTE ADULT - PROBLEM SELECTOR PLAN 4
Patient with questionable afib when admitted, however, upon closer look to EKG, patient w/ PACs  - No need for A/C at this time   - Telemetry monitoring T chol 173, , HDL 30  -Continue Atorvastatin 40mg QD

## 2023-02-15 NOTE — PROGRESS NOTE ADULT - PROBLEM SELECTOR PLAN 7
Continue Spironolactone 25 mg daily, Lisinopril 10 mg daily, and Carvedilol 12.5 mg BID Hx of paranoid delusions including "they put sedatives in my medicine from the pharmacy", "there is a man who bangs around my apartment", and "all this started when I had an device placed by my dentist in 2005, maybe it's monitoring my brain or tracking me I don't know."  -Utox negative  -Anurag consulted - no further psych workup needed at this time. If pt becomes agitated can give haldol 2mg PO/IM q6h PRN     F: None  E: Replete if K<4 or Mag<2  N: DASH Diet  VTEppx: Lovenox SQ  Dispo: Cardiac tele  PT: JAZLYN, pending placement

## 2023-02-15 NOTE — PROGRESS NOTE ADULT - PROBLEM SELECTOR PLAN 2
ECHO as above  - CCTA 02/13/2023: calcium score is severe at 889 Agatson units, which is at the 99 percentile, adjusted for age, gender, and race. Severe mLAD and RPL stenosis. Moderate LCX.   - Given patient's poor medication compliance - will treat medically  - Continue Aspirin 81 mg daily, Atorvastatin 40 mg daily CP free and HD stable  -Trop 0.06 > 0.05  -EKG: NSR w/ PACs, non ischemic  -TTE 2/13: LVEF normal w/ RWMA, basal inferoseptal and basal inferior are abnormal, G1DD,   -CCTA 2/13: Ca score 889, severe mLAd and RPL stenosis, mod mLCx.  -Cardiac cath deferred 2/2 poor medication follow up, c/w medical management  -Continue ASA 81mg QD and Lipitor 40mg QD

## 2023-02-15 NOTE — PROVIDER CONTACT NOTE (OTHER) - SITUATION
Patient did not void overnight. Bladder scan 111ml. States she does not feel an urge to void and she has not been drinking fluids.

## 2023-02-15 NOTE — PROGRESS NOTE ADULT - PROBLEM SELECTOR PLAN 6
A1c 11.9 last admission, has not been compliant w/ meds or follow up with anyone since  - Previously discharge on Glimiperide 1mg QD and  Metformin 500mg BID  - A1c: 7.8  - Continue MISS TSH WNL   - No medications at this time

## 2023-02-15 NOTE — PROGRESS NOTE ADULT - PROBLEM SELECTOR PLAN 3
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

## 2023-02-15 NOTE — PROGRESS NOTE ADULT - PROBLEM SELECTOR PLAN 1
Patient presents with unwitnessed fall vs syncope - reports generalized weakness/fatigue for the past few weeks and recent poor intake. Pt denies LOC, prodrome/post-ictal period, or trauma  - Currently reports generalized fatigue/weakness  - EKG 2/12/2023 read as afib, however likely NSR with PACS  - Troponin T peaked at 0.06  - Patient orthostatic + on 2/13/2023  - ECHO 02/13/2023:  LVSF is borderline with a calculated by 50-55%. Grade I DD. RV is normal in size. RVSF is normal. Basal inferoseptal segment and basal inferior segment are abnormal. Normal motion in the remaining regions. No significant valvular disease. EF: 50-55%.   - Continue Telemetry monitoring Patient presents with unwitnessed fall vs syncope - reports generalized weakness/fatigue for the past few weeks and recent poor intake.   -orthostatic + 2/13 and again today, s/p 500cc bolus today  -Low BUN, Hyponatremia and Hypokalemia on presentation c/w poor PO intake  -Carotid US: Moderate amount of plaque. No significant stenosis  -CTH: no intracranial hemorrhage or fracture  -Continue to encourage PO intake

## 2023-02-15 NOTE — PROGRESS NOTE ADULT - PROBLEM SELECTOR PLAN 9
total cholesterol: 173, LDL: 118, HDL: 30  - Start Atorvastatin 40 mg daily      F: none   E: K >4, Mg > 2  N: DASH, TLC  Dvt: lovenox   Dispo: pending clinical progression   PT consulted - JAZLYN   Case d/w Dr. Roberto

## 2023-02-16 LAB
ANION GAP SERPL CALC-SCNC: 11 MMOL/L — SIGNIFICANT CHANGE UP (ref 5–17)
BUN SERPL-MCNC: 7 MG/DL — SIGNIFICANT CHANGE UP (ref 7–23)
CALCIUM SERPL-MCNC: 8.3 MG/DL — LOW (ref 8.4–10.5)
CHLORIDE SERPL-SCNC: 92 MMOL/L — LOW (ref 96–108)
CO2 SERPL-SCNC: 25 MMOL/L — SIGNIFICANT CHANGE UP (ref 22–31)
CREAT SERPL-MCNC: 0.7 MG/DL — SIGNIFICANT CHANGE UP (ref 0.5–1.3)
EGFR: 97 ML/MIN/1.73M2 — SIGNIFICANT CHANGE UP
GLUCOSE BLDC GLUCOMTR-MCNC: 101 MG/DL — HIGH (ref 70–99)
GLUCOSE BLDC GLUCOMTR-MCNC: 141 MG/DL — HIGH (ref 70–99)
GLUCOSE BLDC GLUCOMTR-MCNC: 193 MG/DL — HIGH (ref 70–99)
GLUCOSE BLDC GLUCOMTR-MCNC: 231 MG/DL — HIGH (ref 70–99)
GLUCOSE SERPL-MCNC: 277 MG/DL — HIGH (ref 70–99)
HCT VFR BLD CALC: 35.7 % — SIGNIFICANT CHANGE UP (ref 34.5–45)
HGB BLD-MCNC: 12.1 G/DL — SIGNIFICANT CHANGE UP (ref 11.5–15.5)
MAGNESIUM SERPL-MCNC: 1.5 MG/DL — LOW (ref 1.6–2.6)
MCHC RBC-ENTMCNC: 27.8 PG — SIGNIFICANT CHANGE UP (ref 27–34)
MCHC RBC-ENTMCNC: 33.9 GM/DL — SIGNIFICANT CHANGE UP (ref 32–36)
MCV RBC AUTO: 82.1 FL — SIGNIFICANT CHANGE UP (ref 80–100)
NRBC # BLD: 0 /100 WBCS — SIGNIFICANT CHANGE UP (ref 0–0)
PLATELET # BLD AUTO: 494 K/UL — HIGH (ref 150–400)
POTASSIUM SERPL-MCNC: 4.4 MMOL/L — SIGNIFICANT CHANGE UP (ref 3.5–5.3)
POTASSIUM SERPL-SCNC: 4.4 MMOL/L — SIGNIFICANT CHANGE UP (ref 3.5–5.3)
RBC # BLD: 4.35 M/UL — SIGNIFICANT CHANGE UP (ref 3.8–5.2)
RBC # FLD: 14.6 % — HIGH (ref 10.3–14.5)
SODIUM SERPL-SCNC: 128 MMOL/L — LOW (ref 135–145)
WBC # BLD: 9.85 K/UL — SIGNIFICANT CHANGE UP (ref 3.8–10.5)
WBC # FLD AUTO: 9.85 K/UL — SIGNIFICANT CHANGE UP (ref 3.8–10.5)

## 2023-02-16 PROCEDURE — 73630 X-RAY EXAM OF FOOT: CPT | Mod: 26,50

## 2023-02-16 PROCEDURE — 99232 SBSQ HOSP IP/OBS MODERATE 35: CPT

## 2023-02-16 RX ORDER — MAGNESIUM OXIDE 400 MG ORAL TABLET 241.3 MG
800 TABLET ORAL ONCE
Refills: 0 | Status: COMPLETED | OUTPATIENT
Start: 2023-02-16 | End: 2023-02-16

## 2023-02-16 RX ORDER — MAGNESIUM SULFATE 500 MG/ML
2 VIAL (ML) INJECTION ONCE
Refills: 0 | Status: COMPLETED | OUTPATIENT
Start: 2023-02-16 | End: 2023-02-16

## 2023-02-16 RX ADMIN — MAGNESIUM OXIDE 400 MG ORAL TABLET 800 MILLIGRAM(S): 241.3 TABLET ORAL at 22:34

## 2023-02-16 RX ADMIN — NYSTATIN CREAM 1 APPLICATION(S): 100000 CREAM TOPICAL at 17:42

## 2023-02-16 RX ADMIN — NYSTATIN CREAM 1 APPLICATION(S): 100000 CREAM TOPICAL at 07:04

## 2023-02-16 RX ADMIN — LISINOPRIL 20 MILLIGRAM(S): 2.5 TABLET ORAL at 05:18

## 2023-02-16 RX ADMIN — Medication 4: at 11:52

## 2023-02-16 RX ADMIN — CARVEDILOL PHOSPHATE 25 MILLIGRAM(S): 80 CAPSULE, EXTENDED RELEASE ORAL at 07:10

## 2023-02-16 RX ADMIN — CARVEDILOL PHOSPHATE 25 MILLIGRAM(S): 80 CAPSULE, EXTENDED RELEASE ORAL at 17:41

## 2023-02-16 RX ADMIN — Medication 25 GRAM(S): at 18:51

## 2023-02-16 RX ADMIN — SPIRONOLACTONE 25 MILLIGRAM(S): 25 TABLET, FILM COATED ORAL at 05:17

## 2023-02-16 RX ADMIN — ENOXAPARIN SODIUM 40 MILLIGRAM(S): 100 INJECTION SUBCUTANEOUS at 18:25

## 2023-02-16 RX ADMIN — Medication 81 MILLIGRAM(S): at 12:21

## 2023-02-16 NOTE — PROGRESS NOTE ADULT - ASSESSMENT
62F, former smoker, PMhx of medication non compliance 2/2 paranoid delusions, HTN, HLD, DM-II, Hypothyroidism, Hyponatremia (requiring MICU admission 11/2022) and Breast CA, who presented to [facility] ED for eval of fall vs syncope and pt admitted to cardiac tele for further management w/ elevated trop. Pt found to have abnormal CCTA w/ plan for medical management 2/2 poor compliance. Pt now pending JAZLYN auth.   62F, former smoker, PMhx of medication non compliance 2/2 paranoid delusions, HTN, HLD, DM-II, Hypothyroidism, Hyponatremia (requiring MICU admission 11/2022) and Breast CA, who presented to [facility] ED for eval of fall vs syncope i/s/o poor PO intake. Pt found to have elevated troponin and abnormal CCTA, plan for medical management 2/2 poor compliance. Pt now pending JAZLYN auth.

## 2023-02-16 NOTE — CONSULT NOTE ADULT - ASSESSMENT
61 y/o Female recent former 15-20 pack-year smoker w/ PMHx breast CA , HTN, HLD, T2DM, hypothyroidism and hyponatremia, who initially presented to [facility] ED admitted for fall. Pt domiciled alone, walks w/ walker. Podiatry consulted for elongated nails. Pt has cellulitis of L 2nd digit.     Plan:  - Debridement of nails x 10  - Excisional debridement of hyperkeratotic lesion down to level and including epidermis with scalpel.   - Cleansed wounds with normal saline  - Recommend 10 day oral antibiotic course for L toe cellulitis  - Xrays reviewed  - Bacitracin applied to wounds and DSD applied to R foot.     Plan pending discussion with Attending    Discharge dressing instructions: Cleanse wound with normal sailine daily, pat dry with sterile guaze, apply  bacitracin to wounds, Cover with dry gauze and wrap with kerlix .    Patient should follow up with Dr. Dominic Macario within 1 week of discharge.    Office information:          [place] Address- [address], [place] [address] Phone: [phone]         [place] Address- [address], [place] [address] Phone: [phone]   63 y/o Female recent former 15-20 pack-year smoker w/ PMHx breast CA , HTN, HLD, T2DM, hypothyroidism and hyponatremia, who initially presented to [facility] ED admitted for fall. Pt domiciled alone, walks w/ walker. Podiatry consulted for elongated nails. Pt has cellulitis of L 2nd digit.     Plan:  - Debridement of nails x 10  - Excisional debridement of hyperkeratotic lesion down to level and including epidermis with scalpel.   - Cleansed wounds with normal saline  - Recommend 10 day oral antibiotic course for L toe cellulitis  - Xrays reviewed  - Bacitracin applied to wounds and DSD applied to R foot.     Plan discussed with Attending    Discharge dressing instructions: Cleanse wound with normal sailine daily, pat dry with sterile guaze, apply  bacitracin to wounds, Cover with dry gauze and wrap with kerlix .    Patient should follow up with Dr. Dominic Macario within 1 week of discharge.    Office information:          [place] Address- [address], [place] [address] Phone: [phone]         [place] Address- [address], [place] [address] Phone: [phone]

## 2023-02-16 NOTE — PROGRESS NOTE ADULT - PROBLEM SELECTOR PLAN 2
CP free and HD stable  -Trop 0.06 > 0.05  -EKG: NSR w/ PACs, non ischemic  -TTE 2/13: LVEF normal w/ RWMA, basal inferoseptal and basal inferior are abnormal, G1DD,   -CCTA 2/13: Ca score 889, severe mLAd and RPL stenosis, mod mLCx.  -Cardiac cath deferred 2/2 poor medication follow up, c/w medical management  -Continue ASA 81mg QD and Lipitor 40mg QD CP free and HD stable  -Trop 0.06 > 0.05  -EKG: NSR w/ PACs, non ischemic  -TTE 2/13: LVEF normal w/ RWMA, basal inferoseptal and basal inferior are abnormal, G1DD, no valvular disease  -CCTA 2/13: Ca score 889, severe mLAD and RPL stenosis, mod mLCx.  -Cardiac cath deferred 2/2 poor medication follow up, c/w medical management  -Continue ASA 81mg QD and Lipitor 40mg QD

## 2023-02-16 NOTE — PROGRESS NOTE ADULT - PROBLEM SELECTOR PLAN 1
Patient presents with unwitnessed fall vs syncope - reports generalized weakness/fatigue for the past few weeks and recent poor intake.   -orthostatic + 2/13 and 2/15, f/u repeat  -Low BUN, Hyponatremia and Hypokalemia on presentation c/w poor PO intake  -Carotid US: Moderate amount of plaque. No significant stenosis  -CTH: no intracranial hemorrhage or fracture  -Continue to encourage PO intake Patient presents with unwitnessed fall vs syncope - reports generalized weakness/fatigue for the past few weeks and recent poor intake.   -orthostatic + 2/13 and 2/15, f/u repeat this AM  -Low BUN, Hyponatremia and Hypokalemia on presentation c/w poor PO intake  -Carotid US: Moderate amount of plaque. No significant stenosis  -CTH: no intracranial hemorrhage or fracture  -Continue to encourage PO intake Patient presents with unwitnessed fall vs syncope - reports generalized weakness/fatigue for the past few weeks and recent poor intake.   -orthostatic + daily and s/p NS bolus, f/u repeat in AM  -Low BUN, Hyponatremia and Hypokalemia on presentation 2/2 poor PO intake  -Carotid US: Moderate amount of plaque. No significant stenosis  -CTH: no intracranial hemorrhage or fracture  -Continue to encourage PO intake

## 2023-02-16 NOTE — PROGRESS NOTE ADULT - SUBJECTIVE AND OBJECTIVE BOX
Cardiology PA Adult Progress Note    SUBJECTIVE ASSESSMENT:  	  MEDICATIONS:  carvedilol 25 milliGRAM(s) Oral every 12 hours  lisinopril 20 milliGRAM(s) Oral daily  spironolactone 25 milliGRAM(s) Oral daily  dextrose Oral Gel 15 Gram(s) Oral once PRN  glucagon  Injectable 1 milliGRAM(s) IntraMuscular once  insulin lispro (ADMELOG) corrective regimen sliding scale   SubCutaneous three times a day before meals  insulin lispro (ADMELOG) corrective regimen sliding scale   SubCutaneous at bedtime  aspirin enteric coated 81 milliGRAM(s) Oral daily  enoxaparin Injectable 40 milliGRAM(s) SubCutaneous every 24 hours  influenza   Vaccine 0.5 milliLiter(s) IntraMuscular once  nystatin Powder 1 Application(s) Topical every 12 hours    VITAL SIGNS:  T(C): 36.1 (02-16-23 @ 10:21), Max: 36.6 (02-15-23 @ 12:50)  HR: 56 (02-16-23 @ 07:13) (56 - 82)  BP: 163/84 (02-16-23 @ 07:13) (124/73 - 180/82)  RR: 17 (02-16-23 @ 07:13) (16 - 20)  SpO2: 98% (02-16-23 @ 05:06) (95% - 98%)    I&O's Summary  15 Feb 2023 07:01  -  16 Feb 2023 07:00  --------------------------------------------------------  IN: 1280 mL / OUT: 1650 mL / NET: -370 mL    16 Feb 2023 07:01  -  16 Feb 2023 10:43  --------------------------------------------------------  IN: 180 mL / OUT: 0 mL / NET: 180 mL                                  PHYSICAL EXAM:  Appearance: Normal	  HEENT: Normal oral mucosa, PERRL, EOMI	  Neck: Supple, + JVD/ - JVD; ___ Carotid Bruit   Cardiovascular: Normal S1 S2, No murmurs  Respiratory: Lungs clear to auscultation/Decreased Breath Sounds/No Rales, Rhonchi, Wheezing	  Gastrointestinal:  Soft, Non-tender, + BS	  Skin: No rashes, No ecchymoses, No cyanosis  Extremities: Normal range of motion, No clubbing, cyanosis or edema  Vascular: Peripheral pulses palpable 2+ bilaterally  Neurologic: Non-focal  Psychiatry: A & O x 3, Mood & affect appropriate    LABS:	 	                      12.1   9.85  )-----------( 494      ( 16 Feb 2023 05:30 )             35.7     02-15    131<L>  |  94<L>  |  10  ----------------------------<  132<H>  3.8   |  27  |  0.83    Ca    8.0<L>      15 Feb 2023 05:30  Mg     2.0     02-15    TPro  6.1  /  Alb  3.1<L>  /  TBili  0.5  /  DBili  x   /  AST  9<L>  /  ALT  <5<L>  /  AlkPhos  56  02-15     Cardiology PA Adult Progress Note    SUBJECTIVE ASSESSMENT: Pt seen and examined at bedside this AM laying comfortably in bed w/o any complaints or events overnight. She states that she feels well and denies any SOB, orthopnea, PND, CP, palpitations, dizziness/lightheadedness, fatigue, N/V, or abd pain.  	  MEDICATIONS:  carvedilol 25 milliGRAM(s) Oral every 12 hours  lisinopril 20 milliGRAM(s) Oral daily  spironolactone 25 milliGRAM(s) Oral daily  dextrose Oral Gel 15 Gram(s) Oral once PRN  glucagon  Injectable 1 milliGRAM(s) IntraMuscular once  insulin lispro (ADMELOG) corrective regimen sliding scale   SubCutaneous three times a day before meals  insulin lispro (ADMELOG) corrective regimen sliding scale   SubCutaneous at bedtime  aspirin enteric coated 81 milliGRAM(s) Oral daily  enoxaparin Injectable 40 milliGRAM(s) SubCutaneous every 24 hours  influenza   Vaccine 0.5 milliLiter(s) IntraMuscular once  nystatin Powder 1 Application(s) Topical every 12 hours    VITAL SIGNS:  T(C): 36.1 (02-16-23 @ 10:21), Max: 36.6 (02-15-23 @ 12:50)  HR: 56 (02-16-23 @ 07:13) (56 - 82)  BP: 163/84 (02-16-23 @ 07:13) (124/73 - 180/82)  RR: 17 (02-16-23 @ 07:13) (16 - 20)  SpO2: 98% (02-16-23 @ 05:06) (95% - 98%)    I&O's Summary  15 Feb 2023 07:01  -  16 Feb 2023 07:00  --------------------------------------------------------  IN: 1280 mL / OUT: 1650 mL / NET: -370 mL    16 Feb 2023 07:01  -  16 Feb 2023 10:43  --------------------------------------------------------  IN: 180 mL / OUT: 0 mL / NET: 180 mL                                  PHYSICAL EXAM:  Appearance: Normal	  HEENT: Normal oral mucosa, PERRL, EOMI	  Neck: Supple, - JVD; no Carotid Bruit   Cardiovascular: Normal S1 S2, No murmurs  Respiratory: Lungs clear to auscultation/Decreased Breath Sounds/No Rales, Rhonchi, Wheezing	  Gastrointestinal:  Soft, Non-tender, + BS	  Skin: No rashes, No ecchymoses, No cyanosis  Extremities: Normal range of motion, No clubbing, cyanosis or edema  Vascular: Peripheral pulses palpable 2+ bilaterally  Neurologic: Non-focal  Psychiatry: A & O x 3, Mood & affect appropriate    LABS:	 	                      12.1   9.85  )-----------( 494      ( 16 Feb 2023 05:30 )             35.7     02-15    131<L>  |  94<L>  |  10  ----------------------------<  132<H>  3.8   |  27  |  0.83    Ca    8.0<L>      15 Feb 2023 05:30  Mg     2.0     02-15    TPro  6.1  /  Alb  3.1<L>  /  TBili  0.5  /  DBili  x   /  AST  9<L>  /  ALT  <5<L>  /  AlkPhos  56  02-15

## 2023-02-16 NOTE — PROGRESS NOTE ADULT - PROBLEM SELECTOR PLAN 6
TSH WNL   - No medications at this time Hx of paranoid delusions including "they put sedatives in my medicine from the pharmacy", "there is a man who bangs around my apartment", and "all this started when I had an device placed by my dentist in 2005, maybe it's monitoring my brain or tracking me I don't know."  -Utox negative  -Anurag consulted - no further psych workup needed at this time. If pt becomes agitated can give haldol 2mg PO/IM q6h PRN     F: None  E: Replete if K<4 or Mag<2  N: DASH Diet  VTEppx: Lovenox SQ  Dispo: Cardiac tele  PT: JAZLYN, pending placement Hx of paranoid delusions including "they put sedatives in my medicine from the pharmacy", "there is a man who bangs around my apartment", and "all this started when I had an device placed by my dentist in 2005, maybe it's monitoring my brain or tracking me I don't know."  -Utox negative  -Anurag consulted - no further psych workup needed at this time. If pt becomes agitated can give haldol 2mg PO/IM q6h PRN     F: None  E: Replete if K<4 or Mag<2  N: DASH Diet  VTEppx: Lovenox SQ  Dispo: Cardiac tele  PT: JAZLYN, pending auth

## 2023-02-16 NOTE — PROGRESS NOTE ADULT - PROBLEM SELECTOR PLAN 7
Hx of paranoid delusions including "they put sedatives in my medicine from the pharmacy", "there is a man who bangs around my apartment", and "all this started when I had an device placed by my dentist in 2005, maybe it's monitoring my brain or tracking me I don't know."  -Utox negative  -Anurag consulted - no further psych workup needed at this time. If pt becomes agitated can give haldol 2mg PO/IM q6h PRN     F: None  E: Replete if K<4 or Mag<2  N: DASH Diet  VTEppx: Lovenox SQ  Dispo: Cardiac tele  PT: JAZLYN, pending placement

## 2023-02-16 NOTE — CONSULT NOTE ADULT - SUBJECTIVE AND OBJECTIVE BOX
Attending: Dr. Macario    Patient is a 62y old  Female who presents with a chief complaint of Fall vs syncope (16 Feb 2023 10:43)      HPI:  61 y/o Female recent former 15-20 pack-year smoker w/ PMHx of non-compliance w/ meds 2/2 paranoid delusions breast CA , HTN, HLD, T2DM, hypothyroidism and hyponatremia, who initially presented to [facility] ED for evaluation of a fall vs syncope. Pt domiciled alone, walks w/ walker, and reports that this morning she was in her kitchen and fell to the ground knocking over her kitchen garbage can, but does not remember falling or if she tripped or got dizzy.         PAST MEDICAL & SURGICAL HISTORY:  Malignant neoplasm of female breast  Breast CA, left  Hypothyroidism  Adult hypothyroidism  Type 2 diabetes mellitus  DM type 2 (diabetes mellitus, type 2)  Essential hypertension  HTN (hypertension)  HLD (hyperlipidemia)  Former smoker  Hyponatremia  Anal fistula  Anal fistula      Other postprocedural status  History of lumpectomy of left breast        Home Medications:    Allergies    No Known Allergies    Intolerances    strawberry (Pruritus)    FAMILY HISTORY:    Social History:       LABS                        12.1   9.85  )-----------( 494      ( 16 Feb 2023 05:30 )             35.7     02-16    128<L>  |  92<L>  |  7   ----------------------------<  277<H>  4.4   |  25  |  0.70    Ca    8.3<L>      16 Feb 2023 10:00  Mg     1.5     02-16    TPro  6.1  /  Alb  3.1<L>  /  TBili  0.5  /  DBili  x   /  AST  9<L>  /  ALT  <5<L>  /  AlkPhos  56  02-15        Vital Signs Last 24 Hrs  T(C): 36.1 (16 Feb 2023 10:21), Max: 36.6 (15 Feb 2023 21:07)  T(F): 97 (16 Feb 2023 10:21), Max: 97.9 (15 Feb 2023 21:07)  HR: 80 (16 Feb 2023 12:40) (56 - 82)  BP: 113/70 (16 Feb 2023 12:40) (113/70 - 184/72)  BP(mean): 117 (15 Feb 2023 17:40) (93 - 117)  RR: 18 (16 Feb 2023 12:38) (16 - 20)  SpO2: 100% (16 Feb 2023 12:38) (95% - 100%)    Parameters below as of 16 Feb 2023 12:38  Patient On (Oxygen Delivery Method): room air        PHYSICAL EXAM  General: NAD, AA0x3    Lower Extremity Focused:  Vasc: PT/DP 2/4 b/l, CFT <3 sec x10, Edematous and erythematous right 2nd digit, no other areas of edema or erythema,   Derm: Hypertrophic, Elongated toenails x10; 2nd digit wound on dorsal distal phalanx 0.8 cm x 0.4 cm x 0.3 cm with serous drainage, no PTB, no tracking; distal aspect of 2nd digit with 0.8 cm x 0.3 cm x 0.4 cm, 3rd and 4th R distal digits with wound from pressure of nail, granular base, hyperkeratotic lesion sub met 5 Right, no open wounds left   Neuro: Protective sensation intact  MSK: Cavus foot b/l,     RADIOLOGY

## 2023-02-17 DIAGNOSIS — L03.90 CELLULITIS, UNSPECIFIED: ICD-10-CM

## 2023-02-17 LAB
ANION GAP SERPL CALC-SCNC: 11 MMOL/L — SIGNIFICANT CHANGE UP (ref 5–17)
BUN SERPL-MCNC: 10 MG/DL — SIGNIFICANT CHANGE UP (ref 7–23)
CALCIUM SERPL-MCNC: 8.7 MG/DL — SIGNIFICANT CHANGE UP (ref 8.4–10.5)
CHLORIDE SERPL-SCNC: 92 MMOL/L — LOW (ref 96–108)
CO2 SERPL-SCNC: 26 MMOL/L — SIGNIFICANT CHANGE UP (ref 22–31)
CREAT SERPL-MCNC: 0.88 MG/DL — SIGNIFICANT CHANGE UP (ref 0.5–1.3)
EGFR: 74 ML/MIN/1.73M2 — SIGNIFICANT CHANGE UP
GLUCOSE BLDC GLUCOMTR-MCNC: 147 MG/DL — HIGH (ref 70–99)
GLUCOSE BLDC GLUCOMTR-MCNC: 169 MG/DL — HIGH (ref 70–99)
GLUCOSE BLDC GLUCOMTR-MCNC: 173 MG/DL — HIGH (ref 70–99)
GLUCOSE BLDC GLUCOMTR-MCNC: 204 MG/DL — HIGH (ref 70–99)
GLUCOSE SERPL-MCNC: 149 MG/DL — HIGH (ref 70–99)
HCT VFR BLD CALC: 36.6 % — SIGNIFICANT CHANGE UP (ref 34.5–45)
HGB BLD-MCNC: 12.2 G/DL — SIGNIFICANT CHANGE UP (ref 11.5–15.5)
MAGNESIUM SERPL-MCNC: 2.2 MG/DL — SIGNIFICANT CHANGE UP (ref 1.6–2.6)
MCHC RBC-ENTMCNC: 27.6 PG — SIGNIFICANT CHANGE UP (ref 27–34)
MCHC RBC-ENTMCNC: 33.3 GM/DL — SIGNIFICANT CHANGE UP (ref 32–36)
MCV RBC AUTO: 82.8 FL — SIGNIFICANT CHANGE UP (ref 80–100)
NRBC # BLD: 0 /100 WBCS — SIGNIFICANT CHANGE UP (ref 0–0)
PLATELET # BLD AUTO: 496 K/UL — HIGH (ref 150–400)
POTASSIUM SERPL-MCNC: 3.5 MMOL/L — SIGNIFICANT CHANGE UP (ref 3.5–5.3)
POTASSIUM SERPL-SCNC: 3.5 MMOL/L — SIGNIFICANT CHANGE UP (ref 3.5–5.3)
RBC # BLD: 4.42 M/UL — SIGNIFICANT CHANGE UP (ref 3.8–5.2)
RBC # FLD: 14.4 % — SIGNIFICANT CHANGE UP (ref 10.3–14.5)
SODIUM SERPL-SCNC: 129 MMOL/L — LOW (ref 135–145)
WBC # BLD: 10.07 K/UL — SIGNIFICANT CHANGE UP (ref 3.8–10.5)
WBC # FLD AUTO: 10.07 K/UL — SIGNIFICANT CHANGE UP (ref 3.8–10.5)

## 2023-02-17 PROCEDURE — 99232 SBSQ HOSP IP/OBS MODERATE 35: CPT

## 2023-02-17 RX ORDER — POTASSIUM CHLORIDE 20 MEQ
40 PACKET (EA) ORAL ONCE
Refills: 0 | Status: COMPLETED | OUTPATIENT
Start: 2023-02-17 | End: 2023-02-17

## 2023-02-17 RX ORDER — SODIUM CHLORIDE 9 MG/ML
250 INJECTION INTRAMUSCULAR; INTRAVENOUS; SUBCUTANEOUS ONCE
Refills: 0 | Status: COMPLETED | OUTPATIENT
Start: 2023-02-17 | End: 2023-02-17

## 2023-02-17 RX ORDER — CEPHALEXIN 500 MG
500 CAPSULE ORAL
Refills: 0 | Status: DISCONTINUED | OUTPATIENT
Start: 2023-02-17 | End: 2023-02-23

## 2023-02-17 RX ADMIN — ENOXAPARIN SODIUM 40 MILLIGRAM(S): 100 INJECTION SUBCUTANEOUS at 19:04

## 2023-02-17 RX ADMIN — SPIRONOLACTONE 25 MILLIGRAM(S): 25 TABLET, FILM COATED ORAL at 06:01

## 2023-02-17 RX ADMIN — NYSTATIN CREAM 1 APPLICATION(S): 100000 CREAM TOPICAL at 06:00

## 2023-02-17 RX ADMIN — Medication 81 MILLIGRAM(S): at 11:09

## 2023-02-17 RX ADMIN — Medication 2: at 17:47

## 2023-02-17 RX ADMIN — Medication 500 MILLIGRAM(S): at 14:14

## 2023-02-17 RX ADMIN — CARVEDILOL PHOSPHATE 25 MILLIGRAM(S): 80 CAPSULE, EXTENDED RELEASE ORAL at 11:10

## 2023-02-17 RX ADMIN — LISINOPRIL 20 MILLIGRAM(S): 2.5 TABLET ORAL at 06:01

## 2023-02-17 RX ADMIN — Medication 500 MILLIGRAM(S): at 19:04

## 2023-02-17 RX ADMIN — Medication 40 MILLIEQUIVALENT(S): at 11:09

## 2023-02-17 RX ADMIN — Medication 4: at 12:23

## 2023-02-17 NOTE — PROGRESS NOTE ADULT - PROBLEM SELECTOR PLAN 7
Hx of paranoid delusions including "they put sedatives in my medicine from the pharmacy", "there is a man who bangs around my apartment", and "all this started when I had an device placed by my dentist in 2005, maybe it's monitoring my brain or tracking me I don't know."  -Utox negative  -Anurag consulted - no further psych workup needed at this time.   -PATIENT REMAINS COOPERATIVE AND AGREEABLE TO CARE. HAS NOT EXHIBITED AGITATION HOWEVER If pt becomes agitated can give haldol 2mg PO/IM q6h PRN     F: None  E: Replete if K<4 or Mag<2  N: DASH Diet  VTEppx: Lovenox SQ  Dispo: Cardiac tele  PT: Recommended for [facility], however difficulty in obtaining acceptance. Spoke with [facility] [phone] regarding patient's retracted acceptance and per [facility] patient is unlikely to be re-accepted. [facility] requesting 72 hours of nursing documentation regarding patient's behavior.

## 2023-02-17 NOTE — PROGRESS NOTE ADULT - ASSESSMENT
62F, former smoker, PMhx of medication non compliance 2/2 paranoid delusions, HTN, HLD, DM-II, Hypothyroidism, Hyponatremia (requiring MICU admission 11/2022) and Breast CA, who presented to [facility] ED for eval of fall vs syncope i/s/o poor PO intake. Pt found to have elevated troponin and abnormal CCTA, plan for medical management 2/2 poor compliance. Pt now pending [facility] auth.

## 2023-02-17 NOTE — PROGRESS NOTE ADULT - NSPROGADDITIONALINFOA_GEN_ALL_CORE
Attending Attestation:  I was physically present for the key portions of the evaluation and management (E/M) service provided.  I agree with the above history, physical, and plan which I have reviewed with the following edits/addendum:    62F former smoker, hx of breast CA, HTN, HLP, DMT2, hypothyroidism, hypoosm hyponatremia who presented w an episode of lightheadedness, imbalance and severe weakness causing a fall. She denies LOC. She has not been taking any medications due to paranoia about healthcare. She did report not having been eating and drinking enough recently. 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  - orthostatic hypotension likely cause of fall. encouraged proper hydration and nutrition, knee high compression socks to prevent recurrence  - ECG nonischemic w wandering pacer, other ECGs w PACs coming in [facility]-- not Afib. No indication for AC.  - TTE 2/13/23 normal biV function with basal inferior, inferoseptal WMA   -CCTA 2/13/23 severe midLAD and RPL stenosis and moderate LCx disease. D/w pt at length r/b of cor angiography and PCI. D/w IC Dr Taylor-- given she did not present w an ACS, has no symptoms and is not in heart failure, we will pursue a conservative medical approach for safety. She has a hx of not taking medications related to trust issues with pharma/medical system  - HTN: coreg, lisinopril and MRA. may continue adjusting doses as outpatient. avoiding to overshoot given orthostatic issues    Mynor Roberto MD  Cardiology    35 minutes spent on total encounter; more than 50% of the visit was spent counseling and/or coordinating care by the attending physician.
Attending Attestation:  I was physically present for the key portions of the evaluation and management (E/M) service provided.  I agree with the above history, physical, and plan which I have reviewed.  Mynor Roberto MD (Cardiology)  See HnP attestation. Initial encounter date 2/13/23
Attending Attestation:  I was physically present for the key portions of the evaluation and management (E/M) service provided.  I agree with the above history, physical, and plan which I have reviewed with the following edits/addendum:    62F former smoker, hx of breast CA, HTN, HLP, DMT2, hypothyroidism, hypoosm hyponatremia who presented w an episode of lightheadedness, imbalance and severe weakness causing a fall. She denies LOC. She has not been taking any medications due to paranoia about healthcare. She did report not having been eating and drinking enough recently. 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  - initial labs remarkable for leukocytosis 13.2, hypokalemia 2.6, hyponatremia 128, hypoalbuminemia 2.8  - given 1 L IVF in ED and correction for electrolyte derangements  - trop T mildly elevated 0.06, flat and suspected syncope led to admission to telemetry  - ECG nonischemic w wandering pacer, other ECGs w PACs coming in [facility]-- not Afib. No indication for AC.  - TTE 2/13/23 normal biV function with basal inferior, inferoseptal WMA   -CCTA 2/13/23 severe midLAD and RPL stenosis and moderate LCx disease. D/w pt at length r/b of cor angiography and PCI. She is unsure with undergoing invasive cath. D/w IC Dr Taylor-- given she did not present w an ACS, has no symptoms and is not in heart failure, we will pursue a conservative medical approach for safety. She has a hx of not taking medications related to trust issues with pharma/medical system.  -increase lisinopril dose for better BP control. continue melody 25 and add coreg 12.5.    Mynor Roberto MD  Cardiology    35 minutes spent on total encounter; more than 50% of the visit was spent counseling and/or coordinating care by the attending physician.
Attending Attestation:  I was physically present for the key portions of the evaluation and management (E/M) service provided.  I agree with the above history, physical, and plan which I have reviewed with the following edits/addendum:    62F former smoker, hx of breast CA, HTN, HLP, DMT2, hypothyroidism, hypoosm hyponatremia who presented w an episode of lightheadedness, imbalance and severe weakness causing a fall. She denies LOC. She has not been taking any medications due to paranoia about healthcare. She did report poor eating and drinking recently with having "too much on her plate". 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  - orthostatic hypotension likely cause of fall. encouraged proper hydration and nutrition, knee high compression socks to prevent recurrence  - ECG nonischemic w wandering pacer, other ECGs w PACs coming in [facility]-- not Afib. No indication for AC.  - TTE 2/13/23 normal biV function with basal inferior, inferoseptal WMA   - CCTA 2/13/23 severe midLAD and RPL stenosis and moderate LCx disease. D/w pt at length r/b of cor angiography and PCI. D/w IC Dr Taylor-- given she did not present w an ACS, has no symptoms and is not in heart failure, we will pursue a conservative medical approach for safety. She has a hx of not taking medications related to trust issues with pharma/medical system  - HTN: coreg, lisinopril and MRA. may continue adjusting doses as outpatient. Avoiding to overshoot given orthostatic issues which is better but still present  - podiatry debrided toes, now much better but w open wounds w cellulitis on R foot (will be dcd on a course of antibx). This may be contributing to mobility issues  - PT recs [facility] but ongoing issues w acceptance. Pt is not psychotic, just quirky. Psych to re-see pt and provide an assessment.  - Pt agrees on placement even if it's in [place]. She lives alone and going home is not safe at this time. SW will communicate to accepting facility to refer her for APS upon dc from [facility].    Mynor Roberto MD  Cardiology    35 minutes spent on total encounter; more than 50% of the visit was spent counseling and/or coordinating care by the attending physician.
Attending Attestation:  I was physically present for the key portions of the evaluation and management (E/M) service provided.  I agree with the above history, physical, and plan which I have reviewed with the following edits/addendum:    62F former smoker, hx of breast CA, HTN, HLP, DMT2, hypothyroidism, hypoosm hyponatremia who presented w an episode of lightheadedness, imbalance and severe weakness causing a fall. She denies LOC. She has not been taking any medications due to paranoia about healthcare. She did report poor eating and drinking recently with having "too much on her plate". 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  - orthostatic hypotension likely cause of fall. encouraged proper hydration and nutrition, knee high compression socks to prevent recurrence  - ECG nonischemic w wandering pacer, other ECGs w PACs coming in [facility]-- not Afib. No indication for AC.  - TTE 2/13/23 normal biV function with basal inferior, inferoseptal WMA   - CCTA 2/13/23 severe midLAD and RPL stenosis and moderate LCx disease. D/w pt at length r/b of cor angiography and PCI. D/w IC Dr Taylor-- given she did not present w an ACS, has no symptoms and is not in heart failure, we will pursue a conservative medical approach for safety. She has a hx of not taking medications related to trust issues with pharma/medical system  - HTN: coreg, lisinopril and MRA. may continue adjusting doses as outpatient. avoiding to overshoot given orthostatic issues which is still present today  - podiatry consult. Toe nails may be contributing to mobility issues  - PT recs [facility] but ongoing issues w acceptance. Pt is not psychotic, just quirky.    Mynor Roberto MD  Cardiology    35 minutes spent on total encounter; more than 50% of the visit was spent counseling and/or coordinating care by the attending physician.

## 2023-02-17 NOTE — PROGRESS NOTE ADULT - PROBLEM SELECTOR PLAN 4
T chol 173, , HDL 30  -Continue Atorvastatin 40mg QD Orthostatic from lying to sitting position, but SBP remains stable   -Continue Spironolactone 25mg QD, Lisinopril 20mg QD and Coreg 25mg QD

## 2023-02-17 NOTE — PROGRESS NOTE ADULT - PROBLEM SELECTOR PLAN 3
SBP stable, orthostatic at times   -Continue Spironolactone 25mg QD, Lisinopril 20mg QD and Coreg 25mg QD Pt has cellulitis of L 2nd digit.   L toe cellulitis  -Seen by podiatry 2/17 s/p debridement of nails x 10  -Excisional debridement of hyperkeratotic lesion down to level and including epidermis with scalpel.   -Started on Kelfex 500 mg four times daily x 10 days

## 2023-02-17 NOTE — PROGRESS NOTE ADULT - PROBLEM SELECTOR PLAN 1
Patient presents with unwitnessed fall vs syncope - reports generalized weakness/fatigue for the past few weeks and recent poor intake.   -Remains orthostatic in lying to sitting position (26 mmHg reduction in SBP)  -Continue daily orthostatics, repeat NS bolus today 2/17, f/u repeat in AM  -Low BUN, Hyponatremia and Hypokalemia on presentation 2/2 poor PO intake  -Carotid US: Moderate amount of plaque. No significant stenosis  -CTH: no intracranial hemorrhage or fracture  -Continue to encourage PO intake

## 2023-02-17 NOTE — PROGRESS NOTE ADULT - PROBLEM SELECTOR PLAN 2
CP free and HD stable  -Trop 0.06 > 0.05  -EKG: NSR w/ PACs, non ischemic  -TTE 2/13: LVEF normal w/ RWMA, basal inferoseptal and basal inferior are abnormal, G1DD, no valvular disease  -CCTA 2/13: Ca score 889, severe mLAD and RPL stenosis, mod mLCx.  -Cardiac cath deferred 2/2 poor medication follow up, c/w medical management  -Continue ASA 81mg QD and Lipitor 40mg QD

## 2023-02-17 NOTE — PROGRESS NOTE ADULT - PROBLEM SELECTOR PLAN 6
Hx of paranoid delusions including "they put sedatives in my medicine from the pharmacy", "there is a man who bangs around my apartment", and "all this started when I had an device placed by my dentist in 2005, maybe it's monitoring my brain or tracking me I don't know."  -Utox negative  -Anurag consulted - no further psych workup needed at this time.   -PATIENT REMAINS COOPERATIVE AND AGREEABLE TO CARE. HAS NOT EXHIBITED AGITATION HOWEVER If pt becomes agitated can give haldol 2mg PO/IM q6h PRN     F: None  E: Replete if K<4 or Mag<2  N: DASH Diet  VTEppx: Lovenox SQ  Dispo: Cardiac tele  PT: Recommended for [facility], however difficulty in obtaining acceptance. Spoke with [facility] [phone] regarding patient's retracted acceptance and per [facility] patient is unlikely to be re-accepted. [facility] requesting 72 hours of nursing documentation regarding patient's behavior. A1c 7.8  -Continue mISS  -Holding Glimepiride and Metformin

## 2023-02-17 NOTE — PROGRESS NOTE ADULT - PROBLEM SELECTOR PLAN 5
A1c 7.8  -Continue mISS  -Holding Glimepiride and Metformin T chol 173, , HDL 30  -Continue Atorvastatin 40mg QD

## 2023-02-17 NOTE — PROGRESS NOTE ADULT - SUBJECTIVE AND OBJECTIVE BOX
Interventional Cardiology PA Adult Progress Note    C.C.:     Subjective Assessment:      ROS Negative except as per Subjective and HPI  	  MEDICATIONS:  carvedilol 25 milliGRAM(s) Oral every 12 hours  lisinopril 20 milliGRAM(s) Oral daily  spironolactone 25 milliGRAM(s) Oral daily            dextrose Oral Gel 15 Gram(s) Oral once PRN  insulin lispro (ADMELOG) corrective regimen sliding scale   SubCutaneous three times a day before meals  insulin lispro (ADMELOG) corrective regimen sliding scale   SubCutaneous at bedtime    aspirin enteric coated 81 milliGRAM(s) Oral daily  enoxaparin Injectable 40 milliGRAM(s) SubCutaneous every 24 hours  influenza   Vaccine 0.5 milliLiter(s) IntraMuscular once  nystatin Powder 1 Application(s) Topical every 12 hours      	    [PHYSICAL EXAM:  TELEMETRY:  T(C): 37 (02-17-23 @ 04:46), Max: 37 (02-17-23 @ 04:46)  HR: 54 (02-17-23 @ 06:00) (54 - 82)  BP: 137/78 (02-17-23 @ 06:00) (113/70 - 184/72)  RR: 18 (02-17-23 @ 06:00) (18 - 18)  SpO2: 97% (02-17-23 @ 06:00) (96% - 100%)  Wt(kg): --  I&O's Summary    16 Feb 2023 07:01  -  17 Feb 2023 07:00  --------------------------------------------------------  IN: 420 mL / OUT: 0 mL / NET: 420 mL        Harman:  Central/PICC/Mid Line:                                         Appearance: Normal	  HEENT:   Normal oral mucosa, PERRL, EOMI	  Neck: Supple, + JVD/ - JVD; Carotid Bruit   Cardiovascular: Normal S1 S2, No JVD, No murmurs,   Respiratory: Lungs clear to auscultation/Decreased Breath Sounds/No Rales, Rhonchi, Wheezing	  Gastrointestinal:  Soft, Non-tender, + BS	  Skin: No rashes, No ecchymoses, No cyanosis  Extremities: Normal range of motion, No clubbing, cyanosis or edema  Vascular: Peripheral pulses palpable 2+ bilaterally  Neurologic: Non-focal  Psychiatry: A & O x 3, Mood & affect appropriate      	    ECG:  	  RADIOLOGY:   DIAGNOSTIC TESTING:  [ ] Echocardiogram:  [ ]  Catheterization:  [ ] Stress Test:    [ ] JAYLA  OTHER: 	    LABS:	 	  CARDIAC MARKERS:                                  12.2   10.07 )-----------( 496      ( 17 Feb 2023 07:31 )             36.6     02-16    128<L>  |  92<L>  |  7   ----------------------------<  277<H>  4.4   |  25  |  0.70    Ca    8.3<L>      16 Feb 2023 10:00  Mg     1.5     02-16      proBNP:   Lipid Profile:   HgA1c:   TSH:       ASSESSMENT/PLAN: 	        DVT ppx:  Dispo:     Interventional Cardiology PA Adult Progress Note    Subjective Assessment: Patient seen and examined at bedside. Patient reports she wants to work with PT more to get stronger and is nervous about going to [facility].     ROS Negative except as per Subjective and HPI  Overnight events reviewed, bradycardic on telemetry   	  MEDICATIONS:  carvedilol 25 milliGRAM(s) Oral every 12 hours  lisinopril 20 milliGRAM(s) Oral daily  spironolactone 25 milliGRAM(s) Oral daily  aspirin enteric coated 81 milliGRAM(s) Oral daily  enoxaparin Injectable 40 milliGRAM(s) SubCutaneous every 24 hours  influenza   Vaccine 0.5 milliLiter(s) IntraMuscular once  nystatin Powder 1 Application(s) Topical every 12 hours    PHYSICAL EXAM:  TELEMETRY:  T(C): 37 (02-17-23 @ 04:46), Max: 37 (02-17-23 @ 04:46)  HR: 54 (02-17-23 @ 06:00) (54 - 82)  BP: 137/78 (02-17-23 @ 06:00) (113/70 - 184/72)  RR: 18 (02-17-23 @ 06:00) (18 - 18)  SpO2: 97% (02-17-23 @ 06:00) (96% - 100%)  Wt(kg): --  I&O's Summary    16 Feb 2023 07:01  -  17 Feb 2023 07:00  --------------------------------------------------------  IN: 420 mL / OUT: 0 mL / NET: 420 mL                                     Appearance: Normal	  HEENT: Normal oral mucosa, PERRL, EOMI	  Neck: Supple, - JVD; no Carotid Bruit   Cardiovascular: Normal S1 S2, No murmurs  Respiratory: Lungs clear to auscultation/Decreased Breath Sounds/No Rales, Rhonchi, Wheezing	  Gastrointestinal:  Soft, Non-tender, + BS	  Skin: No rashes, No ecchymoses, No cyanosis  Extremities: Normal range of motion, No clubbing, cyanosis or edema  Vascular: Peripheral pulses palpable 2+ bilaterally  Neurologic: Non-focal  Psychiatry: A & O x 3, Mood & affect appropriate    LABS:	 	             12.2   10.07 )-----------( 496      ( 17 Feb 2023 07:31 )             36.6     02-16    128<L>  |  92<L>  |  7   ----------------------------<  277<H>  4.4   |  25  |  0.70    Ca    8.3<L>      16 Feb 2023 10:00  Mg     1.5     02-16

## 2023-02-18 LAB
ANION GAP SERPL CALC-SCNC: 9 MMOL/L — SIGNIFICANT CHANGE UP (ref 5–17)
BUN SERPL-MCNC: 13 MG/DL — SIGNIFICANT CHANGE UP (ref 7–23)
CALCIUM SERPL-MCNC: 8.9 MG/DL — SIGNIFICANT CHANGE UP (ref 8.4–10.5)
CHLORIDE SERPL-SCNC: 97 MMOL/L — SIGNIFICANT CHANGE UP (ref 96–108)
CO2 SERPL-SCNC: 26 MMOL/L — SIGNIFICANT CHANGE UP (ref 22–31)
CREAT SERPL-MCNC: 0.95 MG/DL — SIGNIFICANT CHANGE UP (ref 0.5–1.3)
EGFR: 67 ML/MIN/1.73M2 — SIGNIFICANT CHANGE UP
GLUCOSE BLDC GLUCOMTR-MCNC: 152 MG/DL — HIGH (ref 70–99)
GLUCOSE BLDC GLUCOMTR-MCNC: 166 MG/DL — HIGH (ref 70–99)
GLUCOSE BLDC GLUCOMTR-MCNC: 174 MG/DL — HIGH (ref 70–99)
GLUCOSE BLDC GLUCOMTR-MCNC: 243 MG/DL — HIGH (ref 70–99)
GLUCOSE SERPL-MCNC: 163 MG/DL — HIGH (ref 70–99)
HCT VFR BLD CALC: 38.3 % — SIGNIFICANT CHANGE UP (ref 34.5–45)
HGB BLD-MCNC: 12.6 G/DL — SIGNIFICANT CHANGE UP (ref 11.5–15.5)
MAGNESIUM SERPL-MCNC: 1.9 MG/DL — SIGNIFICANT CHANGE UP (ref 1.6–2.6)
MCHC RBC-ENTMCNC: 27.5 PG — SIGNIFICANT CHANGE UP (ref 27–34)
MCHC RBC-ENTMCNC: 32.9 GM/DL — SIGNIFICANT CHANGE UP (ref 32–36)
MCV RBC AUTO: 83.4 FL — SIGNIFICANT CHANGE UP (ref 80–100)
NRBC # BLD: 0 /100 WBCS — SIGNIFICANT CHANGE UP (ref 0–0)
PLATELET # BLD AUTO: 451 K/UL — HIGH (ref 150–400)
POTASSIUM SERPL-MCNC: 4.3 MMOL/L — SIGNIFICANT CHANGE UP (ref 3.5–5.3)
POTASSIUM SERPL-SCNC: 4.3 MMOL/L — SIGNIFICANT CHANGE UP (ref 3.5–5.3)
RBC # BLD: 4.59 M/UL — SIGNIFICANT CHANGE UP (ref 3.8–5.2)
RBC # FLD: 14.9 % — HIGH (ref 10.3–14.5)
SODIUM SERPL-SCNC: 132 MMOL/L — LOW (ref 135–145)
WBC # BLD: 9.03 K/UL — SIGNIFICANT CHANGE UP (ref 3.8–10.5)
WBC # FLD AUTO: 9.03 K/UL — SIGNIFICANT CHANGE UP (ref 3.8–10.5)

## 2023-02-18 PROCEDURE — 99232 SBSQ HOSP IP/OBS MODERATE 35: CPT

## 2023-02-18 RX ORDER — ATORVASTATIN CALCIUM 80 MG/1
40 TABLET, FILM COATED ORAL AT BEDTIME
Refills: 0 | Status: DISCONTINUED | OUTPATIENT
Start: 2023-02-18 | End: 2023-02-23

## 2023-02-18 RX ADMIN — Medication 500 MILLIGRAM(S): at 17:18

## 2023-02-18 RX ADMIN — Medication 500 MILLIGRAM(S): at 05:52

## 2023-02-18 RX ADMIN — ATORVASTATIN CALCIUM 40 MILLIGRAM(S): 80 TABLET, FILM COATED ORAL at 21:26

## 2023-02-18 RX ADMIN — Medication 500 MILLIGRAM(S): at 12:37

## 2023-02-18 RX ADMIN — Medication 2: at 17:17

## 2023-02-18 RX ADMIN — LISINOPRIL 20 MILLIGRAM(S): 2.5 TABLET ORAL at 05:52

## 2023-02-18 RX ADMIN — Medication 81 MILLIGRAM(S): at 12:38

## 2023-02-18 RX ADMIN — Medication 4: at 12:37

## 2023-02-18 RX ADMIN — CARVEDILOL PHOSPHATE 25 MILLIGRAM(S): 80 CAPSULE, EXTENDED RELEASE ORAL at 05:52

## 2023-02-18 RX ADMIN — Medication 500 MILLIGRAM(S): at 00:24

## 2023-02-18 RX ADMIN — ENOXAPARIN SODIUM 40 MILLIGRAM(S): 100 INJECTION SUBCUTANEOUS at 19:14

## 2023-02-18 RX ADMIN — SPIRONOLACTONE 25 MILLIGRAM(S): 25 TABLET, FILM COATED ORAL at 05:52

## 2023-02-18 RX ADMIN — NYSTATIN CREAM 1 APPLICATION(S): 100000 CREAM TOPICAL at 19:14

## 2023-02-18 RX ADMIN — CARVEDILOL PHOSPHATE 25 MILLIGRAM(S): 80 CAPSULE, EXTENDED RELEASE ORAL at 00:40

## 2023-02-18 RX ADMIN — CARVEDILOL PHOSPHATE 25 MILLIGRAM(S): 80 CAPSULE, EXTENDED RELEASE ORAL at 17:18

## 2023-02-18 NOTE — PROGRESS NOTE ADULT - SUBJECTIVE AND OBJECTIVE BOX
Interventional Cardiology PA Adult Progress Note    Subjective Assessment: Pt feels well this morning without acute complaints. No overnight events. Pt understands plan to continue seeking acceptance to [facility] facilities.   	  MEDICATIONS:  carvedilol 25 milliGRAM(s) Oral every 12 hours  lisinopril 20 milliGRAM(s) Oral daily  spironolactone 25 milliGRAM(s) Oral daily  cephalexin 500 milliGRAM(s) Oral four times a day  dextrose Oral Gel 15 Gram(s) Oral once PRN  insulin lispro (ADMELOG) corrective regimen sliding scale   SubCutaneous three times a day before meals  insulin lispro (ADMELOG) corrective regimen sliding scale   SubCutaneous at bedtime  aspirin enteric coated 81 milliGRAM(s) Oral daily  enoxaparin Injectable 40 milliGRAM(s) SubCutaneous every 24 hours  influenza   Vaccine 0.5 milliLiter(s) IntraMuscular once  nystatin Powder 1 Application(s) Topical every 12 hours  sodium chloride 0.9% Bolus 250 milliLiter(s) IV Bolus once      PHYSICAL EXAM:  TELEMETRY:  T(C): 36.4 (02-18-23 @ 05:10), Max: 36.6 (02-17-23 @ 17:15)  HR: 50 (02-18-23 @ 05:49) (50 - 60)  BP: 170/75 (02-18-23 @ 05:49) (158/74 - 178/83)  RR: 18 (02-18-23 @ 05:49) (17 - 18)  SpO2: 97% (02-18-23 @ 05:49) (96% - 98%)  Wt(kg): --  I&O's Summary    17 Feb 2023 07:01  -  18 Feb 2023 07:00  --------------------------------------------------------  IN: 450 mL / OUT: 950 mL / NET: -500 mL      Appearance: Normal	  HEENT: Normal oral mucosa, PERRL, EOMI	  Neck: Supple, - JVD; no Carotid Bruit   Cardiovascular: Normal S1 S2, No murmurs  Respiratory: Lungs clear to auscultation/Decreased Breath Sounds/No Rales, Rhonchi, Wheezing	  Gastrointestinal:  Soft, Non-tender, + BS	  Skin: No rashes, No ecchymoses, No cyanosis  Extremities: Normal range of motion, No clubbing, cyanosis or edema  Vascular: Peripheral pulses palpable 2+ bilaterally  Neurologic: Non-focal  Psychiatry: A & O x 3, Mood & affect appropriate      LABS:	 	  CARDIAC MARKERS:                        12.6   9.03  )-----------( 451      ( 18 Feb 2023 08:38 )             38.3     129<L>  |  92<L>  |  10  ----------------------------<  149<H>  3.5   |  26  |  0.88    Ca    8.7      17 Feb 2023 07:31    Mg     2.2     02-17

## 2023-02-18 NOTE — PROGRESS NOTE ADULT - PROBLEM SELECTOR PLAN 7
Hx of paranoid delusions including "they put sedatives in my medicine from the pharmacy", "there is a man who bangs around my apartment", and "all this started when I had an device placed by my dentist in 2005, maybe it's monitoring my brain or tracking me I don't know."  -Utox negative  -Anurag consulted - no further psych workup needed at this time.   -PATIENT REMAINS COOPERATIVE AND AGREEABLE TO CARE. HAS NOT EXHIBITED AGITATION HOWEVER If pt becomes agitated can give haldol 2mg PO/IM q6h PRN     F: None  E: Replete if K<4 or Mag<2  N: DASH Diet  VTEppx: Lovenox SQ  Dispo: Cardiac tele  PT: Recommended for [facility], however difficulty in obtaining acceptance. Spoke with [facility] [phone] regarding patient's retracted acceptance and per [facility] patient is unlikely to be re-accepted. [facility] requesting 72 hours of nursing documentation regarding patient's behavior.

## 2023-02-18 NOTE — PROGRESS NOTE ADULT - PROBLEM SELECTOR PLAN 2
- CP free and HD stable; Pt had (+) Trop 0.06 > 0.05t.   - TTE (2/13/23): LVEF 50-55% w/ RWMA (basal inferoseptal and basal inferior segments), G I DD, no valvulopathies.   - CCTA (2/13/23): Ca score 889, severe mLAD, severe RPL, moderate mLCx.   - Cardiac cath deferred 2/2 poor medication compliance / poor medical follow up; CONT w/ medical management.   - CONT: ASA     -Cardiac cath deferred 2/2 poor medication follow up, c/w medical management  -Continue ASA 81mg QD and Lipitor 40mg QD - CP free and HD stable; Pt had (+) Trop 0.06 > 0.05t.   - TTE (2/13/23): LVEF 50-55% w/ RWMA (basal inferoseptal and basal inferior segments), G I DD, no valvulopathies.   - CCTA (2/13/23): Ca score 889, severe mLAD, severe RPL, moderate mLCx.   - Cardiac cath deferred 2/2 poor medication compliance / poor medical follow up; CONT w/ medical management.   - CONT: ASA 81mg PO QD, Atorvastatin 40mg PO QHS, Coreg 25mg PO BID.

## 2023-02-18 NOTE — PROGRESS NOTE ADULT - PROBLEM SELECTOR PLAN 3
Pt has cellulitis of L 2nd digit.   L toe cellulitis  -Seen by podiatry 2/17 s/p debridement of nails x 10  -Excisional debridement of hyperkeratotic lesion down to level and including epidermis with scalpel.   -Started on Kelfex 500 mg four times daily x 10 days - cellulitis of L 2nd toe; seen by podiatry 2/17/23 s/p debridement of nails x10.   - CONT: Keflex 500mg PO QID x10d.

## 2023-02-18 NOTE — PROGRESS NOTE ADULT - PROBLEM SELECTOR PLAN 4
Orthostatic from lying to sitting position, but SBP remains stable   -Continue Spironolactone 25mg QD, Lisinopril 20mg QD and Coreg 25mg QD - CONT: Lisinopril 20mg PO QD, Carvedilol 25mg PO BID

## 2023-02-18 NOTE — PROGRESS NOTE ADULT - PROBLEM SELECTOR PLAN 6
A1c 7.8  -Continue mISS  -Holding Glimepiride and Metformin - A1c 7.8  - CONT: mISS  - HOLD home Glimepiride and Metformin.

## 2023-02-18 NOTE — PROGRESS NOTE ADULT - PROBLEM SELECTOR PLAN 1
- Unwitnessed fall vs syncope; pt w/ generalized weakness/fatigue for past few weeks and poor PO intake.   - Low BUN, hypoNa and hypoK on admission 2/2 poor PO intake.   - Carotid US: moderate amount of plaque; no significant stenosis.   - CTA: no ICH or fracture.   - Pt remains orthostatic in lying to sitting position; continue daily orthostatics and consider NS bolus is indicated. - Unwitnessed fall vs syncope; pt w/ generalized weakness/fatigue for past few weeks and poor PO intake.   - Low BUN, hypoNa and hypoK on admission 2/2 poor PO intake.   - Carotid US: moderate amount of plaque; no significant stenosis.   - CTA: no ICH or fracture.   - Pt was orthostatic 2/17/23 and received IVF.   - Orthostatic (-) 2/18/23.

## 2023-02-18 NOTE — CHART NOTE - NSCHARTNOTEFT_GEN_A_CORE
Called to bedside by RN  Patient was using toilet and when attempting to stand her legs got weak and she sat on the floor.  There was no fall or trauma  No LOC or head trauma.  Patient denies any complaints.

## 2023-02-18 NOTE — PROGRESS NOTE ADULT - ASSESSMENT
62F, former smoker, PMhx of medication non compliance 2/2 paranoid delusions, HTN, HLD, DM-II, Hypothyroidism, Hyponatremia (requiring MICU admission 11/2022) and Breast CA, who presented to [facility] ED for eval of fall vs syncope i/s/o poor PO intake. Pt found to have elevated troponin and abnormal CCTA, plan for medical management 2/2 poor compliance. Pt now pending [facility] auth. Pt has been orthostatic, s/p IVF 2/17/23; will recheck orthostatics today 2/18/23.

## 2023-02-18 NOTE — PROGRESS NOTE ADULT - PROBLEM SELECTOR PLAN 5
T chol 173, , HDL 30  -Continue Atorvastatin 40mg QD - CHOL: 173, HDL 30, .  - CONT: Atorvastatin 40mg QD

## 2023-02-19 LAB
GLUCOSE BLDC GLUCOMTR-MCNC: 137 MG/DL — HIGH (ref 70–99)
GLUCOSE BLDC GLUCOMTR-MCNC: 138 MG/DL — HIGH (ref 70–99)
GLUCOSE BLDC GLUCOMTR-MCNC: 203 MG/DL — HIGH (ref 70–99)
GLUCOSE BLDC GLUCOMTR-MCNC: 269 MG/DL — HIGH (ref 70–99)

## 2023-02-19 PROCEDURE — 99232 SBSQ HOSP IP/OBS MODERATE 35: CPT

## 2023-02-19 RX ADMIN — ATORVASTATIN CALCIUM 40 MILLIGRAM(S): 80 TABLET, FILM COATED ORAL at 22:32

## 2023-02-19 RX ADMIN — CARVEDILOL PHOSPHATE 25 MILLIGRAM(S): 80 CAPSULE, EXTENDED RELEASE ORAL at 12:03

## 2023-02-19 RX ADMIN — SPIRONOLACTONE 25 MILLIGRAM(S): 25 TABLET, FILM COATED ORAL at 06:16

## 2023-02-19 RX ADMIN — Medication 6: at 12:03

## 2023-02-19 RX ADMIN — Medication 81 MILLIGRAM(S): at 12:02

## 2023-02-19 RX ADMIN — Medication 500 MILLIGRAM(S): at 00:11

## 2023-02-19 RX ADMIN — NYSTATIN CREAM 1 APPLICATION(S): 100000 CREAM TOPICAL at 18:04

## 2023-02-19 RX ADMIN — Medication 500 MILLIGRAM(S): at 12:03

## 2023-02-19 RX ADMIN — Medication 500 MILLIGRAM(S): at 06:15

## 2023-02-19 RX ADMIN — NYSTATIN CREAM 1 APPLICATION(S): 100000 CREAM TOPICAL at 06:18

## 2023-02-19 RX ADMIN — ENOXAPARIN SODIUM 40 MILLIGRAM(S): 100 INJECTION SUBCUTANEOUS at 18:03

## 2023-02-19 RX ADMIN — Medication 500 MILLIGRAM(S): at 18:03

## 2023-02-19 RX ADMIN — LISINOPRIL 20 MILLIGRAM(S): 2.5 TABLET ORAL at 06:15

## 2023-02-19 NOTE — PROGRESS NOTE ADULT - ASSESSMENT
62F, former smoker, PMhx of medication non compliance 2/2 paranoid delusions, HTN, HLD, DM-II, Hypothyroidism, Hyponatremia (requiring MICU admission 11/2022) and Breast CA, who presented to [facility] ED for eval of fall vs syncope i/s/o poor PO intake. Pt found to have elevated troponin and abnormal CCTA, plan for medical management 2/2 poor compliance. Pt now pending [facility] auth. Pt has been orthostatic, s/p IVF 2/17/23; will recheck orthostatics today 2/19/2023.    62F, former smoker, PMhx of medication non compliance 2/2 paranoid delusions, HTN, HLD, DM-II, Hypothyroidism, Hyponatremia (requiring MICU admission 11/2022) and Breast CA, who presented to [facility] ED for eval of fall vs syncope i/s/o poor PO intake. Pt found to have elevated troponin and abnormal CCTA, plan for medical management 2/2 poor compliance. Pt now pending [facility] auth. Pt has been orthostatic, s/p IVF 2/17/23; now negative.    62F, former smoker, PMhx of medication non compliance 2/2 paranoid delusions, HTN, HLD, DM-II, Hypothyroidism, Hyponatremia (requiring MICU admission 11/2022) and Breast CA, who presented to [facility] ED for eval of fall vs syncope i/s/o poor PO intake. Pt found to have elevated troponin and abnormal CCTA, plan for medical management 2/2 poor compliance. Pt now pending [facility]N auth.

## 2023-02-19 NOTE — PROGRESS NOTE ADULT - PROBLEM SELECTOR PLAN 2
- CP free and HD stable; Pt had (+) Trop 0.06 > 0.05t.   - TTE (2/13/23): LVEF 50-55% w/ RWMA (basal inferoseptal and basal inferior segments), G I DD, no valvulopathies.   - CCTA (2/13/23): Ca score 889, severe mLAD, severe RPL, moderate mLCx.   - Cardiac cath deferred 2/2 poor medication compliance / poor medical follow up; CONT w/ medical management.   - CONT: ASA 81mg PO QD, Atorvastatin 40mg PO QHS, Coreg 25mg PO BID.

## 2023-02-19 NOTE — PROGRESS NOTE ADULT - PROBLEM SELECTOR PLAN 3
- cellulitis of L 2nd toe; seen by podiatry 2/17/23 s/p debridement of nails x10.   - CONT: Keflex 500mg PO QID x10d (last day: 02/28/2023)

## 2023-02-19 NOTE — PROGRESS NOTE ADULT - PROBLEM SELECTOR PLAN 1
- Unwitnessed fall vs syncope; pt w/ generalized weakness/fatigue for past few weeks and poor PO intake.   - Low BUN, hypoNa and hypoK on admission 2/2 poor PO intake.   - Carotid US: moderate amount of plaque; no significant stenosis.   - CTA: no ICH or fracture.   - Pt was orthostatic 2/17/23 and received IVF.   - S/P additional fall in bathroom on 2/19/2023 overnight; no headstrike, LOC or other complaints
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
## 2023-02-20 LAB
GLUCOSE BLDC GLUCOMTR-MCNC: 148 MG/DL — HIGH (ref 70–99)
GLUCOSE BLDC GLUCOMTR-MCNC: 151 MG/DL — HIGH (ref 70–99)
GLUCOSE BLDC GLUCOMTR-MCNC: 174 MG/DL — HIGH (ref 70–99)
GLUCOSE BLDC GLUCOMTR-MCNC: 220 MG/DL — HIGH (ref 70–99)

## 2023-02-20 PROCEDURE — 99233 SBSQ HOSP IP/OBS HIGH 50: CPT

## 2023-02-20 RX ORDER — HYDRALAZINE HCL 50 MG
25 TABLET ORAL ONCE
Refills: 0 | Status: COMPLETED | OUTPATIENT
Start: 2023-02-20 | End: 2023-02-20

## 2023-02-20 RX ADMIN — ATORVASTATIN CALCIUM 40 MILLIGRAM(S): 80 TABLET, FILM COATED ORAL at 22:41

## 2023-02-20 RX ADMIN — Medication 500 MILLIGRAM(S): at 05:09

## 2023-02-20 RX ADMIN — Medication 81 MILLIGRAM(S): at 11:17

## 2023-02-20 RX ADMIN — Medication 4: at 12:14

## 2023-02-20 RX ADMIN — Medication 25 MILLIGRAM(S): at 13:57

## 2023-02-20 RX ADMIN — Medication 524 MILLIGRAM(S): at 16:10

## 2023-02-20 RX ADMIN — Medication 500 MILLIGRAM(S): at 11:17

## 2023-02-20 RX ADMIN — ENOXAPARIN SODIUM 40 MILLIGRAM(S): 100 INJECTION SUBCUTANEOUS at 18:34

## 2023-02-20 RX ADMIN — Medication 500 MILLIGRAM(S): at 00:11

## 2023-02-20 RX ADMIN — CARVEDILOL PHOSPHATE 25 MILLIGRAM(S): 80 CAPSULE, EXTENDED RELEASE ORAL at 17:30

## 2023-02-20 RX ADMIN — LISINOPRIL 20 MILLIGRAM(S): 2.5 TABLET ORAL at 05:09

## 2023-02-20 RX ADMIN — NYSTATIN CREAM 1 APPLICATION(S): 100000 CREAM TOPICAL at 06:08

## 2023-02-20 RX ADMIN — Medication 2: at 07:15

## 2023-02-20 RX ADMIN — SPIRONOLACTONE 25 MILLIGRAM(S): 25 TABLET, FILM COATED ORAL at 05:09

## 2023-02-20 RX ADMIN — NYSTATIN CREAM 1 APPLICATION(S): 100000 CREAM TOPICAL at 17:30

## 2023-02-20 RX ADMIN — CARVEDILOL PHOSPHATE 25 MILLIGRAM(S): 80 CAPSULE, EXTENDED RELEASE ORAL at 07:40

## 2023-02-20 RX ADMIN — Medication 500 MILLIGRAM(S): at 17:31

## 2023-02-20 NOTE — PROGRESS NOTE ADULT - PROBLEM SELECTOR PLAN 1
- Unwitnessed fall vs syncope; pt w/ generalized weakness/fatigue for past few weeks and poor PO intake.   - Low BUN, hypoNa and hypoK on admission 2/2 poor PO intake.   - Carotid US: moderate amount of plaque; no significant stenosis.   - CTA: no ICH or fracture.   - Pt was orthostatic 2/17/23 and received IVF.   - S/P additional fall in bathroom on 2/19/2023 overnight; no headstrike, LOC or other complaints - Unwitnessed fall vs syncope; pt w/ generalized weakness/fatigue for past few weeks and poor PO intake.   - Low BUN, hypoNa and hypoK on admission 2/2 poor PO intake.   - Carotid US 2/13/23: moderate amount of plaque; no significant stenosis.   - CT Head 2/12/23: no ICH or fracture.   - Pt was orthostatic 2/17/23 and received IVF.   - S/P additional fall in bathroom on 2/19/2023 overnight; no headstrike, LOC or other complaints
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
## 2023-02-20 NOTE — PROGRESS NOTE ADULT - SUBJECTIVE AND OBJECTIVE BOX
Interventional Cardiology PA Adult Progress Note    C.C.:     Subjective Assessment:      ROS Negative except as per Subjective and HPI  	  MEDICATIONS:  carvedilol 25 milliGRAM(s) Oral every 12 hours  lisinopril 20 milliGRAM(s) Oral daily  spironolactone 25 milliGRAM(s) Oral daily    cephalexin 500 milliGRAM(s) Oral four times a day          atorvastatin 40 milliGRAM(s) Oral at bedtime  dextrose Oral Gel 15 Gram(s) Oral once PRN  insulin lispro (ADMELOG) corrective regimen sliding scale   SubCutaneous three times a day before meals  insulin lispro (ADMELOG) corrective regimen sliding scale   SubCutaneous at bedtime    aspirin enteric coated 81 milliGRAM(s) Oral daily  enoxaparin Injectable 40 milliGRAM(s) SubCutaneous every 24 hours  influenza   Vaccine 0.5 milliLiter(s) IntraMuscular once  nystatin Powder 1 Application(s) Topical every 12 hours      	    [PHYSICAL EXAM:  TELEMETRY:  T(C): 36.2 (02-20-23 @ 08:12), Max: 36.5 (02-19-23 @ 18:03)  HR: 60 (02-20-23 @ 07:17) (53 - 64)  BP: 162/71 (02-20-23 @ 07:17) (122/58 - 183/73)  RR: 18 (02-20-23 @ 07:17) (18 - 18)  SpO2: 96% (02-20-23 @ 07:17) (95% - 96%)  Wt(kg): --  I&O's Summary    19 Feb 2023 07:01  -  20 Feb 2023 07:00  --------------------------------------------------------  IN: 600 mL / OUT: 1350 mL / NET: -750 mL    20 Feb 2023 07:01  -  20 Feb 2023 08:59  --------------------------------------------------------  IN: 0 mL / OUT: 250 mL / NET: -250 mL        Harman:  Central/PICC/Mid Line:                                         Appearance: Normal	  HEENT:   Normal oral mucosa, PERRL, EOMI	  Neck: Supple, + JVD/ - JVD; Carotid Bruit   Cardiovascular: Normal S1 S2, No JVD, No murmurs,   Respiratory: Lungs clear to auscultation/Decreased Breath Sounds/No Rales, Rhonchi, Wheezing	  Gastrointestinal:  Soft, Non-tender, + BS	  Skin: No rashes, No ecchymoses, No cyanosis  Extremities: Normal range of motion, No clubbing, cyanosis or edema  Vascular: Peripheral pulses palpable 2+ bilaterally  Neurologic: Non-focal  Psychiatry: A & O x 3, Mood & affect appropriate      	    ECG:  	  RADIOLOGY:   DIAGNOSTIC TESTING:  [ ] Echocardiogram:  [ ]  Catheterization:  [ ] Stress Test:    [ ] JAYLA  OTHER: 	    LABS:	 	  CARDIAC MARKERS:                    proBNP:   Lipid Profile:   HgA1c:   TSH:       ASSESSMENT/PLAN: 	        DVT ppx:  Dispo:     Interventional Cardiology PA Adult Progress Note    Subjective Assessment:      ROS Negative except as per Subjective and HPI  	  MEDICATIONS:  carvedilol 25 milliGRAM(s) Oral every 12 hours  lisinopril 20 milliGRAM(s) Oral daily  spironolactone 25 milliGRAM(s) Oral daily  cephalexin 500 milliGRAM(s) Oral four times a day  atorvastatin 40 milliGRAM(s) Oral at bedtime  dextrose Oral Gel 15 Gram(s) Oral once PRN  insulin lispro (ADMELOG) corrective regimen sliding scale   SubCutaneous three times a day before meals  insulin lispro (ADMELOG) corrective regimen sliding scale   SubCutaneous at bedtime  aspirin enteric coated 81 milliGRAM(s) Oral daily  enoxaparin Injectable 40 milliGRAM(s) SubCutaneous every 24 hours  influenza   Vaccine 0.5 milliLiter(s) IntraMuscular once  nystatin Powder 1 Application(s) Topical every 12 hours    PHYSICAL EXAM:  TELEMETRY:  T(C): 36.2 (02-20-23 @ 08:12), Max: 36.5 (02-19-23 @ 18:03)  HR: 60 (02-20-23 @ 07:17) (53 - 64)  BP: 162/71 (02-20-23 @ 07:17) (122/58 - 183/73)  RR: 18 (02-20-23 @ 07:17) (18 - 18)  SpO2: 96% (02-20-23 @ 07:17) (95% - 96%)  Wt(kg): --    I&O's Summary  19 Feb 2023 07:01  -  20 Feb 2023 07:00  --------------------------------------------------------  IN: 600 mL / OUT: 1350 mL / NET: -750 mL    20 Feb 2023 07:01  -  20 Feb 2023 08:59  --------------------------------------------------------  IN: 0 mL / OUT: 250 mL / NET: -250 mL                                       Appearance: Normal	  HEENT:   Normal oral mucosa, PERRL, EOMI	  Neck: Supple, + JVD/ - JVD; Carotid Bruit   Cardiovascular: Normal S1 S2, No JVD, No murmurs,   Respiratory: Lungs clear to auscultation/Decreased Breath Sounds/No Rales, Rhonchi, Wheezing	  Gastrointestinal:  Soft, Non-tender, + BS	  Skin: No rashes, No ecchymoses, No cyanosis  Extremities: Normal range of motion, No clubbing, cyanosis or edema  Vascular: Peripheral pulses palpable 2+ bilaterally  Neurologic: Non-focal  Psychiatry: A & O x 3, Mood & affect appropriate      	    	         Interventional Cardiology PA Adult Progress Note    Subjective Assessment:  Patient seen and examined at bedside. Continues to be anxious regarding [facility] placement    ROS Negative except as per Subjective and HPI  No overnight events, telemetry reviewed - episodic periods of SB (low of 49)  	  MEDICATIONS:  carvedilol 25 milliGRAM(s) Oral every 12 hours  lisinopril 20 milliGRAM(s) Oral daily  spironolactone 25 milliGRAM(s) Oral daily  cephalexin 500 milliGRAM(s) Oral four times a day  atorvastatin 40 milliGRAM(s) Oral at bedtime  dextrose Oral Gel 15 Gram(s) Oral once PRN  insulin lispro (ADMELOG) corrective regimen sliding scale   SubCutaneous three times a day before meals  insulin lispro (ADMELOG) corrective regimen sliding scale   SubCutaneous at bedtime  aspirin enteric coated 81 milliGRAM(s) Oral daily  enoxaparin Injectable 40 milliGRAM(s) SubCutaneous every 24 hours  influenza   Vaccine 0.5 milliLiter(s) IntraMuscular once  nystatin Powder 1 Application(s) Topical every 12 hours    PHYSICAL EXAM:  TELEMETRY:  T(C): 36.2 (02-20-23 @ 08:12), Max: 36.5 (02-19-23 @ 18:03)  HR: 60 (02-20-23 @ 07:17) (53 - 64)  BP: 162/71 (02-20-23 @ 07:17) (122/58 - 183/73)  RR: 18 (02-20-23 @ 07:17) (18 - 18)  SpO2: 96% (02-20-23 @ 07:17) (95% - 96%)  Wt(kg): --    I&O's Summary  19 Feb 2023 07:01  -  20 Feb 2023 07:00  --------------------------------------------------------  IN: 600 mL / OUT: 1350 mL / NET: -750 mL    20 Feb 2023 07:01  -  20 Feb 2023 08:59  --------------------------------------------------------  IN: 0 mL / OUT: 250 mL / NET: -250 mL                                       Appearance: Normal	  HEENT:   Normal oral mucosa, PERRL, EOMI	  Neck: Supple, + JVD/ - JVD; Carotid Bruit   Cardiovascular: Normal S1 S2, No JVD, No murmurs,   Respiratory: Lungs clear to auscultation/Decreased Breath Sounds/No Rales, Rhonchi, Wheezing	  Gastrointestinal:  Soft, Non-tender, + BS	  Skin: No rashes, No ecchymoses, No cyanosis  Extremities: Normal range of motion, No clubbing, cyanosis or edema  Vascular: Peripheral pulses palpable 2+ bilaterally  Neurologic: Non-focal  Psychiatry: A & O x 3, Mood & affect appropriate

## 2023-02-20 NOTE — PROVIDER CONTACT NOTE (OTHER) - ACTION/TREATMENT ORDERED:
PA stated she will place orders for both diarrhea and bp.
Team stated to continue to monitor. No new orders at this time.
Continue to monitor.

## 2023-02-20 NOTE — PROGRESS NOTE ADULT - ASSESSMENT
62F, former smoker, PMhx of medication non compliance 2/2 paranoid delusions, HTN, HLD, DM-II, Hypothyroidism, Hyponatremia (requiring MICU admission 11/2022) and Breast CA, who presented to [facility] ED for eval of fall vs syncope i/s/o poor PO intake. Pt found to have elevated troponin and abnormal CCTA, plan for medical management 2/2 poor compliance. Pt now pending [facility] auth.

## 2023-02-21 ENCOUNTER — TRANSCRIPTION ENCOUNTER (OUTPATIENT)
Age: 63
End: 2023-02-21

## 2023-02-21 LAB
GLUCOSE BLDC GLUCOMTR-MCNC: 152 MG/DL — HIGH (ref 70–99)
GLUCOSE BLDC GLUCOMTR-MCNC: 160 MG/DL — HIGH (ref 70–99)
GLUCOSE BLDC GLUCOMTR-MCNC: 173 MG/DL — HIGH (ref 70–99)
GLUCOSE BLDC GLUCOMTR-MCNC: 174 MG/DL — HIGH (ref 70–99)
RAPID RVP RESULT: SIGNIFICANT CHANGE UP
SARS-COV-2 RNA SPEC QL NAA+PROBE: SIGNIFICANT CHANGE UP

## 2023-02-21 PROCEDURE — 99232 SBSQ HOSP IP/OBS MODERATE 35: CPT

## 2023-02-21 RX ORDER — CEPHALEXIN 500 MG
1 CAPSULE ORAL
Qty: 0 | Refills: 0 | DISCHARGE
Start: 2023-02-21

## 2023-02-21 RX ORDER — CARVEDILOL PHOSPHATE 80 MG/1
1 CAPSULE, EXTENDED RELEASE ORAL
Qty: 0 | Refills: 0 | DISCHARGE
Start: 2023-02-21

## 2023-02-21 RX ORDER — GLIMEPIRIDE 1 MG
5 TABLET ORAL
Qty: 0 | Refills: 0 | DISCHARGE

## 2023-02-21 RX ORDER — GLIMEPIRIDE 1 MG
1 TABLET ORAL
Qty: 0 | Refills: 0 | DISCHARGE

## 2023-02-21 RX ORDER — ASPIRIN/CALCIUM CARB/MAGNESIUM 324 MG
1 TABLET ORAL
Qty: 0 | Refills: 0 | DISCHARGE
Start: 2023-02-21

## 2023-02-21 RX ORDER — LISINOPRIL 2.5 MG/1
1 TABLET ORAL
Qty: 0 | Refills: 0 | DISCHARGE
Start: 2023-02-21

## 2023-02-21 RX ORDER — METFORMIN HYDROCHLORIDE 850 MG/1
1 TABLET ORAL
Qty: 0 | Refills: 0 | DISCHARGE

## 2023-02-21 RX ORDER — LISINOPRIL 2.5 MG/1
30 TABLET ORAL DAILY
Refills: 0 | Status: DISCONTINUED | OUTPATIENT
Start: 2023-02-21 | End: 2023-02-23

## 2023-02-21 RX ORDER — ATORVASTATIN CALCIUM 80 MG/1
1 TABLET, FILM COATED ORAL
Qty: 0 | Refills: 0 | DISCHARGE
Start: 2023-02-21

## 2023-02-21 RX ORDER — SPIRONOLACTONE 25 MG/1
1 TABLET, FILM COATED ORAL
Qty: 0 | Refills: 0 | DISCHARGE
Start: 2023-02-21

## 2023-02-21 RX ADMIN — Medication 500 MILLIGRAM(S): at 06:11

## 2023-02-21 RX ADMIN — ENOXAPARIN SODIUM 40 MILLIGRAM(S): 100 INJECTION SUBCUTANEOUS at 18:46

## 2023-02-21 RX ADMIN — LISINOPRIL 30 MILLIGRAM(S): 2.5 TABLET ORAL at 17:01

## 2023-02-21 RX ADMIN — SPIRONOLACTONE 25 MILLIGRAM(S): 25 TABLET, FILM COATED ORAL at 06:11

## 2023-02-21 RX ADMIN — Medication 2: at 07:07

## 2023-02-21 RX ADMIN — Medication 500 MILLIGRAM(S): at 18:02

## 2023-02-21 RX ADMIN — CARVEDILOL PHOSPHATE 25 MILLIGRAM(S): 80 CAPSULE, EXTENDED RELEASE ORAL at 11:56

## 2023-02-21 RX ADMIN — Medication 500 MILLIGRAM(S): at 00:19

## 2023-02-21 RX ADMIN — Medication 81 MILLIGRAM(S): at 11:56

## 2023-02-21 RX ADMIN — CARVEDILOL PHOSPHATE 25 MILLIGRAM(S): 80 CAPSULE, EXTENDED RELEASE ORAL at 22:52

## 2023-02-21 RX ADMIN — Medication 2: at 12:13

## 2023-02-21 RX ADMIN — Medication 500 MILLIGRAM(S): at 11:56

## 2023-02-21 RX ADMIN — NYSTATIN CREAM 1 APPLICATION(S): 100000 CREAM TOPICAL at 07:08

## 2023-02-21 RX ADMIN — ATORVASTATIN CALCIUM 40 MILLIGRAM(S): 80 TABLET, FILM COATED ORAL at 22:48

## 2023-02-21 RX ADMIN — LISINOPRIL 20 MILLIGRAM(S): 2.5 TABLET ORAL at 06:09

## 2023-02-21 RX ADMIN — NYSTATIN CREAM 1 APPLICATION(S): 100000 CREAM TOPICAL at 17:03

## 2023-02-21 RX ADMIN — Medication 2: at 17:00

## 2023-02-21 NOTE — PROGRESS NOTE ADULT - SUBJECTIVE AND OBJECTIVE BOX
Interventional Cardiology PA Adult Progress Note    Subjective Assessment:  Patient seen and examined at bedside. Unfortunately patient's to [facility] was revoked. Patient reporting disappointment but understanding of situation. Denies CP,SOB, palpitations.     ROS Negative except as per Subjective and HPI  	  MEDICATIONS:  carvedilol 25 milliGRAM(s) Oral every 12 hours  lisinopril 30 milliGRAM(s) Oral daily  spironolactone 25 milliGRAM(s) Oral daily  cephalexin 500 milliGRAM(s) Oral four times a day  atorvastatin 40 milliGRAM(s) Oral at bedtime  dextrose Oral Gel 15 Gram(s) Oral once PRN  insulin lispro (ADMELOG) corrective regimen sliding scale   SubCutaneous three times a day before meals  insulin lispro (ADMELOG) corrective regimen sliding scale   SubCutaneous at bedtime  aspirin enteric coated 81 milliGRAM(s) Oral daily  enoxaparin Injectable 40 milliGRAM(s) SubCutaneous every 24 hours  influenza   Vaccine 0.5 milliLiter(s) IntraMuscular once  nystatin Powder 1 Application(s) Topical every 12 hours    PHYSICAL EXAM:  TELEMETRY:  T(C): 36.1 (02-21-23 @ 14:04), Max: 36.6 (02-20-23 @ 18:00)  HR: 64 (02-21-23 @ 10:51) (53 - 75)  BP: 150/86 (02-21-23 @ 10:51) (150/86 - 180/77)  RR: 18 (02-21-23 @ 08:34) (18 - 18)  SpO2: 98% (02-21-23 @ 08:34) (96% - 98%)  Wt(kg): --    I&O's Summary  20 Feb 2023 07:01  -  21 Feb 2023 07:00  --------------------------------------------------------  IN: 940 mL / OUT: 1450 mL / NET: -510 mL    21 Feb 2023 07:01  -  21 Feb 2023 16:39  --------------------------------------------------------  IN: 300 mL / OUT: 400 mL / NET: -100 mL                                   Appearance: Normal, NAD, very pleasant   HEENT:   Normal oral mucosa, PERRL, EOMI	  Neck: Supple, - JVD  Cardiovascular: Normal S1 S2, No JVD, No murmurs,   Respiratory: Lungs clear to auscultation/Decreased Breath Sounds/No Rales, Rhonchi, Wheezing	  Gastrointestinal:  Soft, Non-tender, + BS	  Skin: No rashes, No ecchymoses, No cyanosis  Extremities: Normal range of motion, No clubbing, cyanosis or edema  Vascular: Peripheral pulses palpable 2+ bilaterally  Neurologic: Non-focal  Psychiatry: A & O x 3, Mood & affect appropriate

## 2023-02-21 NOTE — DISCHARGE NOTE NURSING/CASE MANAGEMENT/SOCIAL WORK - NSDCPEFALRISK_GEN_ALL_CORE
For information on Fall & Injury Prevention, visit: [email or web address] OR  [email or web address] OR  https://www.cdc.gov/steadi/patient.html

## 2023-02-21 NOTE — CHART NOTE - NSCHARTNOTEFT_GEN_A_CORE
Admitting Diagnosis:   Patient is a 62y old  Female who presents with a chief complaint of Fall vs syncope (21 Feb 2023 09:22)      PAST MEDICAL & SURGICAL HISTORY:  Malignant neoplasm of female breast  Breast CA, left      Hypothyroidism  Adult hypothyroidism      Type 2 diabetes mellitus  DM type 2 (diabetes mellitus, type 2)      Essential hypertension  HTN (hypertension)      HLD (hyperlipidemia)      Former smoker      Hyponatremia      Anal fistula  Anal fistula      Other postprocedural status  History of lumpectomy of left breast      Current Nutrition Order: DASH     PO Intake: Good (%) [ X ]  Fair (50-75%) [   ] Poor (<25%) [   ]    GI Issues:   WNL. Last BM yesterday 2/20    Pain:  No pain noted at this time     Skin Integrity:  WNL, shavonne scale 16  Full edema assessment unavailable     Labs:     CAPILLARY BLOOD GLUCOSE    POCT Blood Glucose.: 160 mg/dL (21 Feb 2023 11:54)  POCT Blood Glucose.: 152 mg/dL (21 Feb 2023 06:39)  POCT Blood Glucose.: 174 mg/dL (20 Feb 2023 22:08)  POCT Blood Glucose.: 148 mg/dL (20 Feb 2023 17:37)      Medications:  MEDICATIONS (STANDING):  aspirin enteric coated 81 milliGRAM(s) Oral daily  atorvastatin 40 milliGRAM(s) Oral at bedtime  carvedilol 25 milliGRAM(s) Oral every 12 hours  cephalexin 500 milliGRAM(s) Oral four times a day  enoxaparin Injectable 40 milliGRAM(s) SubCutaneous every 24 hours  influenza   Vaccine 0.5 milliLiter(s) IntraMuscular once  insulin lispro (ADMELOG) corrective regimen sliding scale   SubCutaneous three times a day before meals  insulin lispro (ADMELOG) corrective regimen sliding scale   SubCutaneous at bedtime  lisinopril 30 milliGRAM(s) Oral daily  nystatin Powder 1 Application(s) Topical every 12 hours  spironolactone 25 milliGRAM(s) Oral daily    MEDICATIONS  (PRN):  dextrose Oral Gel 15 Gram(s) Oral once PRN Blood Glucose LESS THAN 70 milliGRAM(s)/deciliter      Weight:  Daily     Daily     Weight Change:  No new wts to review at this time. Please obtain new wt and then weekly wts to monitor trends and better assess nutrition adequacy     Estimated energy needs:   IBW (120lbs) used to estimate nutritional needs based on %IBW > 120%   Kcal (28- 32 kcal/kg) = 1526 - 1744 Kcal/day  Protein (1-1.3 g/kg) = 54.5 - 71g/day   Fluids (1ml/kcal) = 1526 - 1744 ml/day     Subjective:   61 y/o Female recent former 15-20 pack-year smoker w/ PMHx of non-compliance w/ meds 2/2 paranoid delusions breast CA , HTN, HLD, T2DM, hypothyroidism and hyponatremia, who initially presented to [facility] ED for evaluation of a fall vs syncope. Pt domiciled alone, walks w/ walker, and reports that this morning she was in her kitchen and fell to the ground knocking over her kitchen garbage can, but does not remember falling or if she tripped or got dizzy. Podiatry consulted for elongated nails. Pt has cellulitis of L 2nd digit. Pt planned for discharged today to rehab facility.     Clinical nutrition services provided per f/u policy, last seen on 2/13. During last visit, RD deferred NFPE and nutrition hx because pt out of the room for multiple times. Visited pt at bedside, awake and alert. No overt muscle/fat wasting at this time, appearance consistent with BMI of 28.5 (overwt status). Unable to obtain full diet hx bc pt reports she "is not ready to talk about it"; however, reports eating similarly to hospital foods with usual intake of 2 meals/day (B,D). Pt reports fair appetite PTA and in-house, but unable to quantify. Reports liking hospital food; current diet order remains appropriate at this time. Nutrition-related labs reviewed: noted episodes of hyperglycemia -> pt remains on insulin; continue BG management per medical team. No other labs to review at this time. Denies c/d/n/v.    Clinical nutrition services will continue to follow up pt per organizational policy. Please place new consult for any acute nutrition-related issues that may arise prior to follow up       Previous Nutrition Diagnosis:  Unintended Weight Loss r/t suspected decreased oral intake AEB 23.3kg wt loss x3 months (23%)    Active [ X ]  Resolved [   ]    If resolved, new PES:     Goals:  1. Consistently meets > 75% ENN  2. Prevent wt loss below 76.5kg   Euglycemia     Recommendations:  1. Continue with current diet order (DASH)  2. Monitor PO intake; honor pt food preferences as able   3. Monitor FSBG; continue BG management per medical team  4. Biweekly wts     Education: encouraged PO intake     Risk Level: High [ X ] Moderate [   ] Low [   ]    Kendal Delgadillo RD

## 2023-02-21 NOTE — PROGRESS NOTE ADULT - PROBLEM SELECTOR PLAN 1
- Unwitnessed fall vs syncope; pt w/ generalized weakness/fatigue for past few weeks and poor PO intake.   - Low BUN, hypoNa and hypoK on admission 2/2 poor PO intake.   - Carotid US 2/13/23: moderate amount of plaque; no significant stenosis.   - CT Head 2/12/23: no ICH or fracture.   - Pt was orthostatic 2/17/23 and received IVF.   - S/P additional fall in bathroom on 2/19/2023 overnight; no headstrike, LOC or other complaints

## 2023-02-21 NOTE — PROGRESS NOTE ADULT - NS ATTEND AMEND GEN_ALL_CORE FT
62F former smoker, hx of breast CA, HTN, HLP, DMT2, hypothyroidism, hypoosmolar hyponatremia who presented w an episode of presyncope in setting of orthostatic hypotension likely cause of fall.   TTE 2/13/23 normal biV function with basal inferior, inferoseptal WMA   CCTA 2/13/23 severe midLAD and RPL stenosis and moderate LCx disease. Case reviewed IC team and IC attending Dr Taylor-- recommended for medical management, deemed not candidate for cath  Patient euvolemic on exam, clear lungs, RRR, no edema, skin WWP, A&Ox3    Plan:  Augment Lisinopril to 30mg po daily for BP control  Abx for foot cellulitis  Psych wrote note 2/13 saying no psychiatric contraindications to dc  PT rec [facility]. Accepted to [facility] which rescinded their acceptance today  SW/CM working on alternative [facility] placement - this is 2nd [facility] which has rescinded acceptance.  Patient will be retained in hospital for social reasons pending [facility] placement, lab holiday TID vitals, dc tele, downgraded status  RAshleyM.Oscar Roger M.D.  Cardiology Attending.
62F former smoker, hx of breast CA, HTN, HLP, DMT2, hypothyroidism, hypoosmolar hyponatremia who presented w an episode of presyncope in setting of orthostatic hypotension likely cause of fall.   TTE 2/13/23 normal biV function with basal inferior, inferoseptal WMA   CCTA 2/13/23 severe midLAD and RPL stenosis and moderate LCx disease. Case reviewed IC team and IC attending Dr Taylor-- recommended for medical management, deemed not candidate for cath  Euvolemic on exam, RRR, clear lungs, no edema, skin WWP, A&Ox3  Plan:  Cont current HTN regimen  Abx for foot cellulitis  Patient has made final decision, amenable to [facility] (preference for [place]). SW informed. Will proceed with [facility] placement and auth pending. This is safest disposition option and patient is not accepted for home care so will not pursue this option - patient amenable to plan  Psych wrote note 2/13 saying no psychiatric contraindications to dc  Eden Roger M.D.  Cardiology Attending.
62F former smoker, hx of breast CA, HTN, HLP, DMT2, hypothyroidism, hypoosmolar hyponatremia who presented w an episode of presyncope in setting of orthostatic hypotension likely cause of fall.   TTE 2/13/23 normal biV function with basal inferior, inferoseptal WMA   CCTA 2/13/23 severe midLAD and RPL stenosis and moderate LCx disease. Case reviewed IC team and IC attending Dr Taylor-- recommended for medical management, deemed not candidate for cath  Plan:  Cont current HTN regimen  Abx for foot cellulitis  Psych wrote note 2/13 saying no psychiatric contraindications to dc  Patient has made final decision, amenable to [facility]. SW informed. Will proceed with [facility] placement and auth pending.   Eden Roger M.D.  Cardiology Attending.
62F former smoker, hx of breast CA, HTN, HLP, DMT2, hypothyroidism, hypoosmolar hyponatremia who presented w an episode of presyncope in setting of orthostatic hypotension likely cause of fall.   TTE 2/13/23 normal biV function with basal inferior, inferoseptal WMA   CCTA 2/13/23 severe midLAD and RPL stenosis and moderate LCx disease. Case reviewed IC team and IC attending Dr Taylor-- recommended for medical management, deemed not candidate for cath  Euvolemic on exam, RRR, clear lungs, no edema, skin WWP, A&Ox3  Plan:  Cont current HTN regimen  Abx for foot cellulitis  PT rec [facility], awaiting acceptance.  Patient now hesitant about going to [facility] and wants to discuss home care with CM  Psych wrote note 2/13 saying no psychiatric contraindications to dc  Eden Roger M.D.  Cardiology Attending

## 2023-02-21 NOTE — DISCHARGE NOTE NURSING/CASE MANAGEMENT/SOCIAL WORK - PATIENT PORTAL LINK FT
You can access the FollowMyHealth Patient Portal offered by [facility] by registering at the following website: [email or web address]. By joining [facility]’s FollowMyHealth portal, you will also be able to view your health information using other applications (apps) compatible with our system.

## 2023-02-21 NOTE — PROGRESS NOTE ADULT - ASSESSMENT
62F, former smoker, PMhx of medication non compliance 2/2 paranoid delusions, HTN, HLD, DM-II, Hypothyroidism, Hyponatremia (requiring MICU admission 11/2022) and Breast CA, who presented to [facility] ED for eval of fall vs syncope i/s/o poor PO intake. Pt found to have elevated troponin and abnormal CCTA, plan for medical management 2/2 poor compliance. Pt now pending [facility] auth.

## 2023-02-21 NOTE — PROGRESS NOTE ADULT - SUBJECTIVE AND OBJECTIVE BOX
Interventional Cardiology PA Adult Progress Note    C.C.:     Subjective Assessment:      ROS Negative except as per Subjective and HPI  	  MEDICATIONS:  carvedilol 25 milliGRAM(s) Oral every 12 hours  lisinopril 20 milliGRAM(s) Oral daily  spironolactone 25 milliGRAM(s) Oral daily    cephalexin 500 milliGRAM(s) Oral four times a day          atorvastatin 40 milliGRAM(s) Oral at bedtime  dextrose Oral Gel 15 Gram(s) Oral once PRN  insulin lispro (ADMELOG) corrective regimen sliding scale   SubCutaneous three times a day before meals  insulin lispro (ADMELOG) corrective regimen sliding scale   SubCutaneous at bedtime    aspirin enteric coated 81 milliGRAM(s) Oral daily  enoxaparin Injectable 40 milliGRAM(s) SubCutaneous every 24 hours  influenza   Vaccine 0.5 milliLiter(s) IntraMuscular once  nystatin Powder 1 Application(s) Topical every 12 hours      	    [PHYSICAL EXAM:  TELEMETRY:  T(C): 36.1 (02-21-23 @ 04:49), Max: 36.6 (02-20-23 @ 18:00)  HR: 75 (02-21-23 @ 08:34) (53 - 75)  BP: 164/82 (02-21-23 @ 08:34) (154/78 - 188/75)  RR: 18 (02-21-23 @ 08:34) (16 - 18)  SpO2: 98% (02-21-23 @ 08:34) (96% - 98%)  Wt(kg): --  I&O's Summary    20 Feb 2023 07:01  -  21 Feb 2023 07:00  --------------------------------------------------------  IN: 940 mL / OUT: 1450 mL / NET: -510 mL        Harman:  Central/PICC/Mid Line:                                         Appearance: Normal	  HEENT:   Normal oral mucosa, PERRL, EOMI	  Neck: Supple, + JVD/ - JVD; Carotid Bruit   Cardiovascular: Normal S1 S2, No JVD, No murmurs,   Respiratory: Lungs clear to auscultation/Decreased Breath Sounds/No Rales, Rhonchi, Wheezing	  Gastrointestinal:  Soft, Non-tender, + BS	  Skin: No rashes, No ecchymoses, No cyanosis  Extremities: Normal range of motion, No clubbing, cyanosis or edema  Vascular: Peripheral pulses palpable 2+ bilaterally  Neurologic: Non-focal  Psychiatry: A & O x 3, Mood & affect appropriate      	    ECG:  	  RADIOLOGY:   DIAGNOSTIC TESTING:  [ ] Echocardiogram:  [ ]  Catheterization:  [ ] Stress Test:    [ ] JAYLA  OTHER: 	    LABS:	 	  CARDIAC MARKERS:                    proBNP:   Lipid Profile:   HgA1c:   TSH:       ASSESSMENT/PLAN: 	        DVT ppx:  Dispo:     Interventional Cardiology PA Adult Progress Note  Patient seen and examined at bedside. Deemed stable for discharge. [facility]ed to [facility], to be discharged today, please see discharge note dated from (2/14).

## 2023-02-22 LAB
GLUCOSE BLDC GLUCOMTR-MCNC: 132 MG/DL — HIGH (ref 70–99)
GLUCOSE BLDC GLUCOMTR-MCNC: 150 MG/DL — HIGH (ref 70–99)
GLUCOSE BLDC GLUCOMTR-MCNC: 151 MG/DL — HIGH (ref 70–99)
GLUCOSE BLDC GLUCOMTR-MCNC: 226 MG/DL — HIGH (ref 70–99)

## 2023-02-22 PROCEDURE — 99232 SBSQ HOSP IP/OBS MODERATE 35: CPT

## 2023-02-22 RX ADMIN — Medication 500 MILLIGRAM(S): at 18:34

## 2023-02-22 RX ADMIN — Medication 500 MILLIGRAM(S): at 00:49

## 2023-02-22 RX ADMIN — ENOXAPARIN SODIUM 40 MILLIGRAM(S): 100 INJECTION SUBCUTANEOUS at 18:34

## 2023-02-22 RX ADMIN — NYSTATIN CREAM 1 APPLICATION(S): 100000 CREAM TOPICAL at 22:30

## 2023-02-22 RX ADMIN — Medication 500 MILLIGRAM(S): at 23:39

## 2023-02-22 RX ADMIN — Medication 81 MILLIGRAM(S): at 12:53

## 2023-02-22 RX ADMIN — Medication 500 MILLIGRAM(S): at 06:55

## 2023-02-22 RX ADMIN — ATORVASTATIN CALCIUM 40 MILLIGRAM(S): 80 TABLET, FILM COATED ORAL at 22:30

## 2023-02-22 RX ADMIN — SPIRONOLACTONE 25 MILLIGRAM(S): 25 TABLET, FILM COATED ORAL at 06:55

## 2023-02-22 RX ADMIN — Medication 500 MILLIGRAM(S): at 12:53

## 2023-02-22 RX ADMIN — LISINOPRIL 30 MILLIGRAM(S): 2.5 TABLET ORAL at 11:07

## 2023-02-22 RX ADMIN — NYSTATIN CREAM 1 APPLICATION(S): 100000 CREAM TOPICAL at 06:55

## 2023-02-22 RX ADMIN — CARVEDILOL PHOSPHATE 25 MILLIGRAM(S): 80 CAPSULE, EXTENDED RELEASE ORAL at 22:31

## 2023-02-22 RX ADMIN — CARVEDILOL PHOSPHATE 25 MILLIGRAM(S): 80 CAPSULE, EXTENDED RELEASE ORAL at 11:07

## 2023-02-22 NOTE — PROGRESS NOTE ADULT - SUBJECTIVE AND OBJECTIVE BOX
Incomplete    S: Pt seen and examined bedside.  Patient denies C/P, SOB, N/V, dizziness, palpitations, and diaphoresis.  Pt denies fever/chills, dysuria, abdominal pain, diarrhea, and cough  12 Point ROS otherwise negative except as per HPI/subjective.     O: Vital Signs Last 24 Hrs  T(C): 36.1 (22 Feb 2023 13:58), Max: 36.8 (22 Feb 2023 05:42)  T(F): 96.9 (22 Feb 2023 13:58), Max: 98.3 (22 Feb 2023 05:42)  HR: 70 (22 Feb 2023 11:11) (55 - 70)  BP: 192/81 (22 Feb 2023 11:11) (145/63 - 192/81)  BP(mean): --  RR: 18 (22 Feb 2023 11:11) (18 - 19)  SpO2: 97% (22 Feb 2023 11:11) (96% - 97%)    Parameters below as of 22 Feb 2023 11:11  Patient On (Oxygen Delivery Method): room air        PHYSICAL EXAM:  GEN: NAD  HEENT: No JVD  PULM:  CTA B/L  CARD:  RRR, S1 and S2   ABD: +BS, NT, soft/ND	  EXT: No Edema B/L LE  NEURO: A+Ox3, no focal deficit  PSYCH: Mood Appropriate    LABS:                02-21 @ 07:01 - 02-22 @ 07:00  --------------------------------------------------------  IN: 300 mL / OUT: 600 mL / NET: -300 mL    02-22 @ 07:01 - 02-22 @ 14:46  --------------------------------------------------------  IN: 300 mL / OUT: 0 mL / NET: 300 mL      Daily     Daily    S: Pt seen and examined bedside, NAD, CHANDRA.  Patient denies C/P, SOB, palpitations, diaphoresis or any other complaints at this time    12 Point ROS otherwise negative except as per HPI/subjective.     O: Vital Signs Last 24 Hrs  T(C): 36.1 (22 Feb 2023 13:58), Max: 36.8 (22 Feb 2023 05:42)  T(F): 96.9 (22 Feb 2023 13:58), Max: 98.3 (22 Feb 2023 05:42)  HR: 70 (22 Feb 2023 11:11) (55 - 70)  BP: 192/81 (22 Feb 2023 11:11) (145/63 - 192/81)  BP(mean): --  RR: 18 (22 Feb 2023 11:11) (18 - 19)  SpO2: 97% (22 Feb 2023 11:11) (96% - 97%)    Parameters below as of 22 Feb 2023 11:11  Patient On (Oxygen Delivery Method): room air        PHYSICAL EXAM:  GEN: NAD  HEENT: No JVD  PULM:  CTA B/L, no RRW B/L  CARD:  RRR, S1 and S2   ABD: +BS, NT, soft/ND	  EXT: No Edema B/L LE  NEURO: A+Ox3, no focal deficit  PSYCH: Mood Appropriate    LABS: LAB HOLIDAY      02-21 @ 07:01 - 02-22 @ 07:00  --------------------------------------------------------  IN: 300 mL / OUT: 600 mL / NET: -300 mL    02-22 @ 07:01 - 02-22 @ 14:46  --------------------------------------------------------  IN: 300 mL / OUT: 0 mL / NET: 300 mL

## 2023-02-22 NOTE — PROGRESS NOTE ADULT - REASON FOR ADMISSION
Fall vs syncope

## 2023-02-22 NOTE — PROGRESS NOTE ADULT - PROBLEM SELECTOR PLAN 7
Hx of paranoid delusions including "they put sedatives in my medicine from the pharmacy", "there is a man who bangs around my apartment", and "all this started when I had an device placed by my dentist in 2005, maybe it's monitoring my brain or tracking me I don't know."  -Utox negative  -Anurag consulted - no further psych workup needed at this time.   -PATIENT REMAINS COOPERATIVE AND AGREEABLE TO CARE. HAS NOT EXHIBITED AGITATION HOWEVER If pt becomes agitated can give haldol 2mg PO/IM q6h PRN     F: None  E: Replete if K<4 or Mag<2  N: DASH Diet  VTEppx: Lovenox SQ  Dispo: Cardiac tele  PT: Recommended for [facility], awaiting acceptance Hx of paranoid delusions including "they put sedatives in my medicine from the pharmacy", "there is a man who bangs around my apartment", and "all this started when I had an device placed by my dentist in 2005, maybe it's monitoring my brain or tracking me I don't know."  -Utox negative  -Anurag consulted - no further psych workup needed at this time.   -PATIENT REMAINS COOPERATIVE AND AGREEABLE TO CARE. HAS NOT EXHIBITED AGITATION HOWEVER If pt becomes agitated can give haldol 2mg PO/IM q6h PRN     F: None  E: Replete if K<4 or Mag<2  N: DASH Diet  VTEppx: Lovenox SQ  Dispo: Cardiac tele  PT: Recommended for [facility], awaiting acceptance

## 2023-02-22 NOTE — PROGRESS NOTE ADULT - PROBLEM SELECTOR PLAN 1
- Unwitnessed fall vs syncope; pt w/ generalized weakness/fatigue for past few weeks and poor PO intake.   - Low BUN, hypoNa and hypoK on admission 2/2 poor PO intake.   - Carotid US 2/13/23: moderate amount of plaque; no significant stenosis.   - CT Head 2/12/23: no ICH or fracture.   - Pt was orthostatic 2/17/23 and received IVF.   - S/P additional fall in bathroom on 2/19/2023 overnight; no headstrike, LOC or other complaints - Unwitnessed fall vs syncope; pt w/ generalized weakness/fatigue for past few weeks and poor PO intake.   - Low BUN, hypoNa and hypoK on admission 2/2 poor PO intake.   - Carotid US 2/13/23: moderate amount of plaque; no significant stenosis.   - CT Head 2/12/23: no ICH or fracture.   - Pt was orthostatic 2/17/23, s/p IVF. Noted orthostatic again 2/21 after ambulation with PT, Asx  - S/P additional fall in bathroom on 2/19/2023 overnight; no headstrike, LOC or other complaints

## 2023-02-22 NOTE — PROGRESS NOTE ADULT - ASSESSMENT
62F, former smoker, PMhx of medication non compliance 2/2 paranoid delusions, HTN, HLD, DM-II, Hypothyroidism, Hyponatremia (requiring MICU admission 11/2022) and Breast CA, who presented to [facility] ED for eval of fall vs syncope i/s/o poor PO intake. Pt found to have elevated troponin and abnormal CCTA, plan for medical management 2/2 poor compliance. Pt now pending [facility] auth. 62F, former smoker, PMhx of medication non compliance 2/2 paranoid delusions, HTN, HLD, DM-II, Hypothyroidism, Hyponatremia (requiring MICU admission 11/2022) and Breast CA, who presented to [facility] ED for eval of fall vs syncope i/s/o poor PO intake. Pt found to have elevated troponin and abnormal CCTA, plan for medical management 2/2 poor compliance. Pt accepted to [facility]: however is currently refusing.  Auth pending for [facility] facility.

## 2023-02-23 VITALS — HEART RATE: 58 BPM | SYSTOLIC BLOOD PRESSURE: 176 MMHG | DIASTOLIC BLOOD PRESSURE: 90 MMHG

## 2023-02-23 LAB
GLUCOSE BLDC GLUCOMTR-MCNC: 131 MG/DL — HIGH (ref 70–99)
GLUCOSE BLDC GLUCOMTR-MCNC: 137 MG/DL — HIGH (ref 70–99)

## 2023-02-23 PROCEDURE — 80053 COMPREHEN METABOLIC PANEL: CPT

## 2023-02-23 PROCEDURE — 93880 EXTRACRANIAL BILAT STUDY: CPT

## 2023-02-23 PROCEDURE — 84443 ASSAY THYROID STIM HORMONE: CPT

## 2023-02-23 PROCEDURE — 87635 SARS-COV-2 COVID-19 AMP PRB: CPT

## 2023-02-23 PROCEDURE — 36415 COLL VENOUS BLD VENIPUNCTURE: CPT

## 2023-02-23 PROCEDURE — 83735 ASSAY OF MAGNESIUM: CPT

## 2023-02-23 PROCEDURE — 84156 ASSAY OF PROTEIN URINE: CPT

## 2023-02-23 PROCEDURE — 83036 HEMOGLOBIN GLYCOSYLATED A1C: CPT

## 2023-02-23 PROCEDURE — 93306 TTE W/DOPPLER COMPLETE: CPT

## 2023-02-23 PROCEDURE — 97162 PT EVAL MOD COMPLEX 30 MIN: CPT

## 2023-02-23 PROCEDURE — 82962 GLUCOSE BLOOD TEST: CPT

## 2023-02-23 PROCEDURE — 80307 DRUG TEST PRSMV CHEM ANLYZR: CPT

## 2023-02-23 PROCEDURE — 85025 COMPLETE CBC W/AUTO DIFF WBC: CPT

## 2023-02-23 PROCEDURE — 71045 X-RAY EXAM CHEST 1 VIEW: CPT

## 2023-02-23 PROCEDURE — 0225U NFCT DS DNA&RNA 21 SARSCOV2: CPT

## 2023-02-23 PROCEDURE — 82570 ASSAY OF URINE CREATININE: CPT

## 2023-02-23 PROCEDURE — 80048 BASIC METABOLIC PNL TOTAL CA: CPT

## 2023-02-23 PROCEDURE — 84540 ASSAY OF URINE/UREA-N: CPT

## 2023-02-23 PROCEDURE — 85610 PROTHROMBIN TIME: CPT

## 2023-02-23 PROCEDURE — 84484 ASSAY OF TROPONIN QUANT: CPT

## 2023-02-23 PROCEDURE — 70450 CT HEAD/BRAIN W/O DYE: CPT | Mod: MA

## 2023-02-23 PROCEDURE — 73630 X-RAY EXAM OF FOOT: CPT

## 2023-02-23 PROCEDURE — 85027 COMPLETE CBC AUTOMATED: CPT

## 2023-02-23 PROCEDURE — 99285 EMERGENCY DEPT VISIT HI MDM: CPT | Mod: 25

## 2023-02-23 PROCEDURE — 96365 THER/PROPH/DIAG IV INF INIT: CPT

## 2023-02-23 PROCEDURE — 72170 X-RAY EXAM OF PELVIS: CPT

## 2023-02-23 PROCEDURE — 82550 ASSAY OF CK (CPK): CPT

## 2023-02-23 PROCEDURE — 97116 GAIT TRAINING THERAPY: CPT

## 2023-02-23 PROCEDURE — 84300 ASSAY OF URINE SODIUM: CPT

## 2023-02-23 PROCEDURE — 85730 THROMBOPLASTIN TIME PARTIAL: CPT

## 2023-02-23 PROCEDURE — 80061 LIPID PANEL: CPT

## 2023-02-23 PROCEDURE — 84133 ASSAY OF URINE POTASSIUM: CPT

## 2023-02-23 PROCEDURE — 99239 HOSP IP/OBS DSCHRG MGMT >30: CPT

## 2023-02-23 PROCEDURE — 83935 ASSAY OF URINE OSMOLALITY: CPT

## 2023-02-23 PROCEDURE — 75574 CT ANGIO HRT W/3D IMAGE: CPT

## 2023-02-23 PROCEDURE — 81001 URINALYSIS AUTO W/SCOPE: CPT

## 2023-02-23 RX ORDER — HYDRALAZINE HCL 50 MG
25 TABLET ORAL ONCE
Refills: 0 | Status: COMPLETED | OUTPATIENT
Start: 2023-02-23 | End: 2023-02-23

## 2023-02-23 RX ORDER — LOPERAMIDE HCL 2 MG
2 TABLET ORAL ONCE
Refills: 0 | Status: COMPLETED | OUTPATIENT
Start: 2023-02-23 | End: 2023-02-23

## 2023-02-23 RX ORDER — NYSTATIN CREAM 100000 [USP'U]/G
1 CREAM TOPICAL
Qty: 0 | Refills: 0 | DISCHARGE
Start: 2023-02-23

## 2023-02-23 RX ORDER — NIFEDIPINE 30 MG
30 TABLET, EXTENDED RELEASE 24 HR ORAL ONCE
Refills: 0 | Status: DISCONTINUED | OUTPATIENT
Start: 2023-02-23 | End: 2023-02-23

## 2023-02-23 RX ORDER — CEPHALEXIN 500 MG
1 CAPSULE ORAL
Qty: 20 | Refills: 0
Start: 2023-02-23 | End: 2023-02-27

## 2023-02-23 RX ADMIN — Medication 2 MILLIGRAM(S): at 16:10

## 2023-02-23 RX ADMIN — Medication 500 MILLIGRAM(S): at 06:02

## 2023-02-23 RX ADMIN — NYSTATIN CREAM 1 APPLICATION(S): 100000 CREAM TOPICAL at 06:06

## 2023-02-23 RX ADMIN — Medication 25 MILLIGRAM(S): at 15:23

## 2023-02-23 RX ADMIN — LISINOPRIL 30 MILLIGRAM(S): 2.5 TABLET ORAL at 06:02

## 2023-02-23 RX ADMIN — SPIRONOLACTONE 25 MILLIGRAM(S): 25 TABLET, FILM COATED ORAL at 06:02

## 2023-02-23 RX ADMIN — Medication 81 MILLIGRAM(S): at 12:04

## 2023-02-23 RX ADMIN — CARVEDILOL PHOSPHATE 25 MILLIGRAM(S): 80 CAPSULE, EXTENDED RELEASE ORAL at 08:45

## 2023-02-28 DIAGNOSIS — E83.42 HYPOMAGNESEMIA: ICD-10-CM

## 2023-02-28 DIAGNOSIS — L03.032 CELLULITIS OF LEFT TOE: ICD-10-CM

## 2023-02-28 DIAGNOSIS — M21.6X1 OTHER ACQUIRED DEFORMITIES OF RIGHT FOOT: ICD-10-CM

## 2023-02-28 DIAGNOSIS — Z91.14 PATIENT'S OTHER NONCOMPLIANCE WITH MEDICATION REGIMEN: ICD-10-CM

## 2023-02-28 DIAGNOSIS — E87.6 HYPOKALEMIA: ICD-10-CM

## 2023-02-28 DIAGNOSIS — I95.1 ORTHOSTATIC HYPOTENSION: ICD-10-CM

## 2023-02-28 DIAGNOSIS — E78.5 HYPERLIPIDEMIA, UNSPECIFIED: ICD-10-CM

## 2023-02-28 DIAGNOSIS — Z79.84 LONG TERM (CURRENT) USE OF ORAL HYPOGLYCEMIC DRUGS: ICD-10-CM

## 2023-02-28 DIAGNOSIS — Z90.12 ACQUIRED ABSENCE OF LEFT BREAST AND NIPPLE: ICD-10-CM

## 2023-02-28 DIAGNOSIS — R79.89 OTHER SPECIFIED ABNORMAL FINDINGS OF BLOOD CHEMISTRY: ICD-10-CM

## 2023-02-28 DIAGNOSIS — I48.0 PAROXYSMAL ATRIAL FIBRILLATION: ICD-10-CM

## 2023-02-28 DIAGNOSIS — M21.6X2 OTHER ACQUIRED DEFORMITIES OF LEFT FOOT: ICD-10-CM

## 2023-02-28 DIAGNOSIS — F41.9 ANXIETY DISORDER, UNSPECIFIED: ICD-10-CM

## 2023-02-28 DIAGNOSIS — Z91.018 ALLERGY TO OTHER FOODS: ICD-10-CM

## 2023-02-28 DIAGNOSIS — E03.9 HYPOTHYROIDISM, UNSPECIFIED: ICD-10-CM

## 2023-02-28 DIAGNOSIS — Z53.8 PROCEDURE AND TREATMENT NOT CARRIED OUT FOR OTHER REASONS: ICD-10-CM

## 2023-02-28 DIAGNOSIS — F20.0 PARANOID SCHIZOPHRENIA: ICD-10-CM

## 2023-02-28 DIAGNOSIS — E87.1 HYPO-OSMOLALITY AND HYPONATREMIA: ICD-10-CM

## 2023-02-28 DIAGNOSIS — I10 ESSENTIAL (PRIMARY) HYPERTENSION: ICD-10-CM

## 2023-02-28 DIAGNOSIS — Z85.3 PERSONAL HISTORY OF MALIGNANT NEOPLASM OF BREAST: ICD-10-CM

## 2023-02-28 DIAGNOSIS — E11.9 TYPE 2 DIABETES MELLITUS WITHOUT COMPLICATIONS: ICD-10-CM

## 2023-02-28 DIAGNOSIS — E88.09 OTHER DISORDERS OF PLASMA-PROTEIN METABOLISM, NOT ELSEWHERE CLASSIFIED: ICD-10-CM

## 2023-02-28 DIAGNOSIS — D72.829 ELEVATED WHITE BLOOD CELL COUNT, UNSPECIFIED: ICD-10-CM

## 2023-02-28 DIAGNOSIS — E66.3 OVERWEIGHT: ICD-10-CM

## 2024-03-26 NOTE — BH CONSULTATION LIAISON ASSESSMENT NOTE - NSBHCONSULTFOLLOWAFTERCARE_PSY_A_CORE FT
thyromegaly.      Trachea: No tracheal deviation.   Cardiovascular:      Rate and Rhythm: Normal rate and regular rhythm.      Heart sounds: Murmur heard.   Pulmonary:      Effort: Pulmonary effort is normal. No respiratory distress.      Breath sounds: Normal breath sounds. No wheezing.   Musculoskeletal:      Right lower leg: Edema present.      Left lower leg: Edema present.   Lymphadenopathy:      Cervical: No cervical adenopathy.   Skin:     General: Skin is warm.      Findings: No rash.   Neurological:      Mental Status: She is alert and oriented to person, place, and time.   Psychiatric:         Mood and Affect: Mood normal.         Behavior: Behavior normal.         Thought Content: Thought content normal.         Assessment:       Diagnosis Orders   1. Open wound of left lower leg, subsequent encounter        2. Chronic kidney disease, stage 3b ([facility])        3. Stage 3 chronic kidney disease, unspecified whether stage 3a or 3b CKD ([facility])        4. Lymphedema        5. Chronic venous hypertension (idiopathic) with ulcer of left lower extremity (CODE) ([facility])             Plan:    Continue with wound care  Continue diuretics  Therapy to determine if pt still needs walker     Return in about 3 months (around 6/26/2024), or medicare wellness.    No orders of the defined types were placed in this encounter.    No orders of the defined types were placed in this encounter.      Patient given educational materials - see patient instructions.  Discussed use, benefit, and side effects of prescribed medications.  All patient questions answered. Pt voiced understanding.Reviewed health maintenance.  Instructed to continue current medications, diet andexercise.  Patient agreed with treatment plan. Follow up as directed.     Electronicallysigned by Robin Ly MD on 3/26/2024 at 2:07 PM    
Please followup with one of the following within 1 week     [facility]  [email or web address]  [address] [place]  [address]  [phone]    [facility]  [email or web address]  Three locations  [address] [place] [address]  Phone: [phone] [address] [facility] [address] [place] [address]  Phone: [phone]  [address] [facility] [address] [place] [address]  Phone: [phone]    [facility]  [email or web address]  [address] [place] [address]  [phone]

## 2024-11-06 NOTE — H&P ADULT - PROBLEM SELECTOR PROBLEM 5
"Patient called back to ask me about the Zofran she is taking.  She said the \"ones that you put under your tongue aren't working\".  She said she wants to know if there are any alternatives.  I told her  I would ask Tim Hare PA-C and ask him to give her a call back. CT  " HTN (hypertension) DM (diabetes mellitus)

## 2025-03-21 NOTE — BH CONSULTATION LIAISON ASSESSMENT NOTE - NSBHCHARTREVIEWVS_PSY_A_CORE FT
Vital Signs Last 24 Hrs  T(C): 36.2 (13 Feb 2023 14:04), Max: 36.7 (12 Feb 2023 18:42)  T(F): 97.1 (13 Feb 2023 14:04), Max: 98 (12 Feb 2023 18:42)  HR: 85 (13 Feb 2023 13:50) (59 - 85)  BP: 154/94 (13 Feb 2023 13:50) (111/79 - 180/80)  BP(mean): 89 (12 Feb 2023 18:37) (89 - 89)  RR: 18 (13 Feb 2023 13:50) (16 - 18)  SpO2: 97% (13 Feb 2023 13:50) (96% - 99%)    Parameters below as of 13 Feb 2023 13:50  Patient On (Oxygen Delivery Method): room air     ---

## 2025-03-21 NOTE — H&P ADULT - NSHPLABSRESULTS_GEN_ALL_CORE
[[id number]] .  LABS:                         13.2   14.27 )-----------( 356      ( 15 Nov 2022 05:03 )             37.2     11-15    117<LL>  |  77<L>  |  6<L>  ----------------------------<  178<H>  See Note   |  16<L>  |  0.56    Ca    9.0      15 Nov 2022 01:49  Phos  2.7     11-15  Mg     1.7     -15    TPro  7.6  /  Alb  3.3  /  TBili  1.1  /  DBili  x   /  AST  See Note  /  ALT  See Note  /  AlkPhos  63  11-15    PT/INR - ( 2022 19:09 )   PT: 11.7 sec;   INR: 0.98          PTT - ( 2022 19:09 )  PTT:31.0 sec  Urinalysis Basic - ( 2022 21:34 )    Color: Yellow / Appearance: Clear / S.010 / pH: x  Gluc: x / Ketone: >=80 mg/dL  / Bili: Negative / Urobili: 0.2 E.U./dL   Blood: x / Protein: 30 mg/dL / Nitrite: NEGATIVE   Leuk Esterase: NEGATIVE / RBC: < 5 /HPF / WBC < 5 /HPF   Sq Epi: x / Non Sq Epi: 0-5 /HPF / Bacteria: None /HPF      CARDIAC MARKERS ( 2022 19:09 )  x     / 0.01 ng/mL / x     / x     / x                RADIOLOGY, EKG & ADDITIONAL TESTS: Reviewed.

## 2025-05-06 NOTE — PROGRESS NOTE ADULT - PROBLEM SELECTOR PLAN 9
Regarding: [place] 27 f: low back pain, menstrual cramps pain level 9  ----- Message from Terrie I sent at 5/6/2025 11:31 AM CDT -----      ----- Message from Terrie I sent at 5/6/2025 11:30 AM CDT -----  Patient Name: Hope S Wernicke    Specialist or PCP Name: Britt Rocha APNP    Symptoms: low back pain, menstrual cramps pain level 9 - Pt wants appt with PCP    Pregnant (females aged 13-60. If Yes, how long?) : no    Call Back # : [phone]    Which State are you currently located in?: [place]    Name of Clinic Site / Acct# : [facility] - [address]     Call arrived during: Work Hours     F: None   E: Replete as necessary K>4 Mg>2  N: Full/Dash/Clear Liquids/ Full Liquid/ NPO diet     DVT Prophylaxis: Lovenox 40mg daily   GI prophylaxis: None   CODE STATUS: FULL  DISPO: RMF/Home/[facility]/Rehab F: None   E: Replete as necessary K>4 Mg>2  N: Consistent carb diet    DVT Prophylaxis: Lovenox 40mg daily   GI prophylaxis: None   CODE STATUS: FULL  DISPO: [facility]F/Home/[facility]/Rehab

## 2025-07-08 NOTE — BH CONSULTATION LIAISON ASSESSMENT NOTE - ACCESS TO FIREARM
I acted within this role throughout the entirety of the procedure performed by the primary surgeon
Unable to assess